# Patient Record
Sex: FEMALE | Race: WHITE | NOT HISPANIC OR LATINO | Employment: UNEMPLOYED | URBAN - NONMETROPOLITAN AREA
[De-identification: names, ages, dates, MRNs, and addresses within clinical notes are randomized per-mention and may not be internally consistent; named-entity substitution may affect disease eponyms.]

---
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
## 2021-04-14 ENCOUNTER — HOSPITAL ENCOUNTER (EMERGENCY)
Facility: HOSPITAL | Age: 21
Discharge: HOME | End: 2021-04-14
Attending: EMERGENCY MEDICINE | Admitting: EMERGENCY MEDICINE

## 2021-04-14 VITALS
DIASTOLIC BLOOD PRESSURE: 88 MMHG | HEART RATE: 91 BPM | TEMPERATURE: 97.7 F | SYSTOLIC BLOOD PRESSURE: 163 MMHG | RESPIRATION RATE: 20 BRPM | BODY MASS INDEX: 33.29 KG/M2 | OXYGEN SATURATION: 97 % | WEIGHT: 195 LBS | HEIGHT: 64 IN

## 2021-04-14 DIAGNOSIS — J03.90 TONSILLITIS: Primary | ICD-10-CM

## 2021-04-14 LAB
HETEROPH AB SER QL LA: NEGATIVE
S PYO AG THROAT QL: NEGATIVE

## 2021-04-14 PROCEDURE — 25010000002 DEXAMETHASONE SODIUM PHOSPHATE 10 MG/ML SOLUTION: Performed by: STUDENT IN AN ORGANIZED HEALTH CARE EDUCATION/TRAINING PROGRAM

## 2021-04-14 PROCEDURE — 86308 HETEROPHILE ANTIBODY SCREEN: CPT | Performed by: STUDENT IN AN ORGANIZED HEALTH CARE EDUCATION/TRAINING PROGRAM

## 2021-04-14 PROCEDURE — 99283 EMERGENCY DEPT VISIT LOW MDM: CPT

## 2021-04-14 PROCEDURE — 87081 CULTURE SCREEN ONLY: CPT | Performed by: EMERGENCY MEDICINE

## 2021-04-14 PROCEDURE — 87880 STREP A ASSAY W/OPTIC: CPT | Performed by: EMERGENCY MEDICINE

## 2021-04-14 PROCEDURE — 96372 THER/PROPH/DIAG INJ SC/IM: CPT

## 2021-04-14 RX ORDER — CLINDAMYCIN PHOSPHATE 150 MG/ML
600 INJECTION, SOLUTION INTRAVENOUS ONCE
Status: COMPLETED | OUTPATIENT
Start: 2021-04-14 | End: 2021-04-14

## 2021-04-14 RX ORDER — CLINDAMYCIN HYDROCHLORIDE 300 MG/1
300 CAPSULE ORAL 3 TIMES DAILY
Qty: 30 CAPSULE | Refills: 0 | Status: SHIPPED | OUTPATIENT
Start: 2021-04-14 | End: 2021-04-16

## 2021-04-14 RX ORDER — DEXAMETHASONE SODIUM PHOSPHATE 10 MG/ML
10 INJECTION, SOLUTION INTRAMUSCULAR; INTRAVENOUS ONCE
Status: COMPLETED | OUTPATIENT
Start: 2021-04-14 | End: 2021-04-14

## 2021-04-14 RX ADMIN — CLINDAMYCIN PHOSPHATE 600 MG: 150 INJECTION, SOLUTION INTRAVENOUS at 04:51

## 2021-04-14 RX ADMIN — DEXAMETHASONE SODIUM PHOSPHATE 10 MG: 10 INJECTION, SOLUTION INTRAMUSCULAR; INTRAVENOUS at 03:43

## 2021-04-14 NOTE — ED PROVIDER NOTES
Subjective   She presents with increasing tonsil symptoms over the past couple days.  Patient was seen at [facility] 3 days ago for sore throat and subjective fever.  Patient was swabbed for Covid, flu, and strep and tested negative.  They gave her an injection of penicillin and sent her home.  Patient states her tonsils have been swelling increasingly by the day and she has been coughing up white and yellow mucus.  She has not measured her temperature but she states she feels like she has been febrile during the course of this illness.          Review of Systems   Constitutional: Positive for fever. Negative for activity change and appetite change.   HENT: Positive for sore throat and trouble swallowing. Negative for congestion, ear pain and postnasal drip.    Eyes: Negative for pain and discharge.   Respiratory: Positive for cough. Negative for chest tightness and shortness of breath.    Cardiovascular: Negative for chest pain and palpitations.   Gastrointestinal: Positive for nausea. Negative for abdominal distention and abdominal pain.   Endocrine: Negative for cold intolerance and heat intolerance.   Genitourinary: Negative for difficulty urinating and dysuria.   Musculoskeletal: Negative for arthralgias and back pain.   Skin: Negative for color change and rash.   Allergic/Immunologic: Negative for environmental allergies and food allergies.   Neurological: Negative for dizziness and headaches.   Hematological: Negative for adenopathy. Does not bruise/bleed easily.   Psychiatric/Behavioral: Negative for agitation and confusion.       Past Medical History:   Diagnosis Date   • Allergic    • Anxiety    • Asthma    • Depression        No Known Allergies    Past Surgical History:   Procedure Laterality Date   • KNEE SURGERY Bilateral 2015    both knees        Family History   Problem Relation Age of Onset   • Heart disease Mother    • Diabetes Father    • Hypertension Father        Social History      Socioeconomic History   • Marital status: Single     Spouse name: Not on file   • Number of children: Not on file   • Years of education: Not on file   • Highest education level: Not on file   Tobacco Use   • Smoking status: Current Every Day Smoker     Packs/day: 0.25     Years: 2.00     Pack years: 0.50     Types: Cigarettes   • Smokeless tobacco: Never Used   Substance and Sexual Activity   • Alcohol use: No   • Drug use: No   • Sexual activity: Not Currently           Objective   Physical Exam  Constitutional:       Appearance: She is well-developed. She is ill-appearing.   HENT:      Head: Normocephalic and atraumatic.      Right Ear: Tympanic membrane normal.      Left Ear: Tympanic membrane normal.      Nose: No congestion.      Mouth/Throat:      Mouth: Mucous membranes are moist.      Pharynx: Uvula midline. No uvula swelling.      Tonsils: Tonsillar exudate present. 3+ on the right. 3+ on the left.   Eyes:      Pupils: Pupils are equal, round, and reactive to light.   Neck:      Thyroid: No thyromegaly.      Vascular: No JVD.      Trachea: No tracheal deviation.   Cardiovascular:      Rate and Rhythm: Normal rate and regular rhythm.      Pulses:           Radial pulses are 2+ on the right side and 2+ on the left side.        Dorsalis pedis pulses are 2+ on the right side and 2+ on the left side.      Heart sounds: Normal heart sounds, S1 normal and S2 normal.   Pulmonary:      Effort: Pulmonary effort is normal.      Breath sounds: Normal breath sounds.   Abdominal:      General: Bowel sounds are normal.   Musculoskeletal:         General: Normal range of motion.   Lymphadenopathy:      Cervical: Cervical adenopathy present.   Skin:     General: Skin is warm and dry.      Capillary Refill: Capillary refill takes 2 to 3 seconds.   Neurological:      Mental Status: She is alert and oriented to person, place, and time.      GCS: GCS eye subscore is 4. GCS verbal subscore is 5. GCS motor subscore is 6.    Psychiatric:         Speech: Speech normal.         Behavior: Behavior normal.         Thought Content: Thought content normal.         Procedures           ED Course      Lab Results (last 24 hours)     Procedure Component Value Units Date/Time    Mononucleosis Screen [[id number]]  (Normal) Collected: 04/14/21 0349    Specimen: Blood Updated: 04/14/21 0400     Monospot Negative    Rapid Strep A Screen - Swab, Throat [[id number]]  (Normal) Collected: 04/14/21 0310    Specimen: Swab from Throat Updated: 04/14/21 0341     Strep A Ag Negative    Beta Strep Culture, Throat - Swab, Throat [[id number]] Collected: 04/14/21 0310    Specimen: Swab from Throat Updated: 04/14/21 0341                                             MDM  Number of Diagnoses or Management Options  Tonsillitis: new and requires workup  Diagnosis management comments: Patient with significant bilateral enlarged tonsils worsening after a penicillin injection 3 days ago.  Negative for strep and mono today.  Clindamycin and Decadron given with follow-up to PCP and ENT.  Clinda prescription given.       Amount and/or Complexity of Data Reviewed  Clinical lab tests: reviewed and ordered    Risk of Complications, Morbidity, and/or Mortality  Presenting problems: minimal  Diagnostic procedures: minimal  Management options: minimal    Patient Progress  Patient progress: stable      Final diagnoses:   Tonsillitis       ED Disposition  ED Disposition     ED Disposition Condition Comment    Discharge Stable           Kirill Turk MD  [address]   [place] [id number] [phone]    Call in 1 day  for follow up    Ulises Smith MD  [address] [place] [address] DR HERBERT [address] [place] [id number] [phone]    Call in 1 day  If symptoms worsen         Medication List      New Prescriptions    clindamycin 300 MG capsule  Commonly known as: CLEOCIN  Take 1 capsule by mouth 3 (Three) Times a Day for 10 days.           Where to Get Your Medications      These  medications were sent to [facility], [facility] - [place], [place] - [address] - [phone]  - [phone] FX  [address], [facility] [address]    Phone: [phone]   · clindamycin 300 MG capsule       This document has been electronically signed by Belen Shaw MD on April 14, 2021 04:26 CDT    Belen Shaw MD PGY-2  Part of this note may be an electronic transcription/translation of spoken language to printed text using the Dragon Dictation System.        Belen Shaw MD  Resident  04/14/21 0426

## 2021-04-14 NOTE — DISCHARGE INSTRUCTIONS
Take antibiotics as prescribed.  Follow-up with your PCP in 1 day.  Call Dr. Smith's office, phone number on your discharge instructions, if you are not improving in 2 days.  Drink plenty of fluids.  Return to ED should you have worsening of symptoms.

## 2021-04-16 ENCOUNTER — OFFICE VISIT (OUTPATIENT)
Dept: FAMILY MEDICINE CLINIC | Facility: CLINIC | Age: 21
End: 2021-04-16

## 2021-04-16 VITALS — WEIGHT: 158 LBS | HEIGHT: 64 IN | BODY MASS INDEX: 26.98 KG/M2

## 2021-04-16 DIAGNOSIS — J02.9 SORE THROAT: Primary | ICD-10-CM

## 2021-04-16 LAB — BACTERIA SPEC AEROBE CULT: NORMAL

## 2021-04-16 PROCEDURE — 99442 PR PHYS/QHP TELEPHONE EVALUATION 11-20 MIN: CPT | Performed by: STUDENT IN AN ORGANIZED HEALTH CARE EDUCATION/TRAINING PROGRAM

## 2021-04-16 RX ORDER — AMOXICILLIN AND CLAVULANATE POTASSIUM 250; 125 MG/1; MG/1
1 TABLET, FILM COATED ORAL 3 TIMES DAILY
Qty: 21 TABLET | Refills: 0 | Status: SHIPPED | OUTPATIENT
Start: 2021-04-16 | End: 2021-04-23

## 2021-04-16 NOTE — PROGRESS NOTES
Family Medicine Residency  Velia Husain MD    Subjective:   You have chosen to receive care through a telephone visit. Do you consent to use a telephone visit for your medical care today? Yes    Breann Baltazar is a 21 y.o. female who presents for sore throat.     Sore throat  Patient states that since last Thursday she has had worsening throat pain with right ear pain, went to the ER in [facility] got a PCN shot and felt better for 3 days. Got worse and went to the [facility] ER on 4/14/2021, got antibiotics and got worse. Now she has worsening throat pain, was told she had tonsillitis, and left ear pain. Feels like she has fluid in her ears, runny nose, congestion, difficulty swallowing, but denies fever/chills. She would like some relief and will see Dr. Turk on Tuesday 4/20/2021.    The following portions of the patient's history were reviewed and updated as appropriate: allergies, current medications and problem list.    Past Medical Hx:  Past Medical History:   Diagnosis Date   • Allergic    • Anxiety    • Asthma    • Depression        Past Surgical Hx:  Past Surgical History:   Procedure Laterality Date   • KNEE SURGERY Bilateral 2015    both knees        Current Meds:    Current Outpatient Medications:   •  albuterol sulfate HFA (Ventolin HFA) 108 (90 Base) MCG/ACT inhaler, Inhale 2 puffs Every 4 (Four) Hours As Needed for Wheezing., Disp: 18 g, Rfl: 5  •  clindamycin (CLEOCIN) 300 MG capsule, Take 1 capsule by mouth 3 (Three) Times a Day for 10 days., Disp: 30 capsule, Rfl: 0  •  fluticasone (FLOVENT HFA) 110 MCG/ACT inhaler, Inhale 1 puff 2 (Two) Times a Day., Disp: 12 g, Rfl: 1    Allergies:  No Known Allergies    Family Hx:  Family History   Problem Relation Age of Onset   • Heart disease Mother    • Diabetes Father    • Hypertension Father         Social History:  Social History     Socioeconomic History   • Marital status: Single     Spouse name: Not on file   • Number of children: Not on file  "  • Years of education: Not on file   • Highest education level: Not on file   Tobacco Use   • Smoking status: Current Every Day Smoker     Packs/day: 0.25     Years: 2.00     Pack years: 0.50     Types: Cigarettes   • Smokeless tobacco: Never Used   Substance and Sexual Activity   • Alcohol use: No   • Drug use: No   • Sexual activity: Not Currently       Review of Systems  Review of Systems   Constitutional: Negative for chills and fever.   HENT: Positive for congestion, ear discharge ( Feels like water in the, draining to back of throat), ear pain (Left), rhinorrhea, sore throat and trouble swallowing. Negative for sinus pressure and sinus pain.    Eyes: Negative for photophobia and visual disturbance.   Respiratory: Positive for cough ( Mucus). Negative for shortness of breath.    Cardiovascular: Negative for chest pain and palpitations.   Gastrointestinal: Negative for abdominal pain and nausea.   Genitourinary: Negative for difficulty urinating and dyspareunia.   Musculoskeletal: Negative for arthralgias and back pain.   Neurological: Positive for headaches. Negative for light-headedness.   Psychiatric/Behavioral: Negative for dysphoric mood and sleep disturbance.   All other systems reviewed and are negative.      Objective:     Ht 162.6 cm (64\")   Wt 71.7 kg (158 lb)   BMI 27.12 kg/m²   Physical Exam  Constitutional:       Comments: Physical exam could not be performed due to this being a telephone/video visit encounter.  This telephone/video visit lasted 13 minutes.             Assessment/Plan:     Diagnoses and all orders for this visit:    1. Sore throat (Primary)  -     amoxicillin-clavulanate (AUGMENTIN) 250-125 MG per tablet; Take 1 tablet by mouth 3 (Three) Times a Day for 7 days.  Dispense: 21 tablet; Refill: 0      1. Augmentin 150-125mg 3 times daily for 7 days. Likely otitis media vs tonsillitis. Follow up with Dr. Turk at next visit. Also, had Zay Perez call in a Lidocaine viscous " solution for throat pain, 10 days worth.     2.  Patient understood and acknowledged education, counseling, medication/treatment benefits and side effects. Risks, complications, and expectations of outcomes were also addressed, discussed, and acknowledged. All questions were answered and addressed.     · Rx changes: As above  · Patient Education: As above  · Compliance at present is estimated to be fair.   · Efforts to improve compliance (if necessary) will be directed at Continued physician-patient relationship.    Depression screening: Recommend PHQ 9     Follow-up:     Return in about 4 days (around 4/20/2021) for Sore throat and ear pain.    FOLLOW UP: (Sore throat)    Preventative:  Health Maintenance   Topic Date Due   • ANNUAL PHYSICAL  Never done   • Pneumococcal Vaccine 0-64 (1 of 1 - PPSV23) 01/22/2007   • COVID-19 Vaccine (1) Never done   • CHLAMYDIA SCREENING  Never done   • INFLUENZA VACCINE  08/01/2021   • TDAP/TD VACCINES (2 - Td) 02/06/2023   • HEPATITIS C SCREENING  Completed   • MENINGOCOCCAL VACCINE  Completed   • HPV VACCINES  Completed     Female Preventative: Needs to be assessed by PCP  Recommended: none  Vaccine Counseling: N/A  Interest in Covid Vaccine: Not discussed, number to [facility] Covid Vaccine Administration provided to schedule. [phone] option 1.     Weight  -Class: Overweight: 25.0-29.9kg/m2   -Patient's Body mass index is 27.12 kg/m². BMI is above normal parameters. Recommendations include: referral to primary care.   eat more fruits and vegetables, decrease soda or juice intake, increase water intake, increase physical activity, reduce portion size, cut out extra servings, reduce fast food intake, plan meals, and have 3 meals a day    Alcohol use:  reports no history of alcohol use.  Nicotine status  reports that she has been smoking cigarettes. She has a 0.50 pack-year smoking history. She has never used smokeless tobacco.    Goals        Patient Stated    •  Less pain  (pt-stated)       Barriers: none        This was a telephone encounter that lasted 13 minutes.      RISK SCORE: 1      This document has been electronically signed by Velia Husain MD on April 16, 2021 11:47 CDT

## 2021-04-20 ENCOUNTER — OFFICE VISIT (OUTPATIENT)
Dept: FAMILY MEDICINE CLINIC | Facility: CLINIC | Age: 21
End: 2021-04-20

## 2021-04-20 VITALS
WEIGHT: 208 LBS | HEART RATE: 70 BPM | BODY MASS INDEX: 35.51 KG/M2 | DIASTOLIC BLOOD PRESSURE: 70 MMHG | SYSTOLIC BLOOD PRESSURE: 120 MMHG | OXYGEN SATURATION: 99 % | HEIGHT: 64 IN

## 2021-04-20 DIAGNOSIS — H65.91 MIDDLE EAR EFFUSION, RIGHT: Primary | ICD-10-CM

## 2021-04-20 DIAGNOSIS — J02.9 SORE THROAT: ICD-10-CM

## 2021-04-20 PROCEDURE — 99213 OFFICE O/P EST LOW 20 MIN: CPT | Performed by: STUDENT IN AN ORGANIZED HEALTH CARE EDUCATION/TRAINING PROGRAM

## 2021-04-20 RX ORDER — PREDNISONE 20 MG/1
20 TABLET ORAL 2 TIMES DAILY
Qty: 10 TABLET | Refills: 0 | Status: SHIPPED | OUTPATIENT
Start: 2021-04-20 | End: 2021-04-25

## 2021-04-20 NOTE — PROGRESS NOTES
Family Medicine Residency  Kirill Turk MD    Subjective:     Breann Baltazar is a 21 y.o. female who presents for follow up of sore throat. Has been doing well on Augmentin.  Says her sore throat resolved.  Complaining of right ear pain, fullness, decreased hearing that started on Sunday.  Patient was instructed to call ENT but had not done so.    The following portions of the patient's history were reviewed and updated as appropriate: allergies, current medications, past family history, past medical history, past social history, past surgical history and problem list.    Past Medical Hx:  Past Medical History:   Diagnosis Date   • Allergic    • Anxiety    • Asthma    • Depression        Past Surgical Hx:  Past Surgical History:   Procedure Laterality Date   • KNEE SURGERY Bilateral 2015    both knees        Current Meds:    Current Outpatient Medications:   •  albuterol sulfate HFA (Ventolin HFA) 108 (90 Base) MCG/ACT inhaler, Inhale 2 puffs Every 4 (Four) Hours As Needed for Wheezing., Disp: 18 g, Rfl: 5  •  amoxicillin-clavulanate (AUGMENTIN) 250-125 MG per tablet, Take 1 tablet by mouth 3 (Three) Times a Day for 7 days., Disp: 21 tablet, Rfl: 0  •  fluticasone (FLOVENT HFA) 110 MCG/ACT inhaler, Inhale 1 puff 2 (Two) Times a Day., Disp: 12 g, Rfl: 1  •  predniSONE (DELTASONE) 20 MG tablet, Take 1 tablet by mouth 2 (Two) Times a Day for 5 days., Disp: 10 tablet, Rfl: 0    Allergies:  No Known Allergies    Family Hx:  Family History   Problem Relation Age of Onset   • Heart disease Mother    • Diabetes Father    • Hypertension Father         Social History:  Social History     Socioeconomic History   • Marital status: Single     Spouse name: Not on file   • Number of children: Not on file   • Years of education: Not on file   • Highest education level: Not on file   Tobacco Use   • Smoking status: Current Every Day Smoker     Packs/day: 0.25     Years: 2.00     Pack years: 0.50     Types: Cigarettes   •  "Smokeless tobacco: Never Used   Substance and Sexual Activity   • Alcohol use: No   • Drug use: No   • Sexual activity: Not Currently       Review of Systems  Review of Systems   Constitutional: Negative for chills and fever.   HENT: Positive for ear pain. Negative for ear discharge, facial swelling and trouble swallowing.         Decreased right sided hearing   Eyes: Negative for photophobia and visual disturbance.   Respiratory: Negative for apnea, chest tightness and shortness of breath.    Cardiovascular: Negative for chest pain.   Gastrointestinal: Negative for abdominal distention, diarrhea, nausea and vomiting.   Genitourinary: Negative for pelvic pain.   Musculoskeletal: Negative for arthralgias and myalgias.   Neurological: Negative for seizures and speech difficulty.   Psychiatric/Behavioral: Negative for confusion and hallucinations.       Objective:     /70 (BP Location: Left arm, Patient Position: Sitting, Cuff Size: Adult)   Pulse 70   Ht 162.6 cm (64\")   Wt 94.3 kg (208 lb)   SpO2 99%   BMI 35.70 kg/m²   Physical Exam  Constitutional:       Appearance: She is well-developed.   HENT:      Head: Normocephalic and atraumatic.      Right Ear: Decreased hearing noted. A middle ear effusion is present. Tympanic membrane is bulging.      Left Ear: Tympanic membrane is bulging.      Nose: Nose normal.   Eyes:      Conjunctiva/sclera: Conjunctivae normal.      Pupils: Pupils are equal, round, and reactive to light.   Cardiovascular:      Rate and Rhythm: Normal rate and regular rhythm.      Pulses: Normal pulses.      Heart sounds: Normal heart sounds.   Pulmonary:      Effort: Pulmonary effort is normal. No respiratory distress.      Breath sounds: Normal breath sounds.   Abdominal:      General: Abdomen is flat. Bowel sounds are normal. There is no distension.      Palpations: Abdomen is soft.   Musculoskeletal:         General: Normal range of motion.      Cervical back: Normal range of motion " and neck supple.   Skin:     General: Skin is warm and dry.   Neurological:      General: No focal deficit present.      Mental Status: She is alert and oriented to person, place, and time. Mental status is at baseline.      Cranial Nerves: No cranial nerve deficit.   Psychiatric:         Mood and Affect: Mood normal.         Behavior: Behavior normal.          Assessment/Plan:     Diagnoses and all orders for this visit:    1. Middle ear effusion, right (Primary)  -     predniSONE (DELTASONE) 20 MG tablet; Take 1 tablet by mouth 2 (Two) Times a Day for 5 days.  Dispense: 10 tablet; Refill: 0  -     Ambulatory Referral to ENT (Otolaryngology)    -Patient has chronically had episodes of tonsillitis with sore throat.  Middle ear effusion likely secondary to extension of infection.  We will refer patient to ENT for further evaluation.  -Patient is happy to establish herself with myself as her PCP.     Follow-up:     Return in about 4 weeks (around 5/18/2021) for Recheck.    Preventative:  Health Maintenance   Topic Date Due   • ANNUAL PHYSICAL  Never done   • Pneumococcal Vaccine 0-64 (1 of 1 - PPSV23) 01/22/2007   • COVID-19 Vaccine (1) Never done   • CHLAMYDIA SCREENING  Never done   • INFLUENZA VACCINE  08/01/2021   • TDAP/TD VACCINES (2 - Td) 02/06/2023   • HEPATITIS C SCREENING  Completed   • MENINGOCOCCAL VACCINE  Completed   • HPV VACCINES  Completed       Weight  -Class: Obese Class II: 35-39.9kg/m2  -Patient's Body mass index is 35.7 kg/m². BMI is above normal parameters.   Alcohol use:  reports no history of alcohol use.  Nicotine status  reports that she has been smoking cigarettes. She has a 0.50 pack-year smoking history. She has never used smokeless tobacco.    Goals     •  Less pain (pt-stated)       Barriers: none            RISK SCORE: 3              This document has been electronically signed by Kirill Turk MD on April 20, 2021 15:44 CDT

## 2021-04-21 NOTE — PROGRESS NOTES
I have seen the patient.  I have reviewed the notes, assessments, and/or procedures performed by *Dr Husain** during office visit I concur with her/his documentation and assessment and plan for Breann Baltazar.              This document has been electronically signed by Erasmo Sellers MD on April 21, 2021 10:03 CDT

## 2021-04-22 NOTE — PROGRESS NOTES
I have seen the patient.  I have reviewed the notes, assessments, and/or procedures performed by Kirill Turk MD, I concur with her/his documentation and assessment and plan for Breann Brittney Giovanny.               This document has been electronically signed by Gray Urrutia MD on April 22, 2021 11:14 CDT

## 2021-08-11 DIAGNOSIS — J45.40 MODERATE PERSISTENT ASTHMA WITHOUT COMPLICATION: ICD-10-CM

## 2021-08-11 RX ORDER — ALBUTEROL SULFATE 90 UG/1
2 AEROSOL, METERED RESPIRATORY (INHALATION) EVERY 4 HOURS PRN
Qty: 18 G | Refills: 5 | Status: SHIPPED | OUTPATIENT
Start: 2021-08-11 | End: 2021-12-14 | Stop reason: SDUPTHER

## 2021-08-11 NOTE — TELEPHONE ENCOUNTER
TRIED TO CALL PATIENT TO MAKE OVERDUE F/U; FIRST ATTEMPT; UNABLE TO REACH X1.        THANK YOU,      SILVIO

## 2021-08-11 NOTE — TELEPHONE ENCOUNTER
Incoming Refill Request      Medication requested (name and dose): albuterol sulfate HFA (Ventolin HFA) 108 (90 Base) MCG/ACT inhaler    [facility] [address] [phone]  - [phone] FX  Additional details provided by patient: N/A    Best call back number: [phone]  Does the patient have less than a 3 day supply:  [x] Yes  [] No    Nolvia Carmichael  08/11/21, 10:36 CDT

## 2021-09-27 ENCOUNTER — INITIAL PRENATAL (OUTPATIENT)
Dept: OBSTETRICS AND GYNECOLOGY | Facility: CLINIC | Age: 21
End: 2021-09-27

## 2021-09-27 ENCOUNTER — LAB (OUTPATIENT)
Dept: LAB | Facility: HOSPITAL | Age: 21
End: 2021-09-27

## 2021-09-27 VITALS — SYSTOLIC BLOOD PRESSURE: 101 MMHG | BODY MASS INDEX: 35.09 KG/M2 | WEIGHT: 204.4 LBS | DIASTOLIC BLOOD PRESSURE: 60 MMHG

## 2021-09-27 DIAGNOSIS — Z34.00 SUPERVISION OF NORMAL FIRST PREGNANCY, ANTEPARTUM: ICD-10-CM

## 2021-09-27 DIAGNOSIS — Z32.00 PREGNANCY EXAMINATION OR TEST, PREGNANCY UNCONFIRMED: Primary | ICD-10-CM

## 2021-09-27 DIAGNOSIS — Z32.01 POSITIVE PREGNANCY TEST: ICD-10-CM

## 2021-09-27 DIAGNOSIS — Z34.00 SUPERVISION OF NORMAL FIRST PREGNANCY, ANTEPARTUM: Primary | ICD-10-CM

## 2021-09-27 DIAGNOSIS — Z78.9 DATE OF LAST MENSTRUAL PERIOD (LMP) UNKNOWN: ICD-10-CM

## 2021-09-27 DIAGNOSIS — O36.80X0 ENCOUNTER TO DETERMINE FETAL VIABILITY OF PREGNANCY, SINGLE OR UNSPECIFIED FETUS: ICD-10-CM

## 2021-09-27 DIAGNOSIS — E66.9 OBESITY (BMI 30-39.9): ICD-10-CM

## 2021-09-27 DIAGNOSIS — F17.210 CIGARETTE SMOKER: ICD-10-CM

## 2021-09-27 LAB
ABO GROUP BLD: NORMAL
AMPHET+METHAMPHET UR QL: NEGATIVE
AMPHETAMINES UR QL: NEGATIVE
B-HCG UR QL: POSITIVE
BARBITURATES UR QL SCN: NEGATIVE
BASOPHILS # BLD AUTO: 0.04 10*3/MM3 (ref 0–0.2)
BASOPHILS NFR BLD AUTO: 0.4 % (ref 0–1.5)
BENZODIAZ UR QL SCN: NEGATIVE
BILIRUB UR QL STRIP: NEGATIVE
BLD GP AB SCN SERPL QL: NEGATIVE
BUPRENORPHINE SERPL-MCNC: NEGATIVE NG/ML
CANNABINOIDS SERPL QL: POSITIVE
CLARITY UR: ABNORMAL
COCAINE UR QL: NEGATIVE
COLOR UR: YELLOW
DEPRECATED RDW RBC AUTO: 41.6 FL (ref 37–54)
EOSINOPHIL # BLD AUTO: 0.01 10*3/MM3 (ref 0–0.4)
EOSINOPHIL NFR BLD AUTO: 0.1 % (ref 0.3–6.2)
ERYTHROCYTE [DISTWIDTH] IN BLOOD BY AUTOMATED COUNT: 13.3 % (ref 12.3–15.4)
GLUCOSE UR STRIP-MCNC: NEGATIVE MG/DL
HBV SURFACE AG SERPL QL IA: NORMAL
HCG INTACT+B SERPL-ACNC: NORMAL MIU/ML
HCT VFR BLD AUTO: 38.6 % (ref 34–46.6)
HCV AB SER DONR QL: NORMAL
HGB BLD-MCNC: 13.1 G/DL (ref 12–15.9)
HGB UR QL STRIP.AUTO: ABNORMAL
HIV1+2 AB SER QL: NORMAL
HOLD SPECIMEN: NORMAL
IMM GRANULOCYTES # BLD AUTO: 0.04 10*3/MM3 (ref 0–0.05)
IMM GRANULOCYTES NFR BLD AUTO: 0.4 % (ref 0–0.5)
INTERNAL NEGATIVE CONTROL: NEGATIVE
INTERNAL POSITIVE CONTROL: POSITIVE
KETONES UR QL STRIP: NEGATIVE
LEUKOCYTE ESTERASE UR QL STRIP.AUTO: NEGATIVE
LYMPHOCYTES # BLD AUTO: 3.39 10*3/MM3 (ref 0.7–3.1)
LYMPHOCYTES NFR BLD AUTO: 33.6 % (ref 19.6–45.3)
Lab: ABNORMAL
Lab: NORMAL
MCH RBC QN AUTO: 29.3 PG (ref 26.6–33)
MCHC RBC AUTO-ENTMCNC: 33.9 G/DL (ref 31.5–35.7)
MCV RBC AUTO: 86.4 FL (ref 79–97)
METHADONE UR QL SCN: NEGATIVE
MONOCYTES # BLD AUTO: 0.44 10*3/MM3 (ref 0.1–0.9)
MONOCYTES NFR BLD AUTO: 4.4 % (ref 5–12)
NEUTROPHILS NFR BLD AUTO: 6.17 10*3/MM3 (ref 1.7–7)
NEUTROPHILS NFR BLD AUTO: 61.1 % (ref 42.7–76)
NITRITE UR QL STRIP: NEGATIVE
NRBC BLD AUTO-RTO: 0 /100 WBC (ref 0–0.2)
OPIATES UR QL: NEGATIVE
OXYCODONE UR QL SCN: NEGATIVE
PCP UR QL SCN: NEGATIVE
PH UR STRIP.AUTO: 6 [PH] (ref 5–8)
PLATELET # BLD AUTO: 299 10*3/MM3 (ref 140–450)
PMV BLD AUTO: 11.6 FL (ref 6–12)
PROPOXYPH UR QL: NEGATIVE
PROT UR QL STRIP: NEGATIVE
RBC # BLD AUTO: 4.47 10*6/MM3 (ref 3.77–5.28)
RH BLD: POSITIVE
SP GR UR STRIP: 1.01 (ref 1–1.03)
TRICYCLICS UR QL SCN: NEGATIVE
UROBILINOGEN UR QL STRIP: ABNORMAL
WBC # BLD AUTO: 10.09 10*3/MM3 (ref 3.4–10.8)

## 2021-09-27 PROCEDURE — 86901 BLOOD TYPING SEROLOGIC RH(D): CPT | Performed by: NURSE PRACTITIONER

## 2021-09-27 PROCEDURE — G0480 DRUG TEST DEF 1-7 CLASSES: HCPCS

## 2021-09-27 PROCEDURE — 86900 BLOOD TYPING SEROLOGIC ABO: CPT | Performed by: NURSE PRACTITIONER

## 2021-09-27 PROCEDURE — 80081 OBSTETRIC PANEL INC HIV TSTG: CPT | Performed by: NURSE PRACTITIONER

## 2021-09-27 PROCEDURE — 81025 URINE PREGNANCY TEST: CPT | Performed by: NURSE PRACTITIONER

## 2021-09-27 PROCEDURE — 36415 COLL VENOUS BLD VENIPUNCTURE: CPT

## 2021-09-27 PROCEDURE — 87661 TRICHOMONAS VAGINALIS AMPLIF: CPT | Performed by: NURSE PRACTITIONER

## 2021-09-27 PROCEDURE — 80306 DRUG TEST PRSMV INSTRMNT: CPT | Performed by: NURSE PRACTITIONER

## 2021-09-27 PROCEDURE — 86803 HEPATITIS C AB TEST: CPT | Performed by: NURSE PRACTITIONER

## 2021-09-27 PROCEDURE — 84702 CHORIONIC GONADOTROPIN TEST: CPT | Performed by: NURSE PRACTITIONER

## 2021-09-27 PROCEDURE — 86850 RBC ANTIBODY SCREEN: CPT | Performed by: NURSE PRACTITIONER

## 2021-09-27 PROCEDURE — 87086 URINE CULTURE/COLONY COUNT: CPT | Performed by: NURSE PRACTITIONER

## 2021-09-27 PROCEDURE — 81003 URINALYSIS AUTO W/O SCOPE: CPT | Performed by: NURSE PRACTITIONER

## 2021-09-27 PROCEDURE — 87491 CHLMYD TRACH DNA AMP PROBE: CPT | Performed by: NURSE PRACTITIONER

## 2021-09-27 PROCEDURE — 87591 N.GONORRHOEAE DNA AMP PROB: CPT | Performed by: NURSE PRACTITIONER

## 2021-09-27 RX ORDER — PRENATAL VIT NO.126/IRON/FOLIC 28MG-0.8MG
TABLET ORAL DAILY
COMMUNITY

## 2021-09-27 RX ORDER — ONDANSETRON 8 MG/1
8 TABLET, ORALLY DISINTEGRATING ORAL EVERY 8 HOURS PRN
Qty: 20 TABLET | Refills: 1 | Status: SHIPPED | OUTPATIENT
Start: 2021-09-27 | End: 2021-10-14

## 2021-09-27 NOTE — PROGRESS NOTES
I spent approximately 60 minutes with the patient acquiring the health and history intake and discussing topics related to healthy lifestyle. She is accompanied by her boyfriend.  Her LMP is unknown; she believes it was sometime in August. This is her 1st pregnancy.   The patient has Multiple Hereditary Exostoses. Her mother and sister also has this genetic bone disorder. The patient has an inhaler for asthma. She also has anxiety and depression. She is not on medicine. She says she is coping well right now. She filled out the depression screening questionnaire and scored 5. The patient says she now smokes 1/2 pack cigarettes daily. She denies any drug use. She drank alcohol prior to getting pregnant.    The patient's nephew has autism. The FOB's mother has a bleeding disorder.   A newob bag is given. The 1st trimester teaching was done with the patient. We discussed a healthy diet and exercise and what is recommended. She is taking a prenatal vitamin. We  also discussed Listeriosis and Toxoplasmosis and what fish to avoid due to high mercury levels. I informed patient not to be in hot tubs, saunas, or tanning beds. We discussed that spotting may occur after intercourse which is common, but if heavy bleeding like a period occurs to [facility] if clinic is closed.  I encouraged her to make an appointment with the dentist if she has not had a dental exam and cleaning in the last 6 months. She is considering breastfeeding.  I gave her pamphlet on breastfeeding classes and the breastfeeding mothers support group. These services are provided by Verna Craft, Lactation Consultant. She filled out the health department referral form. I encouraged the patient to get the TDAP vaccine in the 3rd trimester.  I discussed with the patient that a pediatrician needs to be chosen prior to delivery for the infant to have an appointment scheduled before leaving the hospital.  I discussed lab tests will be done  today. An early 1hr glucola will be done during the 1st trimester for BMI 35. All questions were answered at this time. She is scheduled for an ultrasound and to see Magaly MARTINEZ on 10/14/21.

## 2021-09-28 LAB
BACTERIA SPEC AEROBE CULT: NORMAL
C TRACH RRNA CVX QL NAA+PROBE: NEGATIVE
N GONORRHOEA RRNA SPEC QL NAA+PROBE: NEGATIVE
RPR SER QL: NORMAL
RUBV IGG SERPL IA-ACNC: 1.92 INDEX
TRICHOMONAS VAGINALIS PCR: NEGATIVE

## 2021-10-08 LAB — REF LAB TEST RESULTS: NORMAL

## 2021-10-14 ENCOUNTER — INITIAL PRENATAL (OUTPATIENT)
Dept: OBSTETRICS AND GYNECOLOGY | Facility: CLINIC | Age: 21
End: 2021-10-14

## 2021-10-14 VITALS — DIASTOLIC BLOOD PRESSURE: 60 MMHG | WEIGHT: 204 LBS | BODY MASS INDEX: 35.02 KG/M2 | SYSTOLIC BLOOD PRESSURE: 112 MMHG

## 2021-10-14 DIAGNOSIS — O99.341 DEPRESSION DURING PREGNANCY IN FIRST TRIMESTER: ICD-10-CM

## 2021-10-14 DIAGNOSIS — O09.91 HIGH-RISK PREGNANCY IN FIRST TRIMESTER: ICD-10-CM

## 2021-10-14 DIAGNOSIS — F41.9 ANXIETY DURING PREGNANCY IN FIRST TRIMESTER, ANTEPARTUM: Primary | ICD-10-CM

## 2021-10-14 DIAGNOSIS — F32.A DEPRESSION DURING PREGNANCY IN FIRST TRIMESTER: ICD-10-CM

## 2021-10-14 DIAGNOSIS — O99.211 OBESITY AFFECTING PREGNANCY IN FIRST TRIMESTER: ICD-10-CM

## 2021-10-14 DIAGNOSIS — O99.321 DRUG USE AFFECTING PREGNANCY IN FIRST TRIMESTER: ICD-10-CM

## 2021-10-14 DIAGNOSIS — F12.10 MARIJUANA ABUSE: ICD-10-CM

## 2021-10-14 DIAGNOSIS — Z34.01 PRIMIGRAVIDA IN FIRST TRIMESTER: ICD-10-CM

## 2021-10-14 DIAGNOSIS — O99.331 MATERNAL TOBACCO USE IN FIRST TRIMESTER: ICD-10-CM

## 2021-10-14 DIAGNOSIS — Z3A.08 8 WEEKS GESTATION OF PREGNANCY: ICD-10-CM

## 2021-10-14 DIAGNOSIS — O99.341 ANXIETY DURING PREGNANCY IN FIRST TRIMESTER, ANTEPARTUM: Primary | ICD-10-CM

## 2021-10-14 PROBLEM — J45.20 MILD INTERMITTENT ASTHMA: Status: ACTIVE | Noted: 2021-10-14

## 2021-10-14 PROBLEM — M89.9 DISORDER OF BONE: Status: ACTIVE | Noted: 2021-10-14

## 2021-10-14 PROCEDURE — 99203 OFFICE O/P NEW LOW 30 MIN: CPT | Performed by: NURSE PRACTITIONER

## 2021-10-14 RX ORDER — METOCLOPRAMIDE 10 MG/1
10 TABLET ORAL 4 TIMES DAILY
Qty: 120 TABLET | Refills: 3 | Status: SHIPPED | OUTPATIENT
Start: 2021-10-14

## 2021-10-14 NOTE — PROGRESS NOTES
[facility]  Obstetrics Visit    CHIEF COMPLAINT:  New prenatal visit    HISTORY OF PRESENT ILLNESS:  Breann Baltazar is a 21 y.o. y/o  at 8w4d by LMP (No LMP recorded (lmp unknown). Patient is pregnant.).  This was an unplanned pregnancy and the patient is supported by her boyfriend.  Reports  nausea with vomiting.  Reports breast tenderness.  She denies any vaginal bleeding.  She has started taking a prenatal vitamin.    REVIEW OF SYSTEMS  Review of Systems   Constitutional: Negative for activity change, appetite change, diaphoresis, fatigue, unexpected weight gain and unexpected weight loss.   Respiratory: Negative for chest tightness and shortness of breath.    Cardiovascular: Negative for chest pain and palpitations.   Gastrointestinal: Positive for nausea and vomiting. Negative for abdominal distention, abdominal pain, constipation and diarrhea.   Endocrine: Negative.    Genitourinary: Positive for frequency. Negative for amenorrhea, breast discharge, breast lump, breast pain, decreased libido, decreased urine volume, difficulty urinating, dyspareunia, dysuria, flank pain, hematuria, menstrual problem, pelvic pain, pelvic pressure, urgency, vaginal bleeding, vaginal discharge and vaginal pain.   Musculoskeletal: Negative for myalgias.   Skin: Negative for color change, dry skin and skin lesions.   Neurological: Negative for light-headedness and headache.   Psychiatric/Behavioral: Negative for agitation, dysphoric mood, sleep disturbance, depressed mood and stress. The patient is not nervous/anxious.         EDPS- 5       PRENATAL RISK FACTORS   Problems (from 22 to present)     Problem Noted Resolved    High-risk pregnancy in first trimester 10/14/2021 by Magaly Ferrer, APRN No    Maternal tobacco use in first trimester 10/14/2021 by Magaly Ferrer, APRN No    Overview Signed 10/14/2021 10:59 AM by Magaly Ferrer APRN     1/2 ppd down to 5 cig/day at time of NOB visit          Marijuana abuse 10/14/2021 by Magaly Ferrer APRN No    Drug use affecting pregnancy in first trimester 10/14/2021 by Magaly Ferrer APRN No    Primigravida in first trimester 10/14/2021 by Magaly Ferrer APRN No    Obesity affecting pregnancy in first trimester 10/14/2021 by Magaly Ferrer APRN No    Overview Signed 10/14/2021 10:59 AM by Magaly Ferrer APRN     Needs 1T glucola         Depression during pregnancy in first trimester 10/14/2021 by Magaly Ferrer APRN No    Overview Signed 10/14/2021 10:59 AM by Magaly Ferrer APRN     10/14: EDPS- 5         Anxiety during pregnancy in first trimester, antepartum 10/14/2021 by Magaly Ferrer APRN No    Overview Signed 10/14/2021 10:59 AM by Magaly Ferrer APRN     10/14: EDPS- 5               DATING CRITERIA:  LMP (unk) -- CARRILLO ?  1TUS (10/14/21 at 8w4d) -- CARRILLO 23    OBSTETRIC HISTORY:  OB History    Para Term  AB Living   1             SAB IAB Ectopic Molar Multiple Live Births                    # Outcome Date GA Lbr Fredis/2nd Weight Sex Delivery Anes PTL Lv   1 Current              GYN HISTORY:  Denies h/o sexually transmitted infections/pelvic inflammatory disease  Denies h/o abnormal pap smears  Last pap smear:   Last Completed Pap Smear     This patient has no relevant Health Maintenance data.        Denies h/o gynecologic surgeries, including biopsies of the cervix    PAST MEDICAL HISTORY:  Past Medical History:   Diagnosis Date   • Allergic    • Anxiety    • Asthma    • Depression    • Multiple hereditary exostoses    • Smoker      PAST SURGICAL HISTORY:  Past Surgical History:   Procedure Laterality Date   • FEMUR OSTEOCHONDROMA Right    • KNEE SURGERY Bilateral 2015    both knees    • WISDOM TOOTH EXTRACTION       FAMILY HISTORY:  Family History   Problem Relation Age of Onset   • COPD Mother    • Asthma Mother    • Diabetes Father    • Hypertension Father    • Cirrhosis Father    • Heart attack Paternal Grandfather      SOCIAL  HISTORY:  Social History     Socioeconomic History   • Marital status: Single   Tobacco Use   • Smoking status: Current Every Day Smoker     Packs/day: 0.50     Years: 2.00     Pack years: 1.00     Types: Cigarettes   • Smokeless tobacco: Never Used   Substance and Sexual Activity   • Alcohol use: Not Currently   • Drug use: No   • Sexual activity: Yes     Partners: Male     GENETIC SCREENING:  Age >34 yo as of CARRILLO: no  Thalassemia: no  NTD: no  CHD: no  Down Syndrome/MR/Fragile X/Autism: no  Ashkenazi Restorationist with Selvin-Sachs, [place], familial dysautonomia: no  Sickle cell disease or trait: no  Hemophilia: no  Muscular dystrophy: no  Cystic fibrosis: no  Ary's chorea: no  Birth defects: no  Genetic/chromosomal disorders: bone disorder    INFECTION HISTORY:  TB exposure: no  HSV: no  Illness since LMP: no  Prior GBS infected child: no  STIs: no    ALLERGIES:  No Known Allergies    MEDICATIONS:  Prior to Admission medications    Medication Sig Start Date End Date Taking? Authorizing Provider   albuterol sulfate HFA (Ventolin HFA) 108 (90 Base) MCG/ACT inhaler Inhale 2 puffs Every 4 (Four) Hours As Needed for Wheezing. 8/11/21   Gray Urrutia MD   metoclopramide (Reglan) 10 MG tablet Take 1 tablet by mouth 4 (Four) Times a Day. 10/14/21   Magaly Ferrer APRN   prenatal vitamin (prenatal, CLASSIC, vitamin) tablet Take  by mouth Daily.    Provider, MD Vu   ondansetron ODT (Zofran ODT) 8 MG disintegrating tablet Place 1 tablet on the tongue Every 8 (Eight) Hours As Needed for Nausea or Vomiting. 9/27/21 10/14/21  Regina Moreira APRN       PHYSICAL EXAM:   /60   Wt 92.5 kg (204 lb)   LMP  (LMP Unknown)   BMI 35.02 kg/m²   General: Alert, healthy, no distress, well nourished and well developed.  Neurologic: Alert, oriented to person, place, and time.  Gait normal.  Cranial nerves II-XII grossly intact.  HEENT: Normocephalic, atraumatic.  Extraocular muscles intact, pupils equal  and reactive x2.    Teeth: Normal hygiene.  Neck: Supple, no adenopathy, thyroid normal size, non-tender, without nodularity, trachea midline.  Breasts: No masses, skin dimpling, skin retraction, nipple discharge, or asymmetry bilaterally.  Lungs: Normal respiratory effort.  Clear to auscultation bilaterally.  No wheezes, rhonci, or rales.  Heart: Regular rate and rhythm.  No murmer, rub or gallop.  Abdomen: Soft, non-tender, non-distended,no masses, no hepatosplenomegaly, no hernia.  Skin: No rash, no lesions.  Extremities: No cyanosis, clubbing or edema.  PELVIC EXAM:  Deferred    IMPRESSION:  Breann Baltazar is a 21 y.o.  at 8w4d for a new prenatal visit.    PLAN:  1.  IUP at 8w4d  - Options counseling performed and patient desires continuation of pregnancy to term   - Prenatal labs ordered  - Genetic testing, including cystic fibrosis, was discussed and patient considering  - Continue prenatal vitamins  - Weight gain counseling performed.   - Pregravid BMI >30: Recommend 11-20 lb  - Return to clinic in 4 weeks for return prenatal visit  - Reviewed COVID-19 visitation policy  - Reviewed COVID-19 precautions     Diagnosis Plan   1. Anxiety during pregnancy in first trimester, antepartum  No meds at this time.    2. Depression during pregnancy in first trimester     3. Obesity affecting pregnancy in first trimester  Glucola next visit   4. Primigravida in first trimester     5. Drug use affecting pregnancy in first trimester     6. Marijuana abuse  Encouraged complete cessation at this time   7. Maternal tobacco use in first trimester  For THC and cigarettes   8. High-risk pregnancy in first trimester     9. 8 weeks gestation of pregnancy       Magaly Ferrer, JUAN  10/14/2021  11:01 CDT

## 2021-10-18 ENCOUNTER — TELEPHONE (OUTPATIENT)
Dept: OBSTETRICS AND GYNECOLOGY | Facility: CLINIC | Age: 21
End: 2021-10-18

## 2021-10-18 DIAGNOSIS — R30.0 DYSURIA: ICD-10-CM

## 2021-10-18 DIAGNOSIS — N89.8 VAGINAL DISCHARGE: Primary | ICD-10-CM

## 2021-10-18 RX ORDER — ONDANSETRON 8 MG/1
8 TABLET, ORALLY DISINTEGRATING ORAL EVERY 8 HOURS PRN
Qty: 30 TABLET | Refills: 2 | Status: SHIPPED | OUTPATIENT
Start: 2021-10-18 | End: 2021-12-14 | Stop reason: SDUPTHER

## 2021-10-18 NOTE — TELEPHONE ENCOUNTER
Patient called wanting labs done for possible yeast infection/kidney infection and also wanting zofran. [place] [facility].  Thanks

## 2021-10-19 ENCOUNTER — LAB (OUTPATIENT)
Dept: LAB | Facility: HOSPITAL | Age: 21
End: 2021-10-19

## 2021-10-19 DIAGNOSIS — N89.8 VAGINAL DISCHARGE: ICD-10-CM

## 2021-10-19 DIAGNOSIS — R30.0 DYSURIA: ICD-10-CM

## 2021-10-19 LAB
CANDIDA ALBICANS: NEGATIVE
GARDNERELLA VAGINALIS: POSITIVE
T VAGINALIS DNA VAG QL PROBE+SIG AMP: NEGATIVE

## 2021-10-19 PROCEDURE — 87660 TRICHOMONAS VAGIN DIR PROBE: CPT

## 2021-10-19 PROCEDURE — 87480 CANDIDA DNA DIR PROBE: CPT

## 2021-10-19 PROCEDURE — 81001 URINALYSIS AUTO W/SCOPE: CPT

## 2021-10-19 PROCEDURE — 87510 GARDNER VAG DNA DIR PROBE: CPT

## 2021-10-19 PROCEDURE — 87086 URINE CULTURE/COLONY COUNT: CPT

## 2021-10-20 LAB
BACTERIA SPEC AEROBE CULT: NORMAL
BACTERIA UR QL AUTO: NORMAL /HPF
BILIRUB UR QL STRIP: NEGATIVE
CLARITY UR: CLEAR
COLOR UR: ABNORMAL
GLUCOSE UR STRIP-MCNC: NEGATIVE MG/DL
HGB UR QL STRIP.AUTO: NEGATIVE
HYALINE CASTS UR QL AUTO: NORMAL /LPF
KETONES UR QL STRIP: ABNORMAL
LEUKOCYTE ESTERASE UR QL STRIP.AUTO: NEGATIVE
NITRITE UR QL STRIP: NEGATIVE
PH UR STRIP.AUTO: 6 [PH] (ref 5–8)
PROT UR QL STRIP: ABNORMAL
RBC # UR: NORMAL /HPF
REF LAB TEST METHOD: NORMAL
SP GR UR STRIP: 1.02 (ref 1–1.03)
SQUAMOUS #/AREA URNS HPF: NORMAL /HPF
UROBILINOGEN UR QL STRIP: ABNORMAL
WBC UR QL AUTO: NORMAL /HPF

## 2021-10-21 ENCOUNTER — TELEPHONE (OUTPATIENT)
Dept: OBSTETRICS AND GYNECOLOGY | Facility: CLINIC | Age: 21
End: 2021-10-21

## 2021-10-21 RX ORDER — METRONIDAZOLE 500 MG/1
500 TABLET ORAL 2 TIMES DAILY
Qty: 14 TABLET | Refills: 0 | Status: SHIPPED | OUTPATIENT
Start: 2021-10-21 | End: 2021-10-28

## 2021-10-21 NOTE — TELEPHONE ENCOUNTER
----- Message from JUAN Serrano sent at 10/20/2021  9:26 AM CDT -----  + BV if she's having spotting or cramping please send flagyl. Otherwise, encourage her to try to wait until closer to 14 weeks for treatment.

## 2021-11-11 ENCOUNTER — ROUTINE PRENATAL (OUTPATIENT)
Dept: OBSTETRICS AND GYNECOLOGY | Facility: CLINIC | Age: 21
End: 2021-11-11

## 2021-11-11 ENCOUNTER — LAB (OUTPATIENT)
Dept: LAB | Facility: HOSPITAL | Age: 21
End: 2021-11-11

## 2021-11-11 VITALS — WEIGHT: 202 LBS | SYSTOLIC BLOOD PRESSURE: 112 MMHG | DIASTOLIC BLOOD PRESSURE: 64 MMHG | BODY MASS INDEX: 34.67 KG/M2

## 2021-11-11 DIAGNOSIS — F32.A DEPRESSION DURING PREGNANCY IN FIRST TRIMESTER: ICD-10-CM

## 2021-11-11 DIAGNOSIS — O99.341 DEPRESSION DURING PREGNANCY IN FIRST TRIMESTER: ICD-10-CM

## 2021-11-11 DIAGNOSIS — Z3A.12 12 WEEKS GESTATION OF PREGNANCY: Primary | ICD-10-CM

## 2021-11-11 DIAGNOSIS — F12.10 MARIJUANA ABUSE: ICD-10-CM

## 2021-11-11 DIAGNOSIS — O99.331 MATERNAL TOBACCO USE IN FIRST TRIMESTER: ICD-10-CM

## 2021-11-11 DIAGNOSIS — O99.211 OBESITY AFFECTING PREGNANCY IN FIRST TRIMESTER: ICD-10-CM

## 2021-11-11 DIAGNOSIS — O99.341 ANXIETY DURING PREGNANCY IN FIRST TRIMESTER, ANTEPARTUM: ICD-10-CM

## 2021-11-11 DIAGNOSIS — O99.321 DRUG USE AFFECTING PREGNANCY IN FIRST TRIMESTER: ICD-10-CM

## 2021-11-11 DIAGNOSIS — O09.91 HIGH-RISK PREGNANCY IN FIRST TRIMESTER: ICD-10-CM

## 2021-11-11 DIAGNOSIS — Z34.00 SUPERVISION OF NORMAL FIRST PREGNANCY, ANTEPARTUM: ICD-10-CM

## 2021-11-11 DIAGNOSIS — E66.9 OBESITY (BMI 30-39.9): ICD-10-CM

## 2021-11-11 DIAGNOSIS — F41.9 ANXIETY DURING PREGNANCY IN FIRST TRIMESTER, ANTEPARTUM: ICD-10-CM

## 2021-11-11 LAB — GLUCOSE 1H P 100 G GLC PO SERPL-MCNC: 141 MG/DL (ref 65–139)

## 2021-11-11 PROCEDURE — 36415 COLL VENOUS BLD VENIPUNCTURE: CPT

## 2021-11-11 PROCEDURE — 99212 OFFICE O/P EST SF 10 MIN: CPT | Performed by: NURSE PRACTITIONER

## 2021-11-11 PROCEDURE — 82950 GLUCOSE TEST: CPT

## 2021-11-12 PROBLEM — Z34.01 PRIMIGRAVIDA IN FIRST TRIMESTER: Status: RESOLVED | Noted: 2021-10-14 | Resolved: 2021-11-12

## 2021-11-12 NOTE — PROGRESS NOTES
CC: Prenatal visit    Breann Baltazar is a 21 y.o.  at 12w4d.  Doing well.  No complaints.  Denies contractions, LOF, or VB.      /64   Wt 91.6 kg (202 lb)   LMP  (LMP Unknown)   BMI 34.67 kg/m²        Fetal Heart Rate: 150     Problems (from 21 to present)     Problem Noted Resolved    High-risk pregnancy in first trimester 10/14/2021 by Magaly Ferrer APRN No    Maternal tobacco use in first trimester 10/14/2021 by Magaly Ferrer APRN No    Overview Signed 10/14/2021 10:59 AM by Magaly Ferrer APRN     1/2 ppd down to 5 cig/day at time of NOB visit         Marijuana abuse 10/14/2021 by Magaly Ferrer APRN No    Drug use affecting pregnancy in first trimester 10/14/2021 by Magaly Ferrer APRN No    Obesity affecting pregnancy in first trimester 10/14/2021 by Magaly Ferrer APRN No    Overview Signed 10/14/2021 10:59 AM by Magaly Ferrer APRN     Needs 1T glucola         Depression during pregnancy in first trimester 10/14/2021 by Magaly Ferrer APRN No    Overview Signed 10/14/2021 10:59 AM by Magaly Ferrer APRN     10/14: EDPS- 5         Anxiety during pregnancy in first trimester, antepartum 10/14/2021 by Magaly Ferrer APRN No    Overview Signed 10/14/2021 10:59 AM by Magaly Ferrer APRN     10/14: EDPS- 5         Primigravida in first trimester 10/14/2021 by Magaly Ferrer APRN 2021 by Regina Moreira APRN          A/P: Breann Baltazar is a 21 y.o.  at 12w5d.  - RTC in 4 weeks     Diagnosis Plan   1. 12 weeks gestation of pregnancy     2. High-risk pregnancy in first trimester     3. Maternal tobacco use in first trimester     4. Marijuana abuse     5. Anxiety during pregnancy in first trimester, antepartum     6. Depression during pregnancy in first trimester     7. Obesity affecting pregnancy in first trimester     8. Drug use affecting pregnancy in first trimester         JUAN Dougherty  2021  12:22 CST

## 2021-11-15 DIAGNOSIS — R73.09 ABNORMAL GLUCOSE: Primary | ICD-10-CM

## 2021-12-10 ENCOUNTER — ROUTINE PRENATAL (OUTPATIENT)
Dept: OBSTETRICS AND GYNECOLOGY | Facility: CLINIC | Age: 21
End: 2021-12-10

## 2021-12-10 VITALS — WEIGHT: 198 LBS | BODY MASS INDEX: 33.99 KG/M2 | DIASTOLIC BLOOD PRESSURE: 60 MMHG | SYSTOLIC BLOOD PRESSURE: 114 MMHG

## 2021-12-10 DIAGNOSIS — O99.342 DEPRESSION DURING PREGNANCY IN SECOND TRIMESTER: ICD-10-CM

## 2021-12-10 DIAGNOSIS — Z36.3 ENCOUNTER FOR ROUTINE SCREENING FOR FETAL MALFORMATION USING ULTRASOUND: ICD-10-CM

## 2021-12-10 DIAGNOSIS — O99.212 OBESITY AFFECTING PREGNANCY IN SECOND TRIMESTER: ICD-10-CM

## 2021-12-10 DIAGNOSIS — F32.A DEPRESSION DURING PREGNANCY IN SECOND TRIMESTER: ICD-10-CM

## 2021-12-10 DIAGNOSIS — O99.342 ANXIETY DURING PREGNANCY IN SECOND TRIMESTER, ANTEPARTUM: ICD-10-CM

## 2021-12-10 DIAGNOSIS — Z3A.16 16 WEEKS GESTATION OF PREGNANCY: Primary | ICD-10-CM

## 2021-12-10 DIAGNOSIS — F12.10 MARIJUANA ABUSE: ICD-10-CM

## 2021-12-10 DIAGNOSIS — O99.332 MATERNAL TOBACCO USE IN SECOND TRIMESTER: ICD-10-CM

## 2021-12-10 DIAGNOSIS — F41.9 ANXIETY DURING PREGNANCY IN SECOND TRIMESTER, ANTEPARTUM: ICD-10-CM

## 2021-12-10 DIAGNOSIS — O99.322 DRUG USE AFFECTING PREGNANCY IN SECOND TRIMESTER: ICD-10-CM

## 2021-12-10 DIAGNOSIS — O09.92 HIGH-RISK PREGNANCY IN SECOND TRIMESTER: ICD-10-CM

## 2021-12-10 PROCEDURE — 99212 OFFICE O/P EST SF 10 MIN: CPT | Performed by: NURSE PRACTITIONER

## 2021-12-10 NOTE — PROGRESS NOTES
CC: Prenatal visit    Breann Baltazar is a 21 y.o.  at 16w5d.  Doing well.  No complaints.  Denies contractions, LOF, or VB.      /60   Wt 89.8 kg (198 lb)   LMP  (LMP Unknown)   BMI 33.99 kg/m²              Problems (from 21 to present)     Problem Noted Resolved    High-risk pregnancy in second trimester 10/14/2021 by Magaly Ferrer APRN No    Maternal tobacco use in second trimester 10/14/2021 by Magaly Ferrer APRN No    Overview Signed 10/14/2021 10:59 AM by Magaly Ferrer APRN     1/2 ppd down to 5 cig/day at time of NOB visit         Marijuana abuse 10/14/2021 by Magaly Ferrer APRN No    Drug use affecting pregnancy in second trimester 10/14/2021 by Magaly Ferrer APRN No    Obesity affecting pregnancy in second trimester 10/14/2021 by Magaly Ferrer APRN No    Overview Signed 10/14/2021 10:59 AM by Magaly Ferrer APRN     Needs 1T glucola         Depression during pregnancy in second trimester 10/14/2021 by Magaly Ferrer APRN No    Overview Signed 10/14/2021 10:59 AM by Magaly Ferrer APRN     10/14: EDPS- 5         Anxiety during pregnancy in second trimester, antepartum 10/14/2021 by Magaly Ferrer APRN No    Overview Signed 10/14/2021 10:59 AM by Magaly Ferrer APRN     10/14: EDPS- 5         Primigravida in first trimester 10/14/2021 by Magaly Ferrer APRN 2021 by Regina Moreira APRN          A/P: Breann Baltazar is a 21 y.o.  at 16w5d.  Labs- failed 1 hour ogtt, sent information to schedule fasting 3 hour ogtt within next week or so  - RTC in 4 weeks for ENRIQUE appt and fetal anatomy scan     Diagnosis Plan   1. 16 weeks gestation of pregnancy     2. High-risk pregnancy in second trimester     3. Maternal tobacco use in second trimester  Discussed risks of smoking in pregnancy.  Encouraged her and partner to make goal of complete smoking cessation    4. Marijuana abuse     5. Anxiety during pregnancy in second trimester, antepartum     6.  Depression during pregnancy in second trimester     7. Obesity affecting pregnancy in second trimester     8. Drug use affecting pregnancy in second trimester         JUAN Dougherty  12/10/2021  10:32 CST

## 2021-12-14 ENCOUNTER — TELEPHONE (OUTPATIENT)
Dept: OBSTETRICS AND GYNECOLOGY | Facility: CLINIC | Age: 21
End: 2021-12-14

## 2021-12-14 DIAGNOSIS — J45.40 MODERATE PERSISTENT ASTHMA WITHOUT COMPLICATION: ICD-10-CM

## 2021-12-14 RX ORDER — ONDANSETRON 8 MG/1
8 TABLET, ORALLY DISINTEGRATING ORAL EVERY 8 HOURS PRN
Qty: 30 TABLET | Refills: 2 | Status: SHIPPED | OUTPATIENT
Start: 2021-12-14 | End: 2022-04-12

## 2021-12-14 RX ORDER — ALBUTEROL SULFATE 90 UG/1
2 AEROSOL, METERED RESPIRATORY (INHALATION) EVERY 4 HOURS PRN
Qty: 18 G | Refills: 5 | Status: SHIPPED | OUTPATIENT
Start: 2021-12-14 | End: 2022-05-05

## 2021-12-14 NOTE — TELEPHONE ENCOUNTER
PATIENT CALLED AND LOST HER MEDICATION DURING THE TORNADO. SHE IS NEEDING HER ondansetron ODT (Zofran ODT) 8 MG disintegrating tablet, albuterol sulfate HFA (Ventolin HFA) 108 (90 Base) MCG/ACT inhaler. SHE IS NEEDING THIS SENT TO [facility]. HER NUMBER -[id number].          THANK YOU,      SILVIO

## 2022-01-06 ENCOUNTER — TELEPHONE (OUTPATIENT)
Dept: OBSTETRICS AND GYNECOLOGY | Facility: CLINIC | Age: 22
End: 2022-01-06

## 2022-01-06 NOTE — TELEPHONE ENCOUNTER
Scan 1/11 at 9:00 calling to get patients an appt in [place] or [place] following her ultrasound that week. There was no answer

## 2022-01-12 DIAGNOSIS — Z36.3 ENCOUNTER FOR ROUTINE SCREENING FOR FETAL MALFORMATION USING ULTRASOUND: ICD-10-CM

## 2022-01-19 ENCOUNTER — ROUTINE PRENATAL (OUTPATIENT)
Dept: OBSTETRICS AND GYNECOLOGY | Facility: CLINIC | Age: 22
End: 2022-01-19

## 2022-01-19 ENCOUNTER — LAB (OUTPATIENT)
Dept: LAB | Facility: HOSPITAL | Age: 22
End: 2022-01-19

## 2022-01-19 VITALS — SYSTOLIC BLOOD PRESSURE: 118 MMHG | WEIGHT: 199 LBS | BODY MASS INDEX: 34.16 KG/M2 | DIASTOLIC BLOOD PRESSURE: 62 MMHG

## 2022-01-19 DIAGNOSIS — O09.92 HIGH-RISK PREGNANCY IN SECOND TRIMESTER: ICD-10-CM

## 2022-01-19 DIAGNOSIS — O99.342 DEPRESSION DURING PREGNANCY IN SECOND TRIMESTER: ICD-10-CM

## 2022-01-19 DIAGNOSIS — R73.09 ABNORMAL GLUCOSE: ICD-10-CM

## 2022-01-19 DIAGNOSIS — O99.342 ANXIETY DURING PREGNANCY IN SECOND TRIMESTER, ANTEPARTUM: ICD-10-CM

## 2022-01-19 DIAGNOSIS — O99.332 MATERNAL TOBACCO USE IN SECOND TRIMESTER: ICD-10-CM

## 2022-01-19 DIAGNOSIS — F32.A DEPRESSION DURING PREGNANCY IN SECOND TRIMESTER: ICD-10-CM

## 2022-01-19 DIAGNOSIS — Z3A.22 22 WEEKS GESTATION OF PREGNANCY: Primary | ICD-10-CM

## 2022-01-19 DIAGNOSIS — F41.9 ANXIETY DURING PREGNANCY IN SECOND TRIMESTER, ANTEPARTUM: ICD-10-CM

## 2022-01-19 DIAGNOSIS — Z03.79 SUSPECTED FETAL CONDITION NOT FOUND: ICD-10-CM

## 2022-01-19 DIAGNOSIS — F12.10 MARIJUANA ABUSE: ICD-10-CM

## 2022-01-19 DIAGNOSIS — O99.322 DRUG USE AFFECTING PREGNANCY IN SECOND TRIMESTER: ICD-10-CM

## 2022-01-19 DIAGNOSIS — O99.212 OBESITY AFFECTING PREGNANCY IN SECOND TRIMESTER: ICD-10-CM

## 2022-01-19 LAB
GLUCOSE 1H P 100 G GLC PO SERPL-MCNC: 151 MG/DL (ref 74–180)
GLUCOSE 2H P 100 G GLC PO SERPL-MCNC: 90 MG/DL (ref 74–155)
GLUCOSE 3H P 100 G GLC PO SERPL-MCNC: 79 MG/DL (ref 74–140)
GLUCOSE P FAST SERPL-MCNC: 82 MG/DL (ref 74–106)

## 2022-01-19 PROCEDURE — 99213 OFFICE O/P EST LOW 20 MIN: CPT | Performed by: NURSE PRACTITIONER

## 2022-01-19 PROCEDURE — 82951 GLUCOSE TOLERANCE TEST (GTT): CPT

## 2022-01-19 PROCEDURE — 82952 GTT-ADDED SAMPLES: CPT

## 2022-01-19 PROCEDURE — 36415 COLL VENOUS BLD VENIPUNCTURE: CPT

## 2022-01-19 NOTE — PROGRESS NOTES
CC: Prenatal visit    Breann Baltazar is a 21 y.o.  at 22w3d.  Doing well.  Patient feels her blood sugar runs low at times.  Earlier this week she got out of bed and felt like she may pass out, her symptoms resolved when she laid back down.  Denies contractions, LOF, or VB.  Reports good FM.    /62   Wt 90.3 kg (199 lb)   LMP  (LMP Unknown)   BMI 34.16 kg/m²   Reviewed fetal anatomy scan with pt and s/o- sub optimal views remain      Fetal Heart Rate: 140     Problems (from 21 to present)     Problem Noted Resolved    High-risk pregnancy in second trimester 10/14/2021 by Magaly Ferrer APRN No    Maternal tobacco use in second trimester 10/14/2021 by Magaly Ferrer APRN No    Overview Signed 10/14/2021 10:59 AM by Magaly Ferrer APRN     1/2 ppd down to 5 cig/day at time of NOB visit         Marijuana abuse 10/14/2021 by Magaly Ferrer APRN No    Drug use affecting pregnancy in second trimester 10/14/2021 by Magaly Ferrer APRN No    Obesity affecting pregnancy in second trimester 10/14/2021 by Magaly Ferrer APRN No    Overview Signed 10/14/2021 10:59 AM by Magaly Ferrer APRN     Needs 1T glucola         Depression during pregnancy in second trimester 10/14/2021 by Magaly Ferrer APRN No    Overview Signed 10/14/2021 10:59 AM by Magaly Ferrer APRN     10/14: EDPS- 5         Anxiety during pregnancy in second trimester, antepartum 10/14/2021 by Magayl Ferrer APRN No    Overview Signed 10/14/2021 10:59 AM by Magaly Ferrer APRN     10/14: EDPS- 5         Primigravida in first trimester 10/14/2021 by Magaly Ferrer APRN 2021 by Regina Moreira APRN          A/P: Breann Baltazar is a 21 y.o.  at 22w3d.  3 hour glucose challenge today, awaiting results  Encouraged 6 small protein meals daily to keep blood sugar regulated, change positions slowly to avoid OH  - RTC in 3 weeks in [place] for f/u TAUS for sub optimal views f/b ENRIQUE ulloa       Diagnosis Plan   1. 22 weeks gestation of pregnancy  US Ob Follow Up Transabdominal Approach   2. High-risk pregnancy in second trimester  US Ob Follow Up Transabdominal Approach   3. Maternal tobacco use in second trimester  US Ob Follow Up Transabdominal Approach   4. Marijuana abuse  US Ob Follow Up Transabdominal Approach   5. Anxiety during pregnancy in second trimester, antepartum  US Ob Follow Up Transabdominal Approach   6. Depression during pregnancy in second trimester  US Ob Follow Up Transabdominal Approach   7. Obesity affecting pregnancy in second trimester  US Ob Follow Up Transabdominal Approach   8. Drug use affecting pregnancy in second trimester  US Ob Follow Up Transabdominal Approach   9. Suspected fetal condition not found  US Ob Follow Up Transabdominal Approach       Regina Moreira, JUAN  1/19/2022  11:11 CST

## 2022-01-27 ENCOUNTER — HOSPITAL ENCOUNTER (EMERGENCY)
Facility: HOSPITAL | Age: 22
Discharge: HOME | End: 2022-01-27
Attending: FAMILY MEDICINE | Admitting: FAMILY MEDICINE

## 2022-01-27 VITALS
HEART RATE: 99 BPM | DIASTOLIC BLOOD PRESSURE: 52 MMHG | SYSTOLIC BLOOD PRESSURE: 108 MMHG | RESPIRATION RATE: 18 BRPM | OXYGEN SATURATION: 98 % | TEMPERATURE: 97.4 F

## 2022-01-27 DIAGNOSIS — B34.9 VIRAL ILLNESS: Primary | ICD-10-CM

## 2022-01-27 DIAGNOSIS — O23.42 URINARY TRACT INFECTION IN MOTHER DURING SECOND TRIMESTER OF PREGNANCY: ICD-10-CM

## 2022-01-27 LAB
ALBUMIN SERPL-MCNC: 3.7 G/DL (ref 3.5–5.2)
ALBUMIN/GLOB SERPL: 1.1 G/DL
ALP SERPL-CCNC: 83 U/L (ref 39–117)
ALT SERPL W P-5'-P-CCNC: 9 U/L (ref 1–33)
AMPHET+METHAMPHET UR QL: NEGATIVE
AMPHETAMINES UR QL: NEGATIVE
ANION GAP SERPL CALCULATED.3IONS-SCNC: 12 MMOL/L (ref 5–15)
AST SERPL-CCNC: 15 U/L (ref 1–32)
BACTERIA UR QL AUTO: ABNORMAL /HPF
BARBITURATES UR QL SCN: NEGATIVE
BASOPHILS # BLD AUTO: 0.02 10*3/MM3 (ref 0–0.2)
BASOPHILS NFR BLD AUTO: 0.2 % (ref 0–1.5)
BENZODIAZ UR QL SCN: NEGATIVE
BILIRUB SERPL-MCNC: 0.3 MG/DL (ref 0–1.2)
BILIRUB UR QL STRIP: ABNORMAL
BUN SERPL-MCNC: 7 MG/DL (ref 6–20)
BUN/CREAT SERPL: 10 (ref 7–25)
BUPRENORPHINE SERPL-MCNC: NEGATIVE NG/ML
CALCIUM SPEC-SCNC: 8.9 MG/DL (ref 8.6–10.5)
CANNABINOIDS SERPL QL: POSITIVE
CHLORIDE SERPL-SCNC: 104 MMOL/L (ref 98–107)
CLARITY UR: ABNORMAL
CO2 SERPL-SCNC: 21 MMOL/L (ref 22–29)
COCAINE UR QL: NEGATIVE
COLOR UR: ABNORMAL
CREAT SERPL-MCNC: 0.7 MG/DL (ref 0.57–1)
DEPRECATED RDW RBC AUTO: 41.7 FL (ref 37–54)
EOSINOPHIL # BLD AUTO: 0 10*3/MM3 (ref 0–0.4)
EOSINOPHIL NFR BLD AUTO: 0 % (ref 0.3–6.2)
ERYTHROCYTE [DISTWIDTH] IN BLOOD BY AUTOMATED COUNT: 13.3 % (ref 12.3–15.4)
FLUAV RNA RESP QL NAA+PROBE: NOT DETECTED
FLUBV RNA RESP QL NAA+PROBE: NOT DETECTED
GFR SERPL CREATININE-BSD FRML MDRD: 106 ML/MIN/1.73
GLOBULIN UR ELPH-MCNC: 3.4 GM/DL
GLUCOSE SERPL-MCNC: 89 MG/DL (ref 65–99)
GLUCOSE UR STRIP-MCNC: NEGATIVE MG/DL
HCT VFR BLD AUTO: 34.5 % (ref 34–46.6)
HGB BLD-MCNC: 11.7 G/DL (ref 12–15.9)
HGB UR QL STRIP.AUTO: NEGATIVE
HOLD SPECIMEN: NORMAL
HYALINE CASTS UR QL AUTO: ABNORMAL /LPF
IMM GRANULOCYTES # BLD AUTO: 0.12 10*3/MM3 (ref 0–0.05)
IMM GRANULOCYTES NFR BLD AUTO: 1.2 % (ref 0–0.5)
KETONES UR QL STRIP: ABNORMAL
LEUKOCYTE ESTERASE UR QL STRIP.AUTO: ABNORMAL
LYMPHOCYTES # BLD AUTO: 1.55 10*3/MM3 (ref 0.7–3.1)
LYMPHOCYTES NFR BLD AUTO: 15.4 % (ref 19.6–45.3)
MCH RBC QN AUTO: 29.4 PG (ref 26.6–33)
MCHC RBC AUTO-ENTMCNC: 33.9 G/DL (ref 31.5–35.7)
MCV RBC AUTO: 86.7 FL (ref 79–97)
METHADONE UR QL SCN: NEGATIVE
MONOCYTES # BLD AUTO: 0.46 10*3/MM3 (ref 0.1–0.9)
MONOCYTES NFR BLD AUTO: 4.6 % (ref 5–12)
NEUTROPHILS NFR BLD AUTO: 7.89 10*3/MM3 (ref 1.7–7)
NEUTROPHILS NFR BLD AUTO: 78.6 % (ref 42.7–76)
NITRITE UR QL STRIP: NEGATIVE
NRBC BLD AUTO-RTO: 0 /100 WBC (ref 0–0.2)
OPIATES UR QL: NEGATIVE
OXYCODONE UR QL SCN: NEGATIVE
PCP UR QL SCN: NEGATIVE
PH UR STRIP.AUTO: 5.5 [PH] (ref 5–9)
PLATELET # BLD AUTO: 241 10*3/MM3 (ref 140–450)
PMV BLD AUTO: 11.2 FL (ref 6–12)
POTASSIUM SERPL-SCNC: 3.8 MMOL/L (ref 3.5–5.2)
PROPOXYPH UR QL: NEGATIVE
PROT SERPL-MCNC: 7.1 G/DL (ref 6–8.5)
PROT UR QL STRIP: ABNORMAL
RBC # BLD AUTO: 3.98 10*6/MM3 (ref 3.77–5.28)
RBC # UR STRIP: ABNORMAL /HPF
REF LAB TEST METHOD: ABNORMAL
SARS-COV-2 RNA RESP QL NAA+PROBE: NOT DETECTED
SODIUM SERPL-SCNC: 137 MMOL/L (ref 136–145)
SP GR UR STRIP: 1.03 (ref 1–1.03)
SQUAMOUS #/AREA URNS HPF: ABNORMAL /HPF
TRICYCLICS UR QL SCN: NEGATIVE
UROBILINOGEN UR QL STRIP: ABNORMAL
WBC # UR STRIP: ABNORMAL /HPF
WBC NRBC COR # BLD: 10.04 10*3/MM3 (ref 3.4–10.8)

## 2022-01-27 PROCEDURE — 80053 COMPREHEN METABOLIC PANEL: CPT | Performed by: NURSE PRACTITIONER

## 2022-01-27 PROCEDURE — 85025 COMPLETE CBC W/AUTO DIFF WBC: CPT | Performed by: NURSE PRACTITIONER

## 2022-01-27 PROCEDURE — 25010000002 ONDANSETRON PER 1 MG: Performed by: NURSE PRACTITIONER

## 2022-01-27 PROCEDURE — 87636 SARSCOV2 & INF A&B AMP PRB: CPT | Performed by: NURSE PRACTITIONER

## 2022-01-27 PROCEDURE — 96374 THER/PROPH/DIAG INJ IV PUSH: CPT

## 2022-01-27 PROCEDURE — 99284 EMERGENCY DEPT VISIT MOD MDM: CPT

## 2022-01-27 PROCEDURE — 81001 URINALYSIS AUTO W/SCOPE: CPT | Performed by: NURSE PRACTITIONER

## 2022-01-27 PROCEDURE — 80306 DRUG TEST PRSMV INSTRMNT: CPT | Performed by: NURSE PRACTITIONER

## 2022-01-27 RX ORDER — ONDANSETRON 2 MG/ML
4 INJECTION INTRAMUSCULAR; INTRAVENOUS ONCE
Status: COMPLETED | OUTPATIENT
Start: 2022-01-27 | End: 2022-01-27

## 2022-01-27 RX ORDER — SODIUM CHLORIDE 0.9 % (FLUSH) 0.9 %
10 SYRINGE (ML) INJECTION AS NEEDED
Status: DISCONTINUED | OUTPATIENT
Start: 2022-01-27 | End: 2022-01-27 | Stop reason: HOSPADM

## 2022-01-27 RX ORDER — CEPHALEXIN 500 MG/1
500 CAPSULE ORAL 2 TIMES DAILY
Qty: 10 CAPSULE | Refills: 0 | Status: SHIPPED | OUTPATIENT
Start: 2022-01-27 | End: 2022-02-01

## 2022-01-27 RX ORDER — CEPHALEXIN 500 MG/1
500 CAPSULE ORAL ONCE
Status: COMPLETED | OUTPATIENT
Start: 2022-01-27 | End: 2022-01-27

## 2022-01-27 RX ADMIN — ONDANSETRON 4 MG: 2 INJECTION INTRAMUSCULAR; INTRAVENOUS at 10:20

## 2022-01-27 RX ADMIN — SODIUM CHLORIDE 1000 ML: 9 INJECTION, SOLUTION INTRAVENOUS at 10:20

## 2022-01-27 RX ADMIN — SODIUM CHLORIDE 1000 ML: 9 INJECTION, SOLUTION INTRAVENOUS at 12:50

## 2022-01-27 RX ADMIN — CEPHALEXIN 500 MG: 500 CAPSULE ORAL at 14:01

## 2022-02-02 ENCOUNTER — ROUTINE PRENATAL (OUTPATIENT)
Dept: OBSTETRICS AND GYNECOLOGY | Facility: CLINIC | Age: 22
End: 2022-02-02

## 2022-02-02 VITALS — WEIGHT: 202 LBS | DIASTOLIC BLOOD PRESSURE: 62 MMHG | BODY MASS INDEX: 34.67 KG/M2 | SYSTOLIC BLOOD PRESSURE: 98 MMHG

## 2022-02-02 DIAGNOSIS — O99.342 ANXIETY DURING PREGNANCY IN SECOND TRIMESTER, ANTEPARTUM: ICD-10-CM

## 2022-02-02 DIAGNOSIS — F32.A DEPRESSION DURING PREGNANCY IN SECOND TRIMESTER: ICD-10-CM

## 2022-02-02 DIAGNOSIS — Z36.2 ENCOUNTER FOR OTHER ANTENATAL SCREENING FOLLOW-UP: ICD-10-CM

## 2022-02-02 DIAGNOSIS — O99.322 DRUG USE AFFECTING PREGNANCY IN SECOND TRIMESTER: ICD-10-CM

## 2022-02-02 DIAGNOSIS — O09.92 HIGH-RISK PREGNANCY IN SECOND TRIMESTER: ICD-10-CM

## 2022-02-02 DIAGNOSIS — O99.810 GLUCOSE INTOLERANCE OF PREGNANCY: ICD-10-CM

## 2022-02-02 DIAGNOSIS — O99.212 OBESITY AFFECTING PREGNANCY IN SECOND TRIMESTER: ICD-10-CM

## 2022-02-02 DIAGNOSIS — O99.332 MATERNAL TOBACCO USE IN SECOND TRIMESTER: Primary | ICD-10-CM

## 2022-02-02 DIAGNOSIS — F12.10 MARIJUANA ABUSE: ICD-10-CM

## 2022-02-02 DIAGNOSIS — Z3A.24 24 WEEKS GESTATION OF PREGNANCY: ICD-10-CM

## 2022-02-02 DIAGNOSIS — F41.9 ANXIETY DURING PREGNANCY IN SECOND TRIMESTER, ANTEPARTUM: ICD-10-CM

## 2022-02-02 DIAGNOSIS — O99.342 DEPRESSION DURING PREGNANCY IN SECOND TRIMESTER: ICD-10-CM

## 2022-02-02 PROCEDURE — 99214 OFFICE O/P EST MOD 30 MIN: CPT | Performed by: NURSE PRACTITIONER

## 2022-02-02 NOTE — PROGRESS NOTES
CC: Prenatal visit    Breann Baltazar is a 22 y.o.  at 24w3d.  Doing well.  Denies dysuria, abnormal vaginal d/c, headaches, heartburn, constipation, cramping, regular contractions, LOF, or VB.  Reports good FM.    BP 98/62   Wt 91.6 kg (202 lb)   LMP  (LMP Unknown)   BMI 34.67 kg/m²   SVE: NA    Anatomy u/s preliminary report reviewed with patient. Subopts noted but anatomy seen today was noted to appear normal at this time. Posterior placenta w/o previa, with normal insertion of a 3VC.      Fetal Heart Rate: 123     Problems (from 21 to present)     Problem Noted Resolved    High-risk pregnancy in second trimester 10/14/2021 by Magaly Ferrer APRN No    Maternal tobacco use in second trimester 10/14/2021 by Magaly Ferrer APRN No    Overview Signed 10/14/2021 10:59 AM by Magaly Ferrer APRN     1/2 ppd down to 5 cig/day at time of NOB visit         Marijuana abuse 10/14/2021 by Magaly Ferrer APRN No    Drug use affecting pregnancy in second trimester 10/14/2021 by Magaly Ferrer APRN No    Obesity affecting pregnancy in second trimester 10/14/2021 by Magaly Ferrer APRN No    Overview Signed 10/14/2021 10:59 AM by Magaly Ferrer APRN     Needs 1T glucola         Depression during pregnancy in second trimester 10/14/2021 by Magaly Ferrer APRN No    Overview Signed 10/14/2021 10:59 AM by Magaly Ferrer APRN     10/14: EDPS- 5         Anxiety during pregnancy in second trimester, antepartum 10/14/2021 by Magaly Ferrer APRN No    Overview Signed 10/14/2021 10:59 AM by Magaly Ferrer APRN     10/14: EDPS- 5         Primigravida in first trimester 10/14/2021 by Magaly Ferrer APRN 2021 by Regina Moreira APRN          A/P: Breann Baltazar is a 22 y.o.  at 24w3d.  - RTC in 4 weeks  - Reviewed COVID-19 visitation policy  - Reviewed COVID-19 precautions     Diagnosis Plan   1. Maternal tobacco use in second trimester  Encouraged cessation.   2. Marijuana abuse   "\" \"   3. Anxiety during pregnancy in second trimester, antepartum  Doing well at this time.   4. Depression during pregnancy in second trimester     5. Obesity affecting pregnancy in second trimester     6. Drug use affecting pregnancy in second trimester     7. High-risk pregnancy in second trimester     8. 24 weeks gestation of pregnancy     9. Glucose intolerance of pregnancy  Glucose Tolerance, 3 Hours   10. Encounter for other  screening follow-up  US ob follow up transabdominal approach     Magaly Ferrer, APRN  2022  14:22 CST    "

## 2022-02-07 DIAGNOSIS — O99.332 MATERNAL TOBACCO USE IN SECOND TRIMESTER: ICD-10-CM

## 2022-02-07 DIAGNOSIS — O09.92 HIGH-RISK PREGNANCY IN SECOND TRIMESTER: ICD-10-CM

## 2022-02-07 DIAGNOSIS — O99.322 DRUG USE AFFECTING PREGNANCY IN SECOND TRIMESTER: ICD-10-CM

## 2022-02-07 DIAGNOSIS — O99.342 DEPRESSION DURING PREGNANCY IN SECOND TRIMESTER: ICD-10-CM

## 2022-02-07 DIAGNOSIS — F41.9 ANXIETY DURING PREGNANCY IN SECOND TRIMESTER, ANTEPARTUM: ICD-10-CM

## 2022-02-07 DIAGNOSIS — O99.342 ANXIETY DURING PREGNANCY IN SECOND TRIMESTER, ANTEPARTUM: ICD-10-CM

## 2022-02-07 DIAGNOSIS — F32.A DEPRESSION DURING PREGNANCY IN SECOND TRIMESTER: ICD-10-CM

## 2022-02-07 DIAGNOSIS — Z3A.22 22 WEEKS GESTATION OF PREGNANCY: ICD-10-CM

## 2022-02-07 DIAGNOSIS — Z03.79 SUSPECTED FETAL CONDITION NOT FOUND: ICD-10-CM

## 2022-02-07 DIAGNOSIS — O99.212 OBESITY AFFECTING PREGNANCY IN SECOND TRIMESTER: ICD-10-CM

## 2022-02-07 DIAGNOSIS — F12.10 MARIJUANA ABUSE: ICD-10-CM

## 2022-02-21 ENCOUNTER — TELEPHONE (OUTPATIENT)
Dept: LABOR AND DELIVERY | Facility: HOSPITAL | Age: 22
End: 2022-02-21

## 2022-02-21 NOTE — TELEPHONE ENCOUNTER
Explained Motherhood Connection program and patient would like to participate.  Intake call scheduled for 3/1/22 at 10:30 am.

## 2022-02-24 ENCOUNTER — ROUTINE PRENATAL (OUTPATIENT)
Dept: OBSTETRICS AND GYNECOLOGY | Facility: CLINIC | Age: 22
End: 2022-02-24

## 2022-02-24 VITALS — BODY MASS INDEX: 34.5 KG/M2 | SYSTOLIC BLOOD PRESSURE: 120 MMHG | WEIGHT: 201 LBS | DIASTOLIC BLOOD PRESSURE: 70 MMHG

## 2022-02-24 DIAGNOSIS — O99.212 OBESITY AFFECTING PREGNANCY IN SECOND TRIMESTER: ICD-10-CM

## 2022-02-24 DIAGNOSIS — O09.92 HIGH-RISK PREGNANCY IN SECOND TRIMESTER: ICD-10-CM

## 2022-02-24 DIAGNOSIS — F32.A DEPRESSION DURING PREGNANCY IN SECOND TRIMESTER: ICD-10-CM

## 2022-02-24 DIAGNOSIS — O99.342 ANXIETY DURING PREGNANCY IN SECOND TRIMESTER, ANTEPARTUM: ICD-10-CM

## 2022-02-24 DIAGNOSIS — Z3A.27 27 WEEKS GESTATION OF PREGNANCY: ICD-10-CM

## 2022-02-24 DIAGNOSIS — O99.322 DRUG USE AFFECTING PREGNANCY IN SECOND TRIMESTER: ICD-10-CM

## 2022-02-24 DIAGNOSIS — F12.10 MARIJUANA ABUSE: ICD-10-CM

## 2022-02-24 DIAGNOSIS — N90.7 SEBACEOUS CYST OF LABIA: Primary | ICD-10-CM

## 2022-02-24 DIAGNOSIS — O99.332 MATERNAL TOBACCO USE IN SECOND TRIMESTER: ICD-10-CM

## 2022-02-24 DIAGNOSIS — F41.9 ANXIETY DURING PREGNANCY IN SECOND TRIMESTER, ANTEPARTUM: ICD-10-CM

## 2022-02-24 DIAGNOSIS — O99.342 DEPRESSION DURING PREGNANCY IN SECOND TRIMESTER: ICD-10-CM

## 2022-02-24 PROCEDURE — 99213 OFFICE O/P EST LOW 20 MIN: CPT | Performed by: NURSE PRACTITIONER

## 2022-02-24 RX ORDER — CLINDAMYCIN HYDROCHLORIDE 300 MG/1
CAPSULE ORAL
COMMUNITY
Start: 2022-02-21 | End: 2022-04-12

## 2022-02-24 NOTE — PROGRESS NOTES
"CC: has a \"knot\" on her labia and was seen in the ER for it Monday in [facility].    Breann Baltazar is a 22 y.o.  at 27w4d.  Doing well.  Denies dysuria, abnormal vaginal d/c, headaches, heartburn, constipation, cramping, regular contractions, LOF, or VB.  Reports good FM. Noticed swelling and tenderness in her labia on Monday and when she checked it there was a swollen \"knot\". She was seen in the ER at [facility]. And was given antibiotics but wanted to f/u here to be sure everything was ok.    /70   Wt 91.2 kg (201 lb)   LMP  (LMP Unknown)   BMI 34.50 kg/m²   SVE: NA  Vulvar exam revealed a sub-centimeter sebaceous cyst on the left labia minora. No drainage at this time.  Fundal Height (cm): 28 cm  Fetal Heart Rate: 152     Problems (from 21 to present)     Problem Noted Resolved    High-risk pregnancy in second trimester 10/14/2021 by Magaly Ferrer, JUAN No    Maternal tobacco use in second trimester 10/14/2021 by Magaly Ferrer APRN No    Overview Signed 10/14/2021 10:59 AM by Magaly Ferrer APRN     1/2 ppd down to 5 cig/day at time of NOB visit         Marijuana abuse 10/14/2021 by Magaly Ferrer, JUAN No    Drug use affecting pregnancy in second trimester 10/14/2021 by Magaly Ferrer APRN No    Obesity affecting pregnancy in second trimester 10/14/2021 by Magaly Ferrer APRN No    Overview Signed 10/14/2021 10:59 AM by Magaly Ferrer APRN     Needs 1T glucola         Depression during pregnancy in second trimester 10/14/2021 by Magaly Ferrer APRN No    Overview Signed 10/14/2021 10:59 AM by Magaly Ferrer APRN     10/14: EDPS- 5         Anxiety during pregnancy in second trimester, antepartum 10/14/2021 by Magaly Ferrer APRN No    Overview Signed 10/14/2021 10:59 AM by Magaly Ferrer APRN     10/14: EDPS- 5         Primigravida in first trimester 10/14/2021 by Magaly Ferrer, APRN 2021 by Regina Moreira, APRN          A/P: Breann Baltazar is a " 22 y.o.  at 27w4d.  - RTC in 1-2 weeks as previously scheduled  - Reviewed COVID-19 visitation policy  - Reviewed COVID-19 precautions     Diagnosis Plan   1. Sebaceous cyst of labia  Finish all oral antibiotics prescribed. Use warm, moist compresses 3-4 times daily until feeling better. RTC if worsening.   2. Maternal tobacco use in second trimester     3. Marijuana abuse     4. Anxiety during pregnancy in second trimester, antepartum     5. Depression during pregnancy in second trimester     6. Obesity affecting pregnancy in second trimester     7. Drug use affecting pregnancy in second trimester     8. High-risk pregnancy in second trimester     9. 27 weeks gestation of pregnancy       Magaly Ferrer, APRN  2022  14:22 CST

## 2022-03-01 ENCOUNTER — TELEPHONE (OUTPATIENT)
Dept: OBSTETRICS AND GYNECOLOGY | Facility: HOSPITAL | Age: 22
End: 2022-03-01

## 2022-03-01 NOTE — TELEPHONE ENCOUNTER
Attempted to contact patient to complete intake call for Motherhood Connection program.  Home and listed Mobile phone N/A with no voicemail option.  Called [phone] and left voicemail with return contact information.

## 2022-03-10 ENCOUNTER — REFERRAL TRIAGE (OUTPATIENT)
Dept: LABOR AND DELIVERY | Facility: HOSPITAL | Age: 22
End: 2022-03-10

## 2022-03-10 DIAGNOSIS — O99.212 OBESITY AFFECTING PREGNANCY IN SECOND TRIMESTER: ICD-10-CM

## 2022-03-10 DIAGNOSIS — O99.342 ANXIETY DURING PREGNANCY IN SECOND TRIMESTER, ANTEPARTUM: ICD-10-CM

## 2022-03-10 DIAGNOSIS — O99.322 DRUG USE AFFECTING PREGNANCY IN SECOND TRIMESTER: ICD-10-CM

## 2022-03-10 DIAGNOSIS — F12.10 MARIJUANA ABUSE: ICD-10-CM

## 2022-03-10 DIAGNOSIS — O09.92 HIGH-RISK PREGNANCY IN SECOND TRIMESTER: ICD-10-CM

## 2022-03-10 DIAGNOSIS — O99.342 DEPRESSION DURING PREGNANCY IN SECOND TRIMESTER: ICD-10-CM

## 2022-03-10 DIAGNOSIS — O99.332 MATERNAL TOBACCO USE IN SECOND TRIMESTER: Primary | ICD-10-CM

## 2022-03-10 DIAGNOSIS — F32.A DEPRESSION DURING PREGNANCY IN SECOND TRIMESTER: ICD-10-CM

## 2022-03-10 DIAGNOSIS — F41.9 ANXIETY DURING PREGNANCY IN SECOND TRIMESTER, ANTEPARTUM: ICD-10-CM

## 2022-03-14 ENCOUNTER — PATIENT OUTREACH (OUTPATIENT)
Dept: LABOR AND DELIVERY | Facility: HOSPITAL | Age: 22
End: 2022-03-14

## 2022-03-14 NOTE — OUTREACH NOTE
Motherhood Connection  Check-In        Counseled patient strongly on importance of keeping prenatal appointments.  Gave PACS phone number for transportation assistance.    Verna Williamson RN  Maternity Nurse Navigator    3/14/2022, 14:30 CDT      Intake    Questions/Answers    Flowsheet Row Responses   Best Method for Contacting Cell   Do you have a dentist? No   Other Specialty Providers: Planning check up after baby is born   Resources Presently Utilizing: WIC (Women, Infant, Children)

## 2022-03-16 ENCOUNTER — LAB (OUTPATIENT)
Dept: LAB | Facility: HOSPITAL | Age: 22
End: 2022-03-16

## 2022-03-16 DIAGNOSIS — O99.810 GLUCOSE INTOLERANCE OF PREGNANCY: ICD-10-CM

## 2022-03-16 LAB
GLUCOSE 1H P 100 G GLC PO SERPL-MCNC: 135 MG/DL (ref 74–180)
GLUCOSE 2H P 100 G GLC PO SERPL-MCNC: 80 MG/DL (ref 74–155)
GLUCOSE 3H P 100 G GLC PO SERPL-MCNC: 94 MG/DL (ref 74–140)
GLUCOSE P FAST SERPL-MCNC: 81 MG/DL (ref 74–106)

## 2022-03-16 PROCEDURE — 82951 GLUCOSE TOLERANCE TEST (GTT): CPT

## 2022-03-16 PROCEDURE — 36415 COLL VENOUS BLD VENIPUNCTURE: CPT

## 2022-03-16 PROCEDURE — 82952 GTT-ADDED SAMPLES: CPT

## 2022-03-17 ENCOUNTER — ROUTINE PRENATAL (OUTPATIENT)
Dept: OBSTETRICS AND GYNECOLOGY | Facility: CLINIC | Age: 22
End: 2022-03-17

## 2022-03-17 VITALS — DIASTOLIC BLOOD PRESSURE: 64 MMHG | BODY MASS INDEX: 34.16 KG/M2 | WEIGHT: 199 LBS | SYSTOLIC BLOOD PRESSURE: 102 MMHG

## 2022-03-17 DIAGNOSIS — O09.93 HIGH-RISK PREGNANCY IN THIRD TRIMESTER: ICD-10-CM

## 2022-03-17 DIAGNOSIS — Z3A.30 30 WEEKS GESTATION OF PREGNANCY: Primary | ICD-10-CM

## 2022-03-17 DIAGNOSIS — O99.332 MATERNAL TOBACCO USE IN SECOND TRIMESTER: ICD-10-CM

## 2022-03-17 DIAGNOSIS — F12.10 MARIJUANA ABUSE: ICD-10-CM

## 2022-03-17 PROCEDURE — 99214 OFFICE O/P EST MOD 30 MIN: CPT | Performed by: NURSE PRACTITIONER

## 2022-03-17 NOTE — PROGRESS NOTES
"CC: Prenatal visit    Breann Baltazar is a 22 y.o.  at 30w4d.  Doing well.  Denies contractions, LOF, or VB.  Reports good FM. Having some back pain and round ligament pain.     /64   Wt 90.3 kg (199 lb)   LMP  (LMP Unknown)   BMI 34.16 kg/m²   SVE: Deferred     Fetal Heart Rate: 141 us     Preliminary US for subopts: cephalic, YULI 18.22cm BPP 8/8. EFW 1479 gm 3lb 4oz 23%ile. Remaining views of the spine and abdomen are normal in appearance.      Problems (from 21 to present)     Problem Noted Resolved    High-risk pregnancy in third trimester 10/14/2021 by Magayl Ferrer APRN No    Overview Signed 3/17/2022 12:25 PM by Lacey Santizo APRN     Baby girl \"Gerda Santiago\"  BC?  Breast/bottle?           Maternal tobacco use in second trimester 10/14/2021 by Magaly Ferrer APRN No    Overview Signed 10/14/2021 10:59 AM by Magaly Ferrer APRN     1/2 ppd down to 5 cig/day at time of NOB visit           Marijuana abuse 10/14/2021 by Magaly Ferrer APRN No    Drug use affecting pregnancy in second trimester 10/14/2021 by Magaly Ferrer APRN No    Obesity affecting pregnancy in second trimester 10/14/2021 by Magaly Ferrer APRN No    Overview Addendum 3/17/2022 12:00 PM by Lacey Santizo APRN     Passed 3 hr glucola, no GDM.            Previous Version    Depression during pregnancy in second trimester 10/14/2021 by Magaly Ferrer APRN No    Overview Signed 10/14/2021 10:59 AM by Magaly Ferrer APRN     10/14: EDPS- 5           Anxiety during pregnancy in second trimester, antepartum 10/14/2021 by Magaly Ferrer APRN No    Overview Signed 10/14/2021 10:59 AM by Magaly Ferrer APRN     10/14: EDPS- 5           Primigravida in first trimester 10/14/2021 by Magaly Ferrer APRN 2021 by Regina Moreira APRN          A/P: Breann Lugo [place] is a 22 y.o.  at 30w4d.  - Anatomy complete  - Discussed normal pregnancy discomforts, fetal kick count, and return " precautions.  - [place] flyer given  - Has not met a delivering provider, will schedule in [place] in 2 weeks.  - Reviewed COVID-19 visitation policy  - Reviewed COVID-19 precautions     Diagnosis Plan   1. 30 weeks gestation of pregnancy     2. Maternal tobacco use in second trimester     3. Marijuana abuse     4. High-risk pregnancy in third trimester       Lacey Huerta, APRN  3/17/2022  12:25 CDT

## 2022-03-18 DIAGNOSIS — Z36.2 ENCOUNTER FOR OTHER ANTENATAL SCREENING FOLLOW-UP: ICD-10-CM

## 2022-03-30 ENCOUNTER — PATIENT OUTREACH (OUTPATIENT)
Dept: OBSTETRICS AND GYNECOLOGY | Facility: HOSPITAL | Age: 22
End: 2022-03-30

## 2022-03-30 NOTE — OUTREACH NOTE
Attempted outreach for 36 week check in.  Patient N/A, left voicemail with return contact information.

## 2022-04-12 ENCOUNTER — ROUTINE PRENATAL (OUTPATIENT)
Dept: OBSTETRICS AND GYNECOLOGY | Facility: CLINIC | Age: 22
End: 2022-04-12

## 2022-04-12 VITALS — DIASTOLIC BLOOD PRESSURE: 78 MMHG | SYSTOLIC BLOOD PRESSURE: 112 MMHG | WEIGHT: 200 LBS | BODY MASS INDEX: 34.33 KG/M2

## 2022-04-12 DIAGNOSIS — F41.9 ANXIETY DURING PREGNANCY IN SECOND TRIMESTER, ANTEPARTUM: ICD-10-CM

## 2022-04-12 DIAGNOSIS — O09.93 HIGH-RISK PREGNANCY IN THIRD TRIMESTER: ICD-10-CM

## 2022-04-12 DIAGNOSIS — F32.A DEPRESSION DURING PREGNANCY IN SECOND TRIMESTER: ICD-10-CM

## 2022-04-12 DIAGNOSIS — O99.212 OBESITY AFFECTING PREGNANCY IN SECOND TRIMESTER: ICD-10-CM

## 2022-04-12 DIAGNOSIS — Z3A.34 34 WEEKS GESTATION OF PREGNANCY: ICD-10-CM

## 2022-04-12 DIAGNOSIS — O99.332 MATERNAL TOBACCO USE IN SECOND TRIMESTER: Primary | ICD-10-CM

## 2022-04-12 DIAGNOSIS — F12.10 MARIJUANA ABUSE: ICD-10-CM

## 2022-04-12 DIAGNOSIS — O99.322 DRUG USE AFFECTING PREGNANCY IN SECOND TRIMESTER: ICD-10-CM

## 2022-04-12 DIAGNOSIS — O99.342 DEPRESSION DURING PREGNANCY IN SECOND TRIMESTER: ICD-10-CM

## 2022-04-12 DIAGNOSIS — O99.342 ANXIETY DURING PREGNANCY IN SECOND TRIMESTER, ANTEPARTUM: ICD-10-CM

## 2022-04-12 PROCEDURE — 99213 OFFICE O/P EST LOW 20 MIN: CPT | Performed by: OBSTETRICS & GYNECOLOGY

## 2022-04-12 NOTE — PROGRESS NOTES
"CC: Prenatal visit    Breann Baltazar is a 22 y.o.  at 34w2d.  Doing well.  Denies contractions, LOF, or VB.  Reports good FM.    /78   Wt 90.7 kg (200 lb)   LMP  (LMP Unknown)   BMI 34.33 kg/m²   Fundal Height (cm): 34 cm  Fetal Heart Rate: 130     Problems (from 21 to present)     Problem Noted Resolved    High-risk pregnancy in third trimester 10/14/2021 by Magaly Ferrer APRN No    Overview Signed 3/17/2022 12:25 PM by Lacey Santizo APRN     Baby girl \"Gerda Santiago\"  BC?  Breast/bottle?           Maternal tobacco use in second trimester 10/14/2021 by Magaly Ferrer APRN No    Overview Signed 10/14/2021 10:59 AM by Magaly Ferrer APRN     1/2 ppd down to 5 cig/day at time of NOB visit           Marijuana abuse 10/14/2021 by Magaly Ferrer APRN No    Drug use affecting pregnancy in second trimester 10/14/2021 by Magaly Ferrer APRN No    Obesity affecting pregnancy in second trimester 10/14/2021 by Magaly Ferrer APRN No    Overview Addendum 3/17/2022 12:00 PM by Lacey Santizo APRN     Passed 3 hr glucola, no GDM.            Previous Version    Depression during pregnancy in second trimester 10/14/2021 by Magaly Ferrer APRN No    Overview Signed 10/14/2021 10:59 AM by Magaly Ferrer APRN     10/14: EDPS- 5           Anxiety during pregnancy in second trimester, antepartum 10/14/2021 by Magaly Ferrer APRN No    Overview Signed 10/14/2021 10:59 AM by Magaly Ferrer APRN     10/14: EDPS- 5           Primigravida in first trimester 10/14/2021 by Magaly Ferrer APRN 2021 by Regina Moreira APRN          A/P: Breann Baltazar is a 22 y.o.  at 34w2d.  Overall doing well with appropriately progressing pregnancy at this time.  I reviewed the new OB labs.  Also reviewed 28-week labs to include Glucola and 3-hour glucose testing.  3-hour glucose testing was normal.  Patient overall doing well.  I have also reviewed anatomy ultrasounds which " were reassuring.  Patient to return in 2 weeks, sooner as needed.  Fetal kick count and  labor precautions given.  - RTC in 2 weeks  - Reviewed COVID-19 visitation policy  - Reviewed COVID-19 precautions     Diagnosis Plan   1. Maternal tobacco use in second trimester     2. Marijuana abuse     3. Anxiety during pregnancy in second trimester, antepartum     4. Depression during pregnancy in second trimester     5. Obesity affecting pregnancy in second trimester     6. Drug use affecting pregnancy in second trimester     7. High-risk pregnancy in third trimester     8. 34 weeks gestation of pregnancy       Mason Espinoza DO  2022  13:40 CDT

## 2022-04-13 ENCOUNTER — PATIENT OUTREACH (OUTPATIENT)
Dept: LABOR AND DELIVERY | Facility: HOSPITAL | Age: 22
End: 2022-04-13

## 2022-04-13 DIAGNOSIS — F12.10 MARIJUANA ABUSE: ICD-10-CM

## 2022-04-13 DIAGNOSIS — O99.212 OBESITY AFFECTING PREGNANCY IN SECOND TRIMESTER: ICD-10-CM

## 2022-04-13 DIAGNOSIS — F41.9 ANXIETY DURING PREGNANCY IN SECOND TRIMESTER, ANTEPARTUM: ICD-10-CM

## 2022-04-13 DIAGNOSIS — O99.332 MATERNAL TOBACCO USE IN SECOND TRIMESTER: Primary | ICD-10-CM

## 2022-04-13 DIAGNOSIS — O99.342 ANXIETY DURING PREGNANCY IN SECOND TRIMESTER, ANTEPARTUM: ICD-10-CM

## 2022-04-13 DIAGNOSIS — O99.322 DRUG USE AFFECTING PREGNANCY IN SECOND TRIMESTER: ICD-10-CM

## 2022-04-13 DIAGNOSIS — O99.342 DEPRESSION DURING PREGNANCY IN SECOND TRIMESTER: ICD-10-CM

## 2022-04-13 DIAGNOSIS — O09.93 HIGH-RISK PREGNANCY IN THIRD TRIMESTER: ICD-10-CM

## 2022-04-13 DIAGNOSIS — F32.A DEPRESSION DURING PREGNANCY IN SECOND TRIMESTER: ICD-10-CM

## 2022-04-13 NOTE — OUTREACH NOTE
"Motherhood Connection  Check-In    Questions/Answers    Flowsheet Row Responses   Best Method for Contacting Cell   Demographics Reviewed Yes   Currently Employed No   Able to keep appointments as scheduled Yes   Gender(s) and Name(s) Girl \"Jessica\"   Baby Active/Feeling Fetal Movemen Yes   How are you presently feeling? Good, just dealing with stress of moving and all. I'm excited about this new bigger house.   Questions regarding prenatal visits or tests to be ordered? No   Education related to new diagnoses/home equipment No   May I ask you questions about your substance use? Yes   Special Considerations Birthing Ball   Supplies ready for baby Breast Pump, Car Seat, Clothing, Crib, Diapers, Feeding Supplies  [Has donated breast pump, calling insurance to get new pump]   Resource/Environmental Concerns None   Do you have any questions related to your care experience, your pregnancy, plans for delivery, any concerns, etc? Yes   Question Visitor policy, [place] contractions, Wic, Hands discussed at length.          Encouraged patient to reach out to WIC, [facility], and [facility] for [facility]. Also encouraged patient to reach out to her dentist for a visit.  Discussed current visitor policies in Labor and Delivery.  Mother had questions regarding positive THC screens in early pregnancy, discussed policy for UDS and Meconium screens on baby after birth and case management consults.  Questions and concerns addressed.    Verna Williamson RN  Maternity Nurse Navigator    4/13/2022, 10:34 CDT        "

## 2022-04-27 ENCOUNTER — PATIENT OUTREACH (OUTPATIENT)
Dept: LABOR AND DELIVERY | Facility: HOSPITAL | Age: 22
End: 2022-04-27

## 2022-04-27 ENCOUNTER — APPOINTMENT (OUTPATIENT)
Dept: ULTRASOUND IMAGING | Facility: HOSPITAL | Age: 22
End: 2022-04-27

## 2022-04-27 ENCOUNTER — HOSPITAL ENCOUNTER (OUTPATIENT)
Facility: HOSPITAL | Age: 22
Discharge: HOME | End: 2022-04-27
Attending: OBSTETRICS & GYNECOLOGY | Admitting: OBSTETRICS & GYNECOLOGY

## 2022-04-27 ENCOUNTER — APPOINTMENT (OUTPATIENT)
Dept: CT IMAGING | Facility: HOSPITAL | Age: 22
End: 2022-04-27

## 2022-04-27 VITALS
TEMPERATURE: 98.2 F | SYSTOLIC BLOOD PRESSURE: 109 MMHG | RESPIRATION RATE: 20 BRPM | DIASTOLIC BLOOD PRESSURE: 52 MMHG | OXYGEN SATURATION: 95 % | HEART RATE: 82 BPM

## 2022-04-27 DIAGNOSIS — O99.342 ANXIETY DURING PREGNANCY IN SECOND TRIMESTER, ANTEPARTUM: ICD-10-CM

## 2022-04-27 DIAGNOSIS — O99.332 MATERNAL TOBACCO USE IN SECOND TRIMESTER: Primary | ICD-10-CM

## 2022-04-27 DIAGNOSIS — O99.212 OBESITY AFFECTING PREGNANCY IN SECOND TRIMESTER: ICD-10-CM

## 2022-04-27 DIAGNOSIS — F32.A DEPRESSION DURING PREGNANCY IN SECOND TRIMESTER: ICD-10-CM

## 2022-04-27 DIAGNOSIS — O99.342 DEPRESSION DURING PREGNANCY IN SECOND TRIMESTER: ICD-10-CM

## 2022-04-27 DIAGNOSIS — F12.10 MARIJUANA ABUSE: ICD-10-CM

## 2022-04-27 DIAGNOSIS — F41.9 ANXIETY DURING PREGNANCY IN SECOND TRIMESTER, ANTEPARTUM: ICD-10-CM

## 2022-04-27 DIAGNOSIS — O99.322 DRUG USE AFFECTING PREGNANCY IN SECOND TRIMESTER: ICD-10-CM

## 2022-04-27 DIAGNOSIS — O09.93 HIGH-RISK PREGNANCY IN THIRD TRIMESTER: ICD-10-CM

## 2022-04-27 LAB
ALBUMIN SERPL-MCNC: 3.6 G/DL (ref 3.5–5.2)
ALBUMIN/GLOB SERPL: 1.2 G/DL
ALP SERPL-CCNC: 141 U/L (ref 39–117)
ALT SERPL W P-5'-P-CCNC: 8 U/L (ref 1–33)
ANION GAP SERPL CALCULATED.3IONS-SCNC: 12 MMOL/L (ref 5–15)
AST SERPL-CCNC: 12 U/L (ref 1–32)
BACTERIA UR QL AUTO: ABNORMAL /HPF
BASOPHILS # BLD AUTO: 0.04 10*3/MM3 (ref 0–0.2)
BASOPHILS NFR BLD AUTO: 0.4 % (ref 0–1.5)
BILIRUB SERPL-MCNC: 0.2 MG/DL (ref 0–1.2)
BILIRUB UR QL STRIP: NEGATIVE
BUN SERPL-MCNC: 4 MG/DL (ref 6–20)
BUN/CREAT SERPL: 6.2 (ref 7–25)
CALCIUM SPEC-SCNC: 8.8 MG/DL (ref 8.6–10.5)
CHLORIDE SERPL-SCNC: 106 MMOL/L (ref 98–107)
CLARITY UR: ABNORMAL
CO2 SERPL-SCNC: 18 MMOL/L (ref 22–29)
COD CRY URNS QL: ABNORMAL /HPF
COLOR UR: ABNORMAL
CREAT SERPL-MCNC: 0.65 MG/DL (ref 0.57–1)
DEPRECATED RDW RBC AUTO: 40.7 FL (ref 37–54)
EGFRCR SERPLBLD CKD-EPI 2021: 128.6 ML/MIN/1.73
EOSINOPHIL # BLD AUTO: 0.77 10*3/MM3 (ref 0–0.4)
EOSINOPHIL NFR BLD AUTO: 7.9 % (ref 0.3–6.2)
ERYTHROCYTE [DISTWIDTH] IN BLOOD BY AUTOMATED COUNT: 13.5 % (ref 12.3–15.4)
GLOBULIN UR ELPH-MCNC: 3.1 GM/DL
GLUCOSE SERPL-MCNC: 83 MG/DL (ref 65–99)
GLUCOSE UR STRIP-MCNC: NEGATIVE MG/DL
HCT VFR BLD AUTO: 33.1 % (ref 34–46.6)
HGB BLD-MCNC: 11.8 G/DL (ref 12–15.9)
HGB UR QL STRIP.AUTO: NEGATIVE
HYALINE CASTS UR QL AUTO: ABNORMAL /LPF
HYPOCHROMIA BLD QL: NORMAL
IMM GRANULOCYTES # BLD AUTO: 0.09 10*3/MM3 (ref 0–0.05)
IMM GRANULOCYTES NFR BLD AUTO: 0.9 % (ref 0–0.5)
KETONES UR QL STRIP: ABNORMAL
LEUKOCYTE ESTERASE UR QL STRIP.AUTO: ABNORMAL
LYMPHOCYTES # BLD AUTO: 2.43 10*3/MM3 (ref 0.7–3.1)
LYMPHOCYTES NFR BLD AUTO: 24.8 % (ref 19.6–45.3)
MCH RBC QN AUTO: 29.6 PG (ref 26.6–33)
MCHC RBC AUTO-ENTMCNC: 35.6 G/DL (ref 31.5–35.7)
MCV RBC AUTO: 83.2 FL (ref 79–97)
MONOCYTES # BLD AUTO: 0.64 10*3/MM3 (ref 0.1–0.9)
MONOCYTES NFR BLD AUTO: 6.5 % (ref 5–12)
MUCOUS THREADS URNS QL MICRO: ABNORMAL /HPF
NEUTROPHILS NFR BLD AUTO: 5.82 10*3/MM3 (ref 1.7–7)
NEUTROPHILS NFR BLD AUTO: 59.5 % (ref 42.7–76)
NITRITE UR QL STRIP: NEGATIVE
NRBC BLD AUTO-RTO: 0 /100 WBC (ref 0–0.2)
PH UR STRIP.AUTO: 6 [PH] (ref 5–9)
PLAT MORPH BLD: NORMAL
PLATELET # BLD AUTO: 317 10*3/MM3 (ref 140–450)
PMV BLD AUTO: 10.9 FL (ref 6–12)
POTASSIUM SERPL-SCNC: 3.5 MMOL/L (ref 3.5–5.2)
PROT SERPL-MCNC: 6.7 G/DL (ref 6–8.5)
PROT UR QL STRIP: ABNORMAL
RBC # BLD AUTO: 3.98 10*6/MM3 (ref 3.77–5.28)
RBC # UR STRIP: ABNORMAL /HPF
REF LAB TEST METHOD: ABNORMAL
SODIUM SERPL-SCNC: 136 MMOL/L (ref 136–145)
SP GR UR STRIP: 1.03 (ref 1–1.03)
SQUAMOUS #/AREA URNS HPF: ABNORMAL /HPF
UROBILINOGEN UR QL STRIP: ABNORMAL
WBC # UR STRIP: ABNORMAL /HPF
WBC MORPH BLD: NORMAL
WBC NRBC COR # BLD: 9.79 10*3/MM3 (ref 3.4–10.8)
WHOLE BLOOD HOLD SPECIMEN: NORMAL

## 2022-04-27 PROCEDURE — 84156 ASSAY OF PROTEIN URINE: CPT | Performed by: OBSTETRICS & GYNECOLOGY

## 2022-04-27 PROCEDURE — 59025 FETAL NON-STRESS TEST: CPT

## 2022-04-27 PROCEDURE — 36415 COLL VENOUS BLD VENIPUNCTURE: CPT | Performed by: OBSTETRICS & GYNECOLOGY

## 2022-04-27 PROCEDURE — 81001 URINALYSIS AUTO W/SCOPE: CPT | Performed by: OBSTETRICS & GYNECOLOGY

## 2022-04-27 PROCEDURE — 71260 CT THORAX DX C+: CPT

## 2022-04-27 PROCEDURE — 82570 ASSAY OF URINE CREATININE: CPT | Performed by: OBSTETRICS & GYNECOLOGY

## 2022-04-27 PROCEDURE — 85025 COMPLETE CBC W/AUTO DIFF WBC: CPT | Performed by: OBSTETRICS & GYNECOLOGY

## 2022-04-27 PROCEDURE — 76819 FETAL BIOPHYS PROFIL W/O NST: CPT

## 2022-04-27 PROCEDURE — 85007 BL SMEAR W/DIFF WBC COUNT: CPT | Performed by: OBSTETRICS & GYNECOLOGY

## 2022-04-27 PROCEDURE — 80053 COMPREHEN METABOLIC PANEL: CPT | Performed by: OBSTETRICS & GYNECOLOGY

## 2022-04-27 PROCEDURE — 0 IOPAMIDOL PER 1 ML: Performed by: OBSTETRICS & GYNECOLOGY

## 2022-04-27 PROCEDURE — G0463 HOSPITAL OUTPT CLINIC VISIT: HCPCS

## 2022-04-27 RX ORDER — ACETAMINOPHEN 500 MG
1000 TABLET ORAL ONCE
Status: COMPLETED | OUTPATIENT
Start: 2022-04-27 | End: 2022-04-27

## 2022-04-27 RX ORDER — ACETAMINOPHEN 500 MG
TABLET ORAL
Status: COMPLETED
Start: 2022-04-27 | End: 2022-04-27

## 2022-04-27 RX ADMIN — Medication 1000 MG: at 15:48

## 2022-04-27 RX ADMIN — ACETAMINOPHEN 1000 MG: 500 TABLET, FILM COATED ORAL at 15:48

## 2022-04-27 RX ADMIN — IOPAMIDOL 63 ML: 755 INJECTION, SOLUTION INTRAVENOUS at 16:54

## 2022-04-27 NOTE — OUTREACH NOTE
"Motherhood Connection  Check-In    Questions/Answers    Flowsheet Row Responses   Best Method for Contacting Cell   Demographics Reviewed Yes   Currently Employed No   Able to keep appointments as scheduled Yes   Gender(s) and Name(s) Girl \"Jessica\"   Baby Active/Feeling Fetal Movemen Yes   How are you presently feeling? She's very active, feeling well, just ready for her to come.   Questions regarding prenatal visits or tests to be ordered? Yes   Questions Asked if DTAP vaccine was needed- encouraged her to ask for it next OB visit.   Education related to new diagnoses/home equipment No   May I ask you questions about your substance use? Yes   Other Comment denies change in use from previous visit   Special Considerations Birthing Ball   Supplies ready for baby Breast Pump, Car Seat, Clothing, Diapers, Crib, Feeding Supplies   Resource/Environmental Concerns None   Do you have any questions related to your care experience, your pregnancy, plans for delivery, any concerns, etc? No          Gave telephone number for [facility] for patient to contact regarding the healthy baby program.    Verna Williamson RN  Maternity Nurse Navigator    4/27/2022, 12:11 CDT        "

## 2022-04-28 LAB
CREAT UR-MCNC: 288.2 MG/DL
PROT ?TM UR-MCNC: 22.4 MG/DL
PROT/CREAT UR: 77.7 MG/G CREA (ref 0–200)

## 2022-04-29 ENCOUNTER — ROUTINE PRENATAL (OUTPATIENT)
Dept: OBSTETRICS AND GYNECOLOGY | Facility: CLINIC | Age: 22
End: 2022-04-29

## 2022-04-29 VITALS — DIASTOLIC BLOOD PRESSURE: 64 MMHG | SYSTOLIC BLOOD PRESSURE: 120 MMHG | WEIGHT: 204 LBS | BODY MASS INDEX: 35.02 KG/M2

## 2022-04-29 DIAGNOSIS — F41.9 ANXIETY DURING PREGNANCY IN SECOND TRIMESTER, ANTEPARTUM: ICD-10-CM

## 2022-04-29 DIAGNOSIS — O99.342 DEPRESSION DURING PREGNANCY IN SECOND TRIMESTER: ICD-10-CM

## 2022-04-29 DIAGNOSIS — Z36.85 ANTENATAL SCREENING FOR STREPTOCOCCUS B: ICD-10-CM

## 2022-04-29 DIAGNOSIS — O09.93 HIGH-RISK PREGNANCY IN THIRD TRIMESTER: Primary | ICD-10-CM

## 2022-04-29 DIAGNOSIS — O99.212 OBESITY AFFECTING PREGNANCY IN SECOND TRIMESTER: ICD-10-CM

## 2022-04-29 DIAGNOSIS — O99.322 DRUG USE AFFECTING PREGNANCY IN SECOND TRIMESTER: ICD-10-CM

## 2022-04-29 DIAGNOSIS — O99.332 MATERNAL TOBACCO USE IN SECOND TRIMESTER: ICD-10-CM

## 2022-04-29 DIAGNOSIS — O99.342 ANXIETY DURING PREGNANCY IN SECOND TRIMESTER, ANTEPARTUM: ICD-10-CM

## 2022-04-29 DIAGNOSIS — F32.A DEPRESSION DURING PREGNANCY IN SECOND TRIMESTER: ICD-10-CM

## 2022-04-29 DIAGNOSIS — F12.10 MARIJUANA ABUSE: ICD-10-CM

## 2022-04-29 PROCEDURE — 99214 OFFICE O/P EST MOD 30 MIN: CPT | Performed by: NURSE PRACTITIONER

## 2022-04-29 PROCEDURE — 87653 STREP B DNA AMP PROBE: CPT | Performed by: NURSE PRACTITIONER

## 2022-04-29 PROCEDURE — 90471 IMMUNIZATION ADMIN: CPT | Performed by: NURSE PRACTITIONER

## 2022-04-29 PROCEDURE — 90715 TDAP VACCINE 7 YRS/> IM: CPT | Performed by: NURSE PRACTITIONER

## 2022-04-29 RX ORDER — ONDANSETRON 8 MG/1
TABLET, ORALLY DISINTEGRATING ORAL
COMMUNITY
Start: 2022-04-12 | End: 2022-06-21

## 2022-04-29 NOTE — PROGRESS NOTES
"CC: Prenatal visit    Breann Baltazar is a 22 y.o.  at 36w5d. Pt hs multiple concerns, primarily is concern if she is developing Pre-Eclampsia. Pt was seen int he ER on  with complaints of SOB. Had one elevated B/P of 141/73. Pt reports that yesterday she had similar symptoms of what caused her to go the ER; pt states her face felt \"droopy\", was drooling, felt she couldn't move, was lightheaded and felt a short out breath again. Thought it was her blood sugar so she ate a candy and felt better, but symptoms occurred a few more times yesterday. Pt today c/o of migraine at the back of her head and a throbbing sensation in her back. Felt lightheaded and had some blurry vision this morning but this was before she ate anything. Took 2 extra strength tylenol this morning. Now, rates migraine is about 5/10 pain, not the worse she has felt. Declines additional medication for this migraine today. Blood pressure is 120/64.  Pt voices that she is worried that she will develop Pre-E because her Vanessa had it and had a stillborn. Pt states she lives 2 hours away from here and it is difficult to come in to be evaluated every time she does not feel well. She also has a 3 year old step-child she cares for. Voices that she may call the ambulance to take her to [place] she feels she needs to be seen.  Denies contractions, LOF, or VB.  Reports good FM.    /64   Wt 92.5 kg (204 lb)   LMP  (LMP Unknown)   BMI 35.02 kg/m²   SVE: Deferred  Fundal Height (cm): 36 cm  Fetal Heart Rate: 130     Problems (from 21 to present)     Problem Noted Resolved    High-risk pregnancy in third trimester 10/14/2021 by Magaly Ferrer, APRN No    Overview Signed 3/17/2022 12:25 PM by Lacey Santizo APRN     Baby girl \"Gerda Santiago\"  BC?  Breast/bottle?           Maternal tobacco use in second trimester 10/14/2021 by Magaly Ferrer, APRN No    Overview Signed 10/14/2021 10:59 AM by Magaly Ferrer, APRN      ppd down " to 5 cig/day at time of NOB visit           Marijuana abuse 10/14/2021 by Magaly Ferrer, JUAN No    Drug use affecting pregnancy in second trimester 10/14/2021 by Magaly Ferrer, JUAN No    Obesity affecting pregnancy in second trimester 10/14/2021 by Magaly Ferrer APRN No    Overview Addendum 3/17/2022 12:00 PM by Lacey Santizo APRN     Passed 3 hr glucola, no GDM.            Previous Version    Depression during pregnancy in second trimester 10/14/2021 by Magaly Ferrer APRN No    Overview Signed 10/14/2021 10:59 AM by Magaly Ferrer APRN     10/14: EDPS- 5           Anxiety during pregnancy in second trimester, antepartum 10/14/2021 by Magaly Ferrer APRN No    Overview Signed 10/14/2021 10:59 AM by Magaly Ferrer APRN     10/14: EDPS- 5           Primigravida in first trimester 10/14/2021 by Magaly Ferrer APRN 2021 by Regina Moreira APRN          A/P: Breann Baltazar is a 22 y.o.  at 36w5d.  - Provided reassurance; discussed  Pre-E labwork a few days ago was normal. Defer additional labs today as blood pressure is WNL. Not in acute distress. Declined additional medication for migraine today. Reviewed Pre-E, PTL, and fetal kick count precautions. Pt appeared to more reassured after discussing at length about Pre-E. Discussed purchasing a blood pressure machine cuff if patient desires to monitor B/Ps at home, but reiterated B/Ps overall have been normal this pregnancy. Encouraged to increase water intake, eat protein over candy if she feels her blood sugar is low.   - TDAP given today; pt requested as she stepped on a nail the other day.   - Declined elective IOL at 39 weeks  - GBS collected  - RTC in 1 weeks  - Reviewed COVID-19 visitation policy  - Reviewed COVID-19 precautions     Diagnosis Plan   1. High-risk pregnancy in third trimester     2. Maternal tobacco use in second trimester     3. Marijuana abuse     4. Anxiety during pregnancy in second trimester,  antepartum     5. Depression during pregnancy in second trimester     6. Obesity affecting pregnancy in second trimester     7. Drug use affecting pregnancy in second trimester     8.  screening for streptococcus B  Group B Strep (Molecular) - Swab, Vaginal/Rectum     Lacey Huerta, APRN  2022  12:36 CDT

## 2022-04-30 LAB — GROUP B STREP, DNA: POSITIVE

## 2022-05-04 DIAGNOSIS — J45.40 MODERATE PERSISTENT ASTHMA WITHOUT COMPLICATION: ICD-10-CM

## 2022-05-05 ENCOUNTER — TELEPHONE (OUTPATIENT)
Dept: OBSTETRICS AND GYNECOLOGY | Facility: CLINIC | Age: 22
End: 2022-05-05

## 2022-05-05 ENCOUNTER — HOSPITAL ENCOUNTER (OUTPATIENT)
Facility: HOSPITAL | Age: 22
Discharge: HOME | End: 2022-05-05
Attending: OBSTETRICS & GYNECOLOGY | Admitting: OBSTETRICS & GYNECOLOGY

## 2022-05-05 ENCOUNTER — PATIENT OUTREACH (OUTPATIENT)
Dept: LABOR AND DELIVERY | Facility: HOSPITAL | Age: 22
End: 2022-05-05

## 2022-05-05 VITALS
DIASTOLIC BLOOD PRESSURE: 57 MMHG | OXYGEN SATURATION: 98 % | TEMPERATURE: 98.5 F | RESPIRATION RATE: 18 BRPM | HEART RATE: 87 BPM | SYSTOLIC BLOOD PRESSURE: 125 MMHG

## 2022-05-05 LAB
A1 MICROGLOB PLACENTAL VAG QL: NEGATIVE
BACTERIA UR QL AUTO: ABNORMAL /HPF
BILIRUB UR QL STRIP: NEGATIVE
CLARITY UR: CLEAR
COLOR UR: YELLOW
GLUCOSE UR STRIP-MCNC: NEGATIVE MG/DL
HGB UR QL STRIP.AUTO: NEGATIVE
HYALINE CASTS UR QL AUTO: ABNORMAL /LPF
KETONES UR QL STRIP: NEGATIVE
LEUKOCYTE ESTERASE UR QL STRIP.AUTO: ABNORMAL
NITRITE UR QL STRIP: NEGATIVE
PH UR STRIP.AUTO: 7 [PH] (ref 5–9)
PROT UR QL STRIP: NEGATIVE
RBC # UR STRIP: ABNORMAL /HPF
REF LAB TEST METHOD: ABNORMAL
SP GR UR STRIP: 1.02 (ref 1–1.03)
SQUAMOUS #/AREA URNS HPF: ABNORMAL /HPF
UROBILINOGEN UR QL STRIP: ABNORMAL
WBC # UR STRIP: ABNORMAL /HPF

## 2022-05-05 PROCEDURE — 59025 FETAL NON-STRESS TEST: CPT

## 2022-05-05 PROCEDURE — 81001 URINALYSIS AUTO W/SCOPE: CPT | Performed by: OBSTETRICS & GYNECOLOGY

## 2022-05-05 PROCEDURE — 59025 FETAL NON-STRESS TEST: CPT | Performed by: OBSTETRICS & GYNECOLOGY

## 2022-05-05 PROCEDURE — 84112 EVAL AMNIOTIC FLUID PROTEIN: CPT | Performed by: OBSTETRICS & GYNECOLOGY

## 2022-05-05 PROCEDURE — G0463 HOSPITAL OUTPT CLINIC VISIT: HCPCS

## 2022-05-05 NOTE — TELEPHONE ENCOUNTER
Patient called the office and states she ate a honey bun this morning and it made her very sick and has been in pain ever since.  She denies and vaginal bleeding or decreased fetal movement but states she has been leaking fluid.  I encouraged patient to keep up coming appointment in the office but she states that she has no form of transportation.  Patient says that when her baby's father returns home she plans to call ambulance to be transported to labor and delivery.

## 2022-05-05 NOTE — OUTREACH NOTE
[facility]  Check-In    Questions/Answers    Flowsheet Row Responses   Best Method for Contacting Home   Demographics Reviewed Yes   Currently Employed No   Able to keep appointments as scheduled No  [car recently broke down]   Questions regarding prenatal visits or tests to be ordered? Yes          Please see details of conversation below.    Verna Williamson, RN  Maternity Nurse Navigator    5/5/2022, 14:42 CDT                Patient reached out for support stating she had eaten a honey bun early this morning and felt very sick and nauseated ever since.  She also relates increased vaginal discharge and is not sure if this is amniotic fluid.  She relates backache and stomach cramps as well.  I let her know the only reliable way to be sure if she was leaking fluid was to be examined in person.  She verbalized understanding. She's got an appointment with an NP this afternoon at the [facility] [place]. She states that her car is not reliable and does not have transportation right now, she's planning to call an ambulance to be transported to labor and delivery to be evaluated.  She has also spoken to staff at the [facility].  I explained the PACS system and availability to her for future needs for medical appointment transportation.

## 2022-05-06 NOTE — PLAN OF CARE
Problem: Adult Inpatient Plan of Care  Goal: Plan of Care Review  Outcome: Met  Flowsheets (Taken 5/5/2022 2041)  Progress: improving  Plan of Care Reviewed With: patient  Outcome Evaluation: VSS, amniosure results negative, cervical exam was 0-1 dilation, no contractions noted, NST reactive, discharged home  Goal: Patient-Specific Goal (Individualized)  Outcome: Met  Goal: Absence of Hospital-Acquired Illness or Injury  Outcome: Met  Goal: Optimal Comfort and Wellbeing  Outcome: Met  Goal: Readiness for Transition of Care  Outcome: Met  Intervention: Mutually Develop Transition Plan  Recent Flowsheet Documentation  Taken 5/5/2022 2029 by Jessica Payne, RN  Equipment Needed After Discharge: none  Equipment Currently Used at Home: none  Anticipated Changes Related to Illness: none  Transportation Anticipated: family or friend will provide  Transportation Concerns: none  Concerns to be Addressed: no discharge needs identified  Readmission Within the Last 30 Days: no previous admission in last 30 days  Patient/Family Anticipated Services at Transition: none  Patient/Family Anticipates Transition to: home   Goal Outcome Evaluation:  Plan of Care Reviewed With: patient        Progress: improving  Outcome Evaluation: VSS, amniosure results negative, cervical exam was 0-1 dilation, no contractions noted, NST reactive, discharged home

## 2022-05-06 NOTE — DISCHARGE INSTR - ACTIVITY
Return to hospital for...  Uterine contractions that are 2-3 minutes apart and do not go away with change of activity.  Vaginal bleeding.  Leaking of fluid or your water breaks.  Decreased fetal movement.    Take all medications as prescribed and keep your follow up appointment with your provider.

## 2022-05-06 NOTE — NON STRESS TEST
Breann Baltazar, a  at 37w4d with an CARRILLO of 2022, by Ultrasound, was seen at [facility] for a nonstress test.    Chief Complaint   Patient presents with   • Contractions     States she feels cramps       Patient Active Problem List   Diagnosis   • Chronic midline low back pain   • Sore throat   • Disorder of bone   • Mild intermittent asthma   • High-risk pregnancy in third trimester   • Maternal tobacco use in second trimester   • Marijuana abuse   • Drug use affecting pregnancy in second trimester   • Obesity affecting pregnancy in second trimester   • Depression during pregnancy in second trimester   • Anxiety during pregnancy in second trimester, antepartum       Start Time:   Stop Time:     Interpretation A  Nonstress Test Interpretation A: Reactive (22 : Jessica Payne, RN)  Comments A: reviewed with WILLOW Boone RN (22 : Jessica Payne, RN)

## 2022-05-09 ENCOUNTER — TELEPHONE (OUTPATIENT)
Dept: OBSTETRICS AND GYNECOLOGY | Facility: CLINIC | Age: 22
End: 2022-05-09

## 2022-05-09 NOTE — TELEPHONE ENCOUNTER
PATIENT CALLED AND STATED THAT ONE OF THE APRN'S TOLD HER SHE NEEDED TO BE DELIVERED THIS WEEK AND WANTED TO KNOW IF SHE COULD GET AN APPOINTMENT TO BE DELIVERED. I SPOKE WITH DR. SEGURA AND HE WANTS TO SEE HER BEFORE HE MAKES HER AN APPOINTMENT. I LET THE PATIENT KNOW AND SHE SAID SHE WILL CALL BACK. SHE HAS TO CALL HER SISTER TO SEE IF SHE CAN GET A RIDE.

## 2022-05-10 ENCOUNTER — TELEPHONE (OUTPATIENT)
Dept: OBSTETRICS AND GYNECOLOGY | Facility: CLINIC | Age: 22
End: 2022-05-10

## 2022-05-10 NOTE — TELEPHONE ENCOUNTER
Ob patient called and said talked with someone yesterday and they told her to be seen today to get set up for induction,ask who she sees or talked to ,she got rude and said she didn't know,we had her records.I offered her to see Dr Stephen which was the last dr she seen for tomorrow,becasue he wasn't in today/neither was Alexis for the rest of the week,she said no that I was being rude and she did not want to talk to me anymore and I told her that I was just trying to help her and she said no,that she was coming in today at 1pm and seeing a doctor to be induced..So I called the nurse she had talked to yesterday  Shilpa haile..Talked to Magalie and she said it doesn't work that way and tell patient that they would call her back later today..Note from yesturday /SHILPA telephone call is in chart..So told patient [facility] nurse would call her back..

## 2022-05-15 ENCOUNTER — HOSPITAL ENCOUNTER (OUTPATIENT)
Facility: HOSPITAL | Age: 22
Discharge: HOME | End: 2022-05-15
Attending: OBSTETRICS & GYNECOLOGY | Admitting: OBSTETRICS & GYNECOLOGY

## 2022-05-15 PROCEDURE — 59025 FETAL NON-STRESS TEST: CPT

## 2022-05-15 PROCEDURE — G0463 HOSPITAL OUTPT CLINIC VISIT: HCPCS

## 2022-05-15 NOTE — NON STRESS TEST
"  Breann Baltazar, a  at 39w0d with an CARRILLO of 2022, by Ultrasound, was seen at [facility] for a nonstress test.    Chief Complaint   Patient presents with   • Rupture of Membranes     Approximately 11am       Patient Active Problem List   Diagnosis   • Chronic midline low back pain   • Sore throat   • Disorder of bone   • Mild intermittent asthma   • High-risk pregnancy in third trimester   • Maternal tobacco use in second trimester   • Marijuana abuse   • Drug use affecting pregnancy in second trimester   • Obesity affecting pregnancy in second trimester   • Depression during pregnancy in second trimester   • Anxiety during pregnancy in second trimester, antepartum       Pt presents via EMS with c/o ROM at home around 11am, states she did not have personal transportation here.  States +FM, denies any consistent contractions, notes only occasional \"natalie grajeda\". No distress noted.   Assessment notes negative nitrazine swab.  No pooling or fluid noted with exam.    Start Time: 1240  Stop Time: 1300    Interpretation A  Nonstress Test Interpretation A: Reactive (05/15/22 1300 : Gloria Bean, RN)  Comments A: Reactive NST, reviewed with CHARAN France RN (05/15/22 1300 : Gloria Bean, RN)        "

## 2022-05-15 NOTE — DISCHARGE INSTR - ACTIVITY
Activity as tolerated.  Call Dr Espinoza office on Monday morning for follow up on plan of care.   Return for contractions that are consistent every 3-4 minutes, if you think your water broke, decreased fetal movement, temperature greater than 100.4, or any other problems.   Make sure to drink plenty of water to stay hydrated.

## 2022-05-23 ENCOUNTER — ROUTINE PRENATAL (OUTPATIENT)
Dept: OBSTETRICS AND GYNECOLOGY | Facility: CLINIC | Age: 22
End: 2022-05-23

## 2022-05-23 VITALS — BODY MASS INDEX: 35.7 KG/M2 | WEIGHT: 208 LBS | DIASTOLIC BLOOD PRESSURE: 70 MMHG | SYSTOLIC BLOOD PRESSURE: 110 MMHG

## 2022-05-23 DIAGNOSIS — O99.332 MATERNAL TOBACCO USE IN SECOND TRIMESTER: ICD-10-CM

## 2022-05-23 DIAGNOSIS — F32.A DEPRESSION DURING PREGNANCY IN SECOND TRIMESTER: ICD-10-CM

## 2022-05-23 DIAGNOSIS — O99.322 DRUG USE AFFECTING PREGNANCY IN SECOND TRIMESTER: ICD-10-CM

## 2022-05-23 DIAGNOSIS — F41.9 ANXIETY DURING PREGNANCY IN SECOND TRIMESTER, ANTEPARTUM: ICD-10-CM

## 2022-05-23 DIAGNOSIS — Z34.90 ENCOUNTER FOR ELECTIVE INDUCTION OF LABOR: ICD-10-CM

## 2022-05-23 DIAGNOSIS — O99.342 ANXIETY DURING PREGNANCY IN SECOND TRIMESTER, ANTEPARTUM: ICD-10-CM

## 2022-05-23 DIAGNOSIS — O09.30 HISTORY OF INADEQUATE PRENATAL CARE: ICD-10-CM

## 2022-05-23 DIAGNOSIS — O99.342 DEPRESSION DURING PREGNANCY IN SECOND TRIMESTER: ICD-10-CM

## 2022-05-23 DIAGNOSIS — F12.10 MARIJUANA ABUSE: ICD-10-CM

## 2022-05-23 DIAGNOSIS — O09.93 HIGH-RISK PREGNANCY IN THIRD TRIMESTER: Primary | ICD-10-CM

## 2022-05-23 DIAGNOSIS — Q78.6 MULTIPLE HEREDITARY EXOSTOSES: ICD-10-CM

## 2022-05-23 DIAGNOSIS — O99.212 OBESITY AFFECTING PREGNANCY IN SECOND TRIMESTER: ICD-10-CM

## 2022-05-23 PROCEDURE — 99214 OFFICE O/P EST MOD 30 MIN: CPT | Performed by: STUDENT IN AN ORGANIZED HEALTH CARE EDUCATION/TRAINING PROGRAM

## 2022-05-23 NOTE — PROGRESS NOTES
"CC: Prenatal visit    Breann Baltazar is a 22 y.o.  at 40w1d.  Doing well.  Denies contractions, LOF, or VB.  Reports good FM. Inadequate PNC. Has [facility] 3 appts with last clinic visit 22 at 36+5. States problems was ride availability. Desires IOL but not until Wed or Thurs as wants to wait a couple of more days for spontaneous labor.    /70   Wt 94.3 kg (208 lb)   LMP  (LMP Unknown)   BMI 35.70 kg/m²   SVE: deferred        NST Review  130's, +accels, no decels, mod variability, no ctx on toco  Did require repositioning for accelerations.  Modified BPP with >2x2 cm pocket of fluid, DVP 6.63 cm    A/P: Breann Baltazar is a 22 y.o.  at 40w1d.  - IOL   - GBS +   - Reviewed COVID-19 visitation policy  - Reviewed COVID-19 precautions    Problem List Items Addressed This Visit        Gravid and     High-risk pregnancy in third trimester - Primary    Overview     Baby girl \"Gerda Santiago\"  BC?  Breast/bottle?           Drug use affecting pregnancy in second trimester    Obesity affecting pregnancy in second trimester    Overview     Passed 3 hr glucola, no GDM.            Depression during pregnancy in second trimester    Overview     10/14: EDPS- 5           Anxiety during pregnancy in second trimester, antepartum    Overview     10/14: EDPS- 5              Mental Health    Marijuana abuse       Tobacco    Maternal tobacco use in second trimester    Overview     1/2 ppd down to 5 cig/day at time of NOB visit             Other Visit Diagnoses     Encounter for elective induction of labor        History of inadequate prenatal care        Multiple hereditary exostoses                 Diagnosis Plan   1. High-risk pregnancy in third trimester     2. Maternal tobacco use in second trimester     3. Marijuana abuse     4. Anxiety during pregnancy in second trimester, antepartum     5. Depression during pregnancy in second trimester     6. Obesity affecting pregnancy in " second trimester     7. Drug use affecting pregnancy in second trimester     8. Encounter for elective induction of labor     9. History of inadequate prenatal care     10. Multiple hereditary exostoses       Becky Lew DO  5/24/2022  00:24 CDT

## 2022-05-24 RX ORDER — BUTORPHANOL TARTRATE 1 MG/ML
2 INJECTION, SOLUTION INTRAMUSCULAR; INTRAVENOUS
Status: CANCELLED | OUTPATIENT
Start: 2022-05-23

## 2022-05-24 RX ORDER — LIDOCAINE HYDROCHLORIDE 10 MG/ML
5 INJECTION, SOLUTION EPIDURAL; INFILTRATION; INTRACAUDAL; PERINEURAL AS NEEDED
Status: CANCELLED | OUTPATIENT
Start: 2022-05-23

## 2022-05-24 RX ORDER — OXYTOCIN 10 [USP'U]/ML
650 INJECTION, SOLUTION INTRAMUSCULAR; INTRAVENOUS ONCE
Status: CANCELLED | OUTPATIENT
Start: 2022-05-24

## 2022-05-24 RX ORDER — ACETAMINOPHEN 325 MG/1
1000 TABLET ORAL EVERY 6 HOURS
Status: CANCELLED | OUTPATIENT
Start: 2022-05-24

## 2022-05-24 RX ORDER — OXYTOCIN 10 [USP'U]/ML
85 INJECTION, SOLUTION INTRAMUSCULAR; INTRAVENOUS ONCE
Status: CANCELLED | OUTPATIENT
Start: 2022-05-24

## 2022-05-24 RX ORDER — MISOPROSTOL 100 UG/1
800 TABLET ORAL AS NEEDED
Status: CANCELLED | OUTPATIENT
Start: 2022-05-23

## 2022-05-24 RX ORDER — CARBOPROST TROMETHAMINE 250 UG/ML
250 INJECTION, SOLUTION INTRAMUSCULAR AS NEEDED
Status: CANCELLED | OUTPATIENT
Start: 2022-05-23

## 2022-05-24 RX ORDER — SODIUM CHLORIDE 0.9 % (FLUSH) 0.9 %
3 SYRINGE (ML) INJECTION EVERY 12 HOURS SCHEDULED
Status: CANCELLED | OUTPATIENT
Start: 2022-05-24

## 2022-05-24 RX ORDER — ONDANSETRON 2 MG/ML
4 INJECTION INTRAMUSCULAR; INTRAVENOUS EVERY 6 HOURS PRN
Status: CANCELLED | OUTPATIENT
Start: 2022-05-23

## 2022-05-24 RX ORDER — PROMETHAZINE HYDROCHLORIDE 25 MG/1
25 TABLET ORAL EVERY 6 HOURS PRN
Status: CANCELLED | OUTPATIENT
Start: 2022-05-23

## 2022-05-24 RX ORDER — OXYTOCIN 10 [USP'U]/ML
2-20 INJECTION, SOLUTION INTRAMUSCULAR; INTRAVENOUS
Status: CANCELLED | OUTPATIENT
Start: 2022-05-24

## 2022-05-24 RX ORDER — BUTORPHANOL TARTRATE 1 MG/ML
1 INJECTION, SOLUTION INTRAMUSCULAR; INTRAVENOUS
Status: CANCELLED | OUTPATIENT
Start: 2022-05-23

## 2022-05-24 RX ORDER — SODIUM CHLORIDE, SODIUM LACTATE, POTASSIUM CHLORIDE, CALCIUM CHLORIDE 600; 310; 30; 20 MG/100ML; MG/100ML; MG/100ML; MG/100ML
125 INJECTION, SOLUTION INTRAVENOUS CONTINUOUS
Status: CANCELLED | OUTPATIENT
Start: 2022-05-24

## 2022-05-24 RX ORDER — PROMETHAZINE HYDROCHLORIDE 25 MG/1
12.5 SUPPOSITORY RECTAL EVERY 6 HOURS PRN
Status: CANCELLED | OUTPATIENT
Start: 2022-05-23

## 2022-05-24 RX ORDER — METHYLERGONOVINE MALEATE 0.2 MG/ML
200 INJECTION INTRAVENOUS ONCE AS NEEDED
Status: CANCELLED | OUTPATIENT
Start: 2022-05-23

## 2022-05-24 RX ORDER — SODIUM CHLORIDE 0.9 % (FLUSH) 0.9 %
3-10 SYRINGE (ML) INJECTION AS NEEDED
Status: CANCELLED | OUTPATIENT
Start: 2022-05-23

## 2022-05-24 RX ORDER — ONDANSETRON 4 MG/1
4 TABLET, FILM COATED ORAL EVERY 6 HOURS PRN
Status: CANCELLED | OUTPATIENT
Start: 2022-05-23

## 2022-05-24 RX ORDER — IBUPROFEN 200 MG
800 TABLET ORAL EVERY 8 HOURS
Status: CANCELLED | OUTPATIENT
Start: 2022-05-24

## 2022-05-24 NOTE — H&P
HISTORY & PHYSICAL - Obstetrics  [facility]    Name: Breann Baltazar  MRN: [id number]  Location: Room/bed info not found  Date: 2022  CSN: [id number]      CHIEF COMPLAINT:  Elective induction of labor, approaching post dates    HISTORY OF PRESENT ILLNESS  Breann Baltazar is a 22 y.o.  at 40w2d gestational age by 8 week 1st TRI US suggesting Estimated Date of Delivery: 22.  The patient presents to clinic for routine PNC, desires to be scheduled for IOL.  Denies LOF.  Denies vaginal bleeding.  Denies contractions.  Feeling good FM.    Inadequate PNC. Has [facility] 3 appts with last clinic visit 22 at 36+5. States problems was ride availability.    Patient denies any chest pain, palpitations, headaches, lightheadedness, shortness of breath, cough, nausea, vomiting, diarrhea, constipation, fever, or chills.    ROS  Review of Systems   Constitutional: Negative.    HENT: Negative.    Respiratory: Negative.    Cardiovascular: Negative.    Gastrointestinal: Negative.    Genitourinary: Negative.    Musculoskeletal: Negative.    Skin: Negative.    Psychiatric/Behavioral: Negative.        PRENATAL LAB RESULTS  Prenatal labs reviewed  External Prenatal Results     Pregnancy Outside Results - Transcribed From Office Records - See Scanned Records For Details     Test Value Date Time    ABO  O  21 1102    Rh  Positive  21 1102    Antibody Screen  Negative  21 1102    Varicella IgG       Rubella  1.92 index 21 1102    Hgb  11.8 g/dL 22 1453       11.7 g/dL 22 1021       13.1 g/dL 21 1102    Hct  33.1 % 22 1453       34.5 % 22 1021       38.6 % 21 1102    Glucose Fasting GTT       Glucose Tolerance Test 1 hour       Glucose Tolerance Test 3 hour  94 mg/dL 22 1229       79 mg/dL 22 1140    Gonorrhea (discrete)  Negative  21 1102    Chlamydia (discrete)  Negative  21 1102    RPR  Non-Reactive  21 1102     "VDRL       Syphilis Antibody       HBsAg  Non-Reactive  09/27/21 1102    Herpes Simplex Virus PCR       Herpes Simplex VIrus Culture       HIV  Non-Reactive  09/27/21 1102    Hep C RNA Quant PCR       Hep C Antibody  Non-Reactive  09/27/21 1102    AFP       Group B Strep  Positive  04/29/22 1128    GBS Susceptibility to Clindamycin       GBS Susceptibility to Erythromycin       Fetal Fibronectin       Genetic Testing, Maternal Blood             Drug Screening     Test Value Date Time    Urine Drug Screen       Amphetamine Screen  Negative  01/27/22 1130       Negative  09/27/21 1102    Barbiturate Screen  Negative  01/27/22 1130       Negative  09/27/21 1102    Benzodiazepine Screen  Negative  01/27/22 1130       Negative  09/27/21 1102    Methadone Screen  Negative  01/27/22 1130       Negative  09/27/21 1102    Phencyclidine Screen  Negative  01/27/22 1130       Negative  09/27/21 1102    Opiates Screen  Negative  01/27/22 1130       Negative  09/27/21 1102    THC Screen  Positive  01/27/22 1130       Positive  09/27/21 1102    Cocaine Screen       Propoxyphene Screen  Negative  01/27/22 1130       Negative  09/27/21 1102    Buprenorphine Screen  Negative  01/27/22 1130       Negative  09/27/21 1102    Methamphetamine Screen       Oxycodone Screen  Negative  01/27/22 1130       Negative  09/27/21 1102    Tricyclic Antidepressants Screen  Negative  01/27/22 1130       Negative  09/27/21 1102          Legend    ^: Historical                        PRENATAL RISK FACTORS  9/21 Problems (from 09/27/21 to present)     Problem Noted Resolved    High-risk pregnancy in third trimester 10/14/2021 by Magaly Ferrer APRN No    Overview Signed 3/17/2022 12:25 PM by Lacey Santizo APRN     Baby girl \"Gerda Santiago\"  BC?  Breast/bottle?           Maternal tobacco use in second trimester 10/14/2021 by Magaly Ferrer APRN No    Overview Signed 10/14/2021 10:59 AM by Magaly Ferrer APRN     1/2 ppd down to 5 cig/day at " time of NOB visit           Marijuana abuse 10/14/2021 by Magaly Ferrer, JUAN No    Drug use affecting pregnancy in second trimester 10/14/2021 by Magaly Ferrer APRN No    Obesity affecting pregnancy in second trimester 10/14/2021 by Magaly Ferrer APRN No    Overview Addendum 3/17/2022 12:00 PM by Lacey Santizo APRN     Passed 3 hr glucola, no GDM.            Previous Version    Depression during pregnancy in second trimester 10/14/2021 by Magaly Ferrer APRN No    Overview Signed 10/14/2021 10:59 AM by Magaly Ferrer APRN     10/14: EDPS- 5           Anxiety during pregnancy in second trimester, antepartum 10/14/2021 by Magaly Ferrer APRN No    Overview Signed 10/14/2021 10:59 AM by Magaly Ferrer APRN     10/14: EDPS- 5           Primigravida in first trimester 10/14/2021 by Magaly Ferrer APRN 2021 by Regina Moreira APRN          DATING SUMMARY  LMP (unk) -- CARRILLO ?  1TUS (10/14/21 at 8w4d) -- CARRILLO 22    OB HISTORY  OB History    Para Term  AB Living   1             SAB IAB Ectopic Molar Multiple Live Births                    # Outcome Date GA Lbr Fredis/2nd Weight Sex Delivery Anes PTL Lv   1 Current              GYN HISTORY  Denies h/o sexually transmitted infections/pelvic inflammatory disease  Denies h/o abnormal pap smears, last pap was needs PP pap  Denies h/o gynecologic surgeries, including biopsies of the cervix    PAST MEDICAL HISTORY  Past Medical History:   Diagnosis Date   • Allergic    • Anxiety    • Asthma    • Depression    • Multiple hereditary exostoses    • Smoker      PAST SURGICAL HISTORY  Past Surgical History:   Procedure Laterality Date   • FEMUR OSTEOCHONDROMA Right    • KNEE SURGERY Bilateral     both knees    • WISDOM TOOTH EXTRACTION       FAMILY HISTORY  Family History   Problem Relation Age of Onset   • COPD Mother    • Asthma Mother    • Diabetes Father    • Hypertension Father    • Cirrhosis Father    • Heart attack Paternal  Grandfather      SOCIAL HISTORY  Social History     Socioeconomic History   • Marital status: Single   Tobacco Use   • Smoking status: Current Every Day Smoker     Packs/day: 0.50     Years: 2.00     Pack years: 1.00     Types: Cigarettes   • Smokeless tobacco: Never Used   Vaping Use   • Vaping Use: Never used   Substance and Sexual Activity   • Alcohol use: Not Currently   • Drug use: No   • Sexual activity: Yes     Partners: Male     Comment: Pt states she is 5 months pregnant     ALLERGIES  No Known Allergies  HOME MEDICATIONS  Prior to Admission medications    Medication Sig Start Date End Date Taking? Authorizing Provider   metoclopramide (Reglan) 10 MG tablet Take 1 tablet by mouth 4 (Four) Times a Day. 10/14/21  Yes Magaly Ferrer APRN   ondansetron ODT (ZOFRAN-ODT) 8 MG disintegrating tablet PLACE 1 TABLET ON THE TONGUE EVERY 8 (EIGHT) HOURS AS NEEDED FOR NAUSEA OR VOMITING. 22  Yes Provider, MD Vu   prenatal vitamin (prenatal, CLASSIC, vitamin) tablet Take  by mouth Daily.   Yes Provider, MD Vu   ProAir  (90 Base) MCG/ACT inhaler INHALE 2 PUFFS EERY 4 HOURS AS NEEDED FOR WHEEZING 22  Yes Magaly Ferrer APRN     PHYSICAL EXAM  /70   Wt 94.3 kg (208 lb)   LMP  (LMP Unknown)   BMI 35.70 kg/m²   General: No acute distress. Well developed, well nourished. Pleasant.  Heart: Regular rate and rhythm  Lungs: Clear to auscultation bilaterally.   Abdomen: Soft, nontender to palpation, enlarged by gravid uterus.    NST Review  130's, +accels, no decels, mod variability, no ctx on toco  Did require repositioning for accelerations.  Modified BPP with >2x2 cm pocket of fluid, DVP 6.63 cm    SVE: defer to admission    IMPRESSION  Breann Baltazar is a 22 y.o.  at 40w1d admitted with plan for elective IOL for approaching post dates, desires to wait until Wednesday.    PLAN  1.  IUP at 40w1d with plan for admission at 40+3 for IOL for approaching postdates  - Strict labor  precautions until admission  - Admit: Labor and Delivery  - Attending: Dr. Lew  - Condition: Stable  - Vitals: per protocol  - Activity: ad leona  - Nursing: Continuous electronic fetal monitoring, as per protocol  - Diet: Clears  - IV fluids:  mL/hr  - Meds: prn tylenol, stadol, zofran  - Allergies: nkda  - Labs: CBC, T&S, UDS  - GBS: positive.  Antibiotics:  PCN  - Breann Baltazar and I have discussed pain goals for this hospitalization after reviewing her current clinical condition, medical history and prior pain experiences.  The goal is to keep her pain level managemable.  Will discuss on admission whether patient does desire an epidural  - Anticipate , will check cervix on admission for planned IOL    2.  Hereditary Multiple Exostoses  - Patient reports lesion in back, causes back pain  - Would recommend anesthesia evaluation on admission to discuss epidural and whether will be affected; XR spine 3/14/19 with no visible exostosis in chart  - AD inheritance, plan to alert peds        This document has been electronically signed by Becky Lew DO on May 24, 2022 00:02 CDT

## 2022-05-25 ENCOUNTER — HOSPITAL ENCOUNTER (OUTPATIENT)
Dept: LABOR AND DELIVERY | Facility: HOSPITAL | Age: 22
Discharge: HOME | End: 2022-05-25

## 2022-05-25 ENCOUNTER — ANESTHESIA EVENT (OUTPATIENT)
Dept: LABOR AND DELIVERY | Facility: HOSPITAL | Age: 22
End: 2022-05-25

## 2022-05-25 ENCOUNTER — ANESTHESIA (OUTPATIENT)
Dept: LABOR AND DELIVERY | Facility: HOSPITAL | Age: 22
End: 2022-05-25

## 2022-05-25 ENCOUNTER — HOSPITAL ENCOUNTER (INPATIENT)
Facility: HOSPITAL | Age: 22
LOS: 2 days | Discharge: HOME | End: 2022-05-27
Attending: STUDENT IN AN ORGANIZED HEALTH CARE EDUCATION/TRAINING PROGRAM | Admitting: STUDENT IN AN ORGANIZED HEALTH CARE EDUCATION/TRAINING PROGRAM

## 2022-05-25 ENCOUNTER — PATIENT OUTREACH (OUTPATIENT)
Dept: LABOR AND DELIVERY | Facility: HOSPITAL | Age: 22
End: 2022-05-25

## 2022-05-25 DIAGNOSIS — O09.93 HIGH-RISK PREGNANCY IN THIRD TRIMESTER: ICD-10-CM

## 2022-05-25 DIAGNOSIS — Z34.90 ENCOUNTER FOR ELECTIVE INDUCTION OF LABOR: ICD-10-CM

## 2022-05-25 PROBLEM — Z91.199 NONCOMPLIANCE: Status: ACTIVE | Noted: 2022-05-25

## 2022-05-25 PROBLEM — O09.30 HISTORY OF INADEQUATE PRENATAL CARE: Status: ACTIVE | Noted: 2022-05-25

## 2022-05-25 PROBLEM — Z3A.40 40 WEEKS GESTATION OF PREGNANCY: Status: ACTIVE | Noted: 2022-05-25

## 2022-05-25 PROBLEM — O99.323 DRUG USE AFFECTING PREGNANCY IN THIRD TRIMESTER: Status: ACTIVE | Noted: 2022-05-25

## 2022-05-25 PROBLEM — O99.210 OBESITY IN PREGNANCY, ANTEPARTUM: Status: ACTIVE | Noted: 2022-05-25

## 2022-05-25 LAB
ABO GROUP BLD: NORMAL
AMPHET+METHAMPHET UR QL: NEGATIVE
AMPHETAMINES UR QL: NEGATIVE
BARBITURATES UR QL SCN: NEGATIVE
BENZODIAZ UR QL SCN: NEGATIVE
BLD GP AB SCN SERPL QL: NEGATIVE
BUPRENORPHINE SERPL-MCNC: NEGATIVE NG/ML
CANNABINOIDS SERPL QL: NEGATIVE
COCAINE UR QL: NEGATIVE
DEPRECATED RDW RBC AUTO: 40.7 FL (ref 37–54)
ERYTHROCYTE [DISTWIDTH] IN BLOOD BY AUTOMATED COUNT: 13.7 % (ref 12.3–15.4)
FLUAV RNA RESP QL NAA+PROBE: NOT DETECTED
FLUBV RNA RESP QL NAA+PROBE: NOT DETECTED
HCT VFR BLD AUTO: 33.9 % (ref 34–46.6)
HGB BLD-MCNC: 12.1 G/DL (ref 12–15.9)
HOLD SPECIMEN: NORMAL
Lab: NORMAL
MCH RBC QN AUTO: 29.5 PG (ref 26.6–33)
MCHC RBC AUTO-ENTMCNC: 35.7 G/DL (ref 31.5–35.7)
MCV RBC AUTO: 82.7 FL (ref 79–97)
METHADONE UR QL SCN: NEGATIVE
OPIATES UR QL: NEGATIVE
OXYCODONE UR QL SCN: NEGATIVE
PCP UR QL SCN: NEGATIVE
PLATELET # BLD AUTO: 312 10*3/MM3 (ref 140–450)
PMV BLD AUTO: 11.2 FL (ref 6–12)
PROPOXYPH UR QL: NEGATIVE
RBC # BLD AUTO: 4.1 10*6/MM3 (ref 3.77–5.28)
RH BLD: POSITIVE
SARS-COV-2 RNA RESP QL NAA+PROBE: NOT DETECTED
T&S EXPIRATION DATE: NORMAL
TRICYCLICS UR QL SCN: NEGATIVE
WBC NRBC COR # BLD: 12.71 10*3/MM3 (ref 3.4–10.8)

## 2022-05-25 PROCEDURE — 86901 BLOOD TYPING SEROLOGIC RH(D): CPT | Performed by: STUDENT IN AN ORGANIZED HEALTH CARE EDUCATION/TRAINING PROGRAM

## 2022-05-25 PROCEDURE — 51703 INSERT BLADDER CATH COMPLEX: CPT

## 2022-05-25 PROCEDURE — 86850 RBC ANTIBODY SCREEN: CPT | Performed by: STUDENT IN AN ORGANIZED HEALTH CARE EDUCATION/TRAINING PROGRAM

## 2022-05-25 PROCEDURE — 3E033VJ INTRODUCTION OF OTHER HORMONE INTO PERIPHERAL VEIN, PERCUTANEOUS APPROACH: ICD-10-PCS | Performed by: OBSTETRICS & GYNECOLOGY

## 2022-05-25 PROCEDURE — 86900 BLOOD TYPING SEROLOGIC ABO: CPT | Performed by: STUDENT IN AN ORGANIZED HEALTH CARE EDUCATION/TRAINING PROGRAM

## 2022-05-25 PROCEDURE — 63710000001 PROMETHAZINE PER 25 MG: Performed by: STUDENT IN AN ORGANIZED HEALTH CARE EDUCATION/TRAINING PROGRAM

## 2022-05-25 PROCEDURE — 25010000002 BUTORPHANOL PER 1 MG: Performed by: STUDENT IN AN ORGANIZED HEALTH CARE EDUCATION/TRAINING PROGRAM

## 2022-05-25 PROCEDURE — C1755 CATHETER, INTRASPINAL: HCPCS | Performed by: NURSE ANESTHETIST, CERTIFIED REGISTERED

## 2022-05-25 PROCEDURE — 0 LIDOCAINE 1 % SOLUTION: Performed by: NURSE ANESTHETIST, CERTIFIED REGISTERED

## 2022-05-25 PROCEDURE — 80306 DRUG TEST PRSMV INSTRMNT: CPT | Performed by: STUDENT IN AN ORGANIZED HEALTH CARE EDUCATION/TRAINING PROGRAM

## 2022-05-25 PROCEDURE — 0UQMXZZ REPAIR VULVA, EXTERNAL APPROACH: ICD-10-PCS | Performed by: OBSTETRICS & GYNECOLOGY

## 2022-05-25 PROCEDURE — C1755 CATHETER, INTRASPINAL: HCPCS

## 2022-05-25 PROCEDURE — 0 PENICILLIN G POTASSIUM PER 600000 UNITS: Performed by: STUDENT IN AN ORGANIZED HEALTH CARE EDUCATION/TRAINING PROGRAM

## 2022-05-25 PROCEDURE — 94799 UNLISTED PULMONARY SVC/PX: CPT

## 2022-05-25 PROCEDURE — 10907ZC DRAINAGE OF AMNIOTIC FLUID, THERAPEUTIC FROM PRODUCTS OF CONCEPTION, VIA NATURAL OR ARTIFICIAL OPENING: ICD-10-PCS | Performed by: OBSTETRICS & GYNECOLOGY

## 2022-05-25 PROCEDURE — 3E0DXGC INTRODUCTION OF OTHER THERAPEUTIC SUBSTANCE INTO MOUTH AND PHARYNX, EXTERNAL APPROACH: ICD-10-PCS | Performed by: OBSTETRICS & GYNECOLOGY

## 2022-05-25 PROCEDURE — 87636 SARSCOV2 & INF A&B AMP PRB: CPT | Performed by: STUDENT IN AN ORGANIZED HEALTH CARE EDUCATION/TRAINING PROGRAM

## 2022-05-25 PROCEDURE — 59410 OBSTETRICAL CARE: CPT | Performed by: OBSTETRICS & GYNECOLOGY

## 2022-05-25 PROCEDURE — 85027 COMPLETE CBC AUTOMATED: CPT | Performed by: STUDENT IN AN ORGANIZED HEALTH CARE EDUCATION/TRAINING PROGRAM

## 2022-05-25 PROCEDURE — 25010000002 FENTANYL CITRATE (PF) 100 MCG/2ML SOLUTION: Performed by: NURSE ANESTHETIST, CERTIFIED REGISTERED

## 2022-05-25 RX ORDER — PROMETHAZINE HYDROCHLORIDE 12.5 MG/1
12.5 SUPPOSITORY RECTAL EVERY 6 HOURS PRN
Status: DISCONTINUED | OUTPATIENT
Start: 2022-05-25 | End: 2022-05-26 | Stop reason: HOSPADM

## 2022-05-25 RX ORDER — SODIUM CHLORIDE, SODIUM LACTATE, POTASSIUM CHLORIDE, CALCIUM CHLORIDE 600; 310; 30; 20 MG/100ML; MG/100ML; MG/100ML; MG/100ML
INJECTION, SOLUTION INTRAVENOUS
Status: DISPENSED
Start: 2022-05-25 | End: 2022-05-25

## 2022-05-25 RX ORDER — ONDANSETRON 2 MG/ML
4 INJECTION INTRAMUSCULAR; INTRAVENOUS EVERY 6 HOURS PRN
Status: DISCONTINUED | OUTPATIENT
Start: 2022-05-25 | End: 2022-05-26 | Stop reason: HOSPADM

## 2022-05-25 RX ORDER — OXYTOCIN/0.9 % SODIUM CHLORIDE 30/500 ML
2-20 PLASTIC BAG, INJECTION (ML) INTRAVENOUS
Status: DISCONTINUED | OUTPATIENT
Start: 2022-05-25 | End: 2022-05-26 | Stop reason: HOSPADM

## 2022-05-25 RX ORDER — METHYLERGONOVINE MALEATE 0.2 MG/ML
200 INJECTION INTRAVENOUS ONCE AS NEEDED
Status: DISCONTINUED | OUTPATIENT
Start: 2022-05-25 | End: 2022-05-26 | Stop reason: HOSPADM

## 2022-05-25 RX ORDER — LIDOCAINE HYDROCHLORIDE 10 MG/ML
5 INJECTION, SOLUTION EPIDURAL; INFILTRATION; INTRACAUDAL; PERINEURAL AS NEEDED
Status: DISCONTINUED | OUTPATIENT
Start: 2022-05-25 | End: 2022-05-26 | Stop reason: HOSPADM

## 2022-05-25 RX ORDER — OXYTOCIN/0.9 % SODIUM CHLORIDE 30/500 ML
650 PLASTIC BAG, INJECTION (ML) INTRAVENOUS ONCE
Status: DISCONTINUED | OUTPATIENT
Start: 2022-05-25 | End: 2022-05-26 | Stop reason: HOSPADM

## 2022-05-25 RX ORDER — ONDANSETRON 4 MG/1
4 TABLET, FILM COATED ORAL EVERY 6 HOURS PRN
Status: DISCONTINUED | OUTPATIENT
Start: 2022-05-25 | End: 2022-05-26 | Stop reason: HOSPADM

## 2022-05-25 RX ORDER — CARBOPROST TROMETHAMINE 250 UG/ML
250 INJECTION, SOLUTION INTRAMUSCULAR AS NEEDED
Status: DISCONTINUED | OUTPATIENT
Start: 2022-05-25 | End: 2022-05-26 | Stop reason: HOSPADM

## 2022-05-25 RX ORDER — SODIUM CHLORIDE 0.9 % (FLUSH) 0.9 %
3 SYRINGE (ML) INJECTION EVERY 12 HOURS SCHEDULED
Status: DISCONTINUED | OUTPATIENT
Start: 2022-05-25 | End: 2022-05-26 | Stop reason: HOSPADM

## 2022-05-25 RX ORDER — NICOTINE 21 MG/24HR
1 PATCH, TRANSDERMAL 24 HOURS TRANSDERMAL
Status: DISCONTINUED | OUTPATIENT
Start: 2022-05-25 | End: 2022-05-27 | Stop reason: HOSPADM

## 2022-05-25 RX ORDER — SODIUM CHLORIDE, SODIUM LACTATE, POTASSIUM CHLORIDE, CALCIUM CHLORIDE 600; 310; 30; 20 MG/100ML; MG/100ML; MG/100ML; MG/100ML
125 INJECTION, SOLUTION INTRAVENOUS CONTINUOUS
Status: DISCONTINUED | OUTPATIENT
Start: 2022-05-25 | End: 2022-05-27 | Stop reason: HOSPADM

## 2022-05-25 RX ORDER — LIDOCAINE HYDROCHLORIDE 10 MG/ML
INJECTION, SOLUTION INFILTRATION; PERINEURAL AS NEEDED
Status: DISCONTINUED | OUTPATIENT
Start: 2022-05-25 | End: 2022-05-25 | Stop reason: SURG

## 2022-05-25 RX ORDER — MISOPROSTOL 200 UG/1
800 TABLET ORAL AS NEEDED
Status: DISCONTINUED | OUTPATIENT
Start: 2022-05-25 | End: 2022-05-26 | Stop reason: HOSPADM

## 2022-05-25 RX ORDER — PROMETHAZINE HYDROCHLORIDE 25 MG/1
25 TABLET ORAL EVERY 6 HOURS PRN
Status: DISCONTINUED | OUTPATIENT
Start: 2022-05-25 | End: 2022-05-26 | Stop reason: HOSPADM

## 2022-05-25 RX ORDER — SODIUM CHLORIDE 0.9 % (FLUSH) 0.9 %
3-10 SYRINGE (ML) INJECTION AS NEEDED
Status: DISCONTINUED | OUTPATIENT
Start: 2022-05-25 | End: 2022-05-26 | Stop reason: HOSPADM

## 2022-05-25 RX ORDER — FENTANYL CITRATE 50 UG/ML
INJECTION, SOLUTION INTRAMUSCULAR; INTRAVENOUS AS NEEDED
Status: DISCONTINUED | OUTPATIENT
Start: 2022-05-25 | End: 2022-05-25 | Stop reason: SURG

## 2022-05-25 RX ORDER — IBUPROFEN 800 MG/1
800 TABLET ORAL EVERY 8 HOURS
Status: DISCONTINUED | OUTPATIENT
Start: 2022-05-25 | End: 2022-05-26 | Stop reason: HOSPADM

## 2022-05-25 RX ORDER — ACETAMINOPHEN 500 MG
1000 TABLET ORAL EVERY 6 HOURS
Status: DISCONTINUED | OUTPATIENT
Start: 2022-05-25 | End: 2022-05-26 | Stop reason: HOSPADM

## 2022-05-25 RX ORDER — BUTORPHANOL TARTRATE 1 MG/ML
1 INJECTION, SOLUTION INTRAMUSCULAR; INTRAVENOUS
Status: DISCONTINUED | OUTPATIENT
Start: 2022-05-25 | End: 2022-05-26 | Stop reason: HOSPADM

## 2022-05-25 RX ORDER — BUPIVACAINE HYDROCHLORIDE 2.5 MG/ML
INJECTION, SOLUTION EPIDURAL; INFILTRATION; INTRACAUDAL AS NEEDED
Status: DISCONTINUED | OUTPATIENT
Start: 2022-05-25 | End: 2022-05-25 | Stop reason: SURG

## 2022-05-25 RX ORDER — OXYTOCIN/0.9 % SODIUM CHLORIDE 30/500 ML
85 PLASTIC BAG, INJECTION (ML) INTRAVENOUS ONCE
Status: DISCONTINUED | OUTPATIENT
Start: 2022-05-25 | End: 2022-05-26 | Stop reason: HOSPADM

## 2022-05-25 RX ORDER — LIDOCAINE HYDROCHLORIDE AND EPINEPHRINE 15; 5 MG/ML; UG/ML
INJECTION, SOLUTION EPIDURAL AS NEEDED
Status: DISCONTINUED | OUTPATIENT
Start: 2022-05-25 | End: 2022-05-25 | Stop reason: SURG

## 2022-05-25 RX ADMIN — ACETAMINOPHEN 1000 MG: 500 TABLET, FILM COATED ORAL at 22:14

## 2022-05-25 RX ADMIN — PROMETHAZINE HYDROCHLORIDE 25 MG: 25 TABLET ORAL at 10:33

## 2022-05-25 RX ADMIN — BUPIVACAINE HYDROCHLORIDE 8 ML: 2.5 INJECTION, SOLUTION EPIDURAL; INFILTRATION; INTRACAUDAL; PERINEURAL at 13:10

## 2022-05-25 RX ADMIN — SODIUM CHLORIDE 3 MILLION UNITS: 9 INJECTION, SOLUTION INTRAVENOUS at 11:55

## 2022-05-25 RX ADMIN — FENTANYL CITRATE 100 MCG: 50 INJECTION, SOLUTION INTRAMUSCULAR; INTRAVENOUS at 16:37

## 2022-05-25 RX ADMIN — BUPIVACAINE HYDROCHLORIDE 6 ML: 2.5 INJECTION, SOLUTION EPIDURAL; INFILTRATION; INTRACAUDAL; PERINEURAL at 16:37

## 2022-05-25 RX ADMIN — NICOTINE 1 PATCH: 14 PATCH, EXTENDED RELEASE TRANSDERMAL at 08:27

## 2022-05-25 RX ADMIN — IBUPROFEN 800 MG: 800 TABLET, FILM COATED ORAL at 23:26

## 2022-05-25 RX ADMIN — SODIUM CHLORIDE, POTASSIUM CHLORIDE, SODIUM LACTATE AND CALCIUM CHLORIDE 125 ML/HR: 600; 310; 30; 20 INJECTION, SOLUTION INTRAVENOUS at 11:56

## 2022-05-25 RX ADMIN — BUTORPHANOL TARTRATE 2 MG: 2 INJECTION, SOLUTION INTRAMUSCULAR; INTRAVENOUS at 10:34

## 2022-05-25 RX ADMIN — SODIUM CHLORIDE, POTASSIUM CHLORIDE, SODIUM LACTATE AND CALCIUM CHLORIDE 125 ML/HR: 600; 310; 30; 20 INJECTION, SOLUTION INTRAVENOUS at 18:15

## 2022-05-25 RX ADMIN — SODIUM CHLORIDE 3 MILLION UNITS: 9 INJECTION, SOLUTION INTRAVENOUS at 15:21

## 2022-05-25 RX ADMIN — SODIUM CHLORIDE 3 MILLION UNITS: 9 INJECTION, SOLUTION INTRAVENOUS at 19:14

## 2022-05-25 RX ADMIN — LIDOCAINE HYDROCHLORIDE AND EPINEPHRINE 3 ML: 15; 5 INJECTION, SOLUTION EPIDURAL at 13:03

## 2022-05-25 RX ADMIN — LIDOCAINE HYDROCHLORIDE 5 ML: 10 INJECTION, SOLUTION INFILTRATION; PERINEURAL at 12:59

## 2022-05-25 RX ADMIN — OXYTOCIN-SODIUM CHLORIDE 0.9% IV SOLN 30 UNIT/500ML 2 MILLI-UNITS/MIN: 30-0.9/5 SOLUTION at 07:31

## 2022-05-25 RX ADMIN — SODIUM CHLORIDE 5 MILLION UNITS: 9 INJECTION, SOLUTION INTRAVENOUS at 07:36

## 2022-05-25 NOTE — ANESTHESIA PREPROCEDURE EVALUATION
Anesthesia Evaluation     Patient summary reviewed and Nursing notes reviewed   NPO Solid Status: > 8 hours  NPO Liquid Status: > 2 hours           Airway   Mallampati: II  TM distance: >3 FB  Neck ROM: full  No difficulty expected  Dental - normal exam     Pulmonary - normal exam   (+) a smoker Current Abstained day of surgery, asthma,  Cardiovascular - negative cardio ROS and normal exam        Neuro/Psych  (+) psychiatric history Depression and Anxiety,    GI/Hepatic/Renal/Endo    (+) obesity,       Musculoskeletal     (+) back pain, chronic pain, neck pain,       ROS comment: Disorder of bone  Abdominal   (+) obese,    Substance History   (+) drug use (hx Marijuana abuse)     OB/GYN    (+) Pregnant,         Other                        Anesthesia Plan    ASA 2     epidural       Anesthetic plan, all risks, benefits, and alternatives have been provided, discussed and informed consent has been obtained with: patient.        CODE STATUS:    Code Status (Patient has no pulse and is not breathing): CPR (Attempt to Resuscitate)  Medical Interventions (Patient has pulse or is breathing): Full

## 2022-05-25 NOTE — OUTREACH NOTE
[facility]  IP Antepartum    Current Estimated Gestational Age: 40w3d    Questions/Answers    Flowsheet Row Responses   Best Method for Contacting Cell   Support Person Present Yes   Community Resources/Benefits currently in use: Bonus Benefits, WIC   Understanding of diagnosis/reason for admission: Patient understands, no questions or concerns at this time   Follow-up with patient in: 06/02/22  [Postpartum home call]          Spoke to Breann.  She just recently got her epidural and is feeling much better, induction is progressing well and she is excited.  She says her care has been excellent.  Denied any additional needs, questions, or concerns at this time.  Will follow up at home with her by phone next week.    Verna Williamson, RN  Maternity Nurse Navigator    5/25/2022, 14:42 CDT

## 2022-05-25 NOTE — ANESTHESIA PROCEDURE NOTES
Labor Epidural      Patient reassessed immediately prior to procedure    Patient location during procedure: OB  Start Time: 5/25/2022 12:59 PM  Stop Time: 5/25/2022 1:03 PM  Indication:at surgeon's request  Performed By  CRNA/CINDY: Iron Singleton CRNA  Preanesthetic Checklist  Completed: patient identified, IV checked, site marked, risks and benefits discussed, surgical consent, monitors and equipment checked, pre-op evaluation and timeout performed  Prep:  Pt Position:sitting  Sterile Tech:cap, gloves, mask and sterile barrier  Prep:povidone-iodine 7.5% surgical scrub  Monitoring:blood pressure monitoring, continuous pulse oximetry and EKG  Epidural Block Procedure:  Approach:midline  Guidance:landmark technique  Location:L3-L4  Needle Type:Tuohy  Needle Gauge:17 G  Loss of Resistance Medium: saline  Loss of Resistance: 7cm  Cath Depth at skin:12 cm  Paresthesia: none  Aspiration:negative  Test Dose:negative  Number of Attempts: 1  Post Assessment:  Dressing:occlusive dressing applied and secured with tape  Pt Tolerance:patient tolerated the procedure well with no apparent complications  Complications:no

## 2022-05-26 LAB
BASOPHILS # BLD AUTO: 0.04 10*3/MM3 (ref 0–0.2)
BASOPHILS NFR BLD AUTO: 0.3 % (ref 0–1.5)
DEPRECATED RDW RBC AUTO: 42.1 FL (ref 37–54)
EOSINOPHIL # BLD AUTO: 0 10*3/MM3 (ref 0–0.4)
EOSINOPHIL NFR BLD AUTO: 0 % (ref 0.3–6.2)
ERYTHROCYTE [DISTWIDTH] IN BLOOD BY AUTOMATED COUNT: 14 % (ref 12.3–15.4)
HCT VFR BLD AUTO: 28.3 % (ref 34–46.6)
HGB BLD-MCNC: 9.9 G/DL (ref 12–15.9)
IMM GRANULOCYTES # BLD AUTO: 0.12 10*3/MM3 (ref 0–0.05)
IMM GRANULOCYTES NFR BLD AUTO: 0.8 % (ref 0–0.5)
LYMPHOCYTES # BLD AUTO: 2.83 10*3/MM3 (ref 0.7–3.1)
LYMPHOCYTES NFR BLD AUTO: 18.2 % (ref 19.6–45.3)
MCH RBC QN AUTO: 29 PG (ref 26.6–33)
MCHC RBC AUTO-ENTMCNC: 35 G/DL (ref 31.5–35.7)
MCV RBC AUTO: 83 FL (ref 79–97)
MONOCYTES # BLD AUTO: 1 10*3/MM3 (ref 0.1–0.9)
MONOCYTES NFR BLD AUTO: 6.4 % (ref 5–12)
NEUTROPHILS NFR BLD AUTO: 11.59 10*3/MM3 (ref 1.7–7)
NEUTROPHILS NFR BLD AUTO: 74.3 % (ref 42.7–76)
NRBC BLD AUTO-RTO: 0 /100 WBC (ref 0–0.2)
PLATELET # BLD AUTO: 265 10*3/MM3 (ref 140–450)
PMV BLD AUTO: 11.4 FL (ref 6–12)
RBC # BLD AUTO: 3.41 10*6/MM3 (ref 3.77–5.28)
WBC NRBC COR # BLD: 15.58 10*3/MM3 (ref 3.4–10.8)

## 2022-05-26 PROCEDURE — 0503F POSTPARTUM CARE VISIT: CPT | Performed by: OBSTETRICS & GYNECOLOGY

## 2022-05-26 PROCEDURE — 85025 COMPLETE CBC W/AUTO DIFF WBC: CPT | Performed by: OBSTETRICS & GYNECOLOGY

## 2022-05-26 RX ORDER — OXYTOCIN/0.9 % SODIUM CHLORIDE 30/500 ML
650 PLASTIC BAG, INJECTION (ML) INTRAVENOUS ONCE
Status: DISCONTINUED | OUTPATIENT
Start: 2022-05-26 | End: 2022-05-27 | Stop reason: HOSPADM

## 2022-05-26 RX ORDER — POLYETHYLENE GLYCOL 3350 17 G/17G
17 POWDER, FOR SOLUTION ORAL DAILY
Status: DISCONTINUED | OUTPATIENT
Start: 2022-05-26 | End: 2022-05-27 | Stop reason: HOSPADM

## 2022-05-26 RX ORDER — HYDROCODONE BITARTRATE AND ACETAMINOPHEN 7.5; 325 MG/1; MG/1
1 TABLET ORAL EVERY 4 HOURS PRN
Status: DISCONTINUED | OUTPATIENT
Start: 2022-05-26 | End: 2022-05-27 | Stop reason: HOSPADM

## 2022-05-26 RX ORDER — CARBOPROST TROMETHAMINE 250 UG/ML
250 INJECTION, SOLUTION INTRAMUSCULAR ONCE
Status: DISCONTINUED | OUTPATIENT
Start: 2022-05-26 | End: 2022-05-27 | Stop reason: HOSPADM

## 2022-05-26 RX ORDER — IBUPROFEN 800 MG/1
800 TABLET ORAL EVERY 8 HOURS PRN
Status: DISCONTINUED | OUTPATIENT
Start: 2022-05-26 | End: 2022-05-27 | Stop reason: HOSPADM

## 2022-05-26 RX ORDER — DOCUSATE SODIUM 100 MG/1
100 CAPSULE, LIQUID FILLED ORAL 2 TIMES DAILY
Status: DISCONTINUED | OUTPATIENT
Start: 2022-05-26 | End: 2022-05-27 | Stop reason: HOSPADM

## 2022-05-26 RX ORDER — DIPHENHYDRAMINE HCL 25 MG
25 CAPSULE ORAL NIGHTLY PRN
Status: DISCONTINUED | OUTPATIENT
Start: 2022-05-26 | End: 2022-05-27 | Stop reason: HOSPADM

## 2022-05-26 RX ORDER — PRENATAL VIT/IRON FUM/FOLIC AC 27MG-0.8MG
1 TABLET ORAL DAILY
Status: DISCONTINUED | OUTPATIENT
Start: 2022-05-26 | End: 2022-05-27 | Stop reason: HOSPADM

## 2022-05-26 RX ORDER — SODIUM CHLORIDE 0.9 % (FLUSH) 0.9 %
1-10 SYRINGE (ML) INJECTION AS NEEDED
Status: DISCONTINUED | OUTPATIENT
Start: 2022-05-26 | End: 2022-05-27 | Stop reason: HOSPADM

## 2022-05-26 RX ORDER — OXYTOCIN/0.9 % SODIUM CHLORIDE 30/500 ML
85 PLASTIC BAG, INJECTION (ML) INTRAVENOUS ONCE
Status: DISCONTINUED | OUTPATIENT
Start: 2022-05-26 | End: 2022-05-27 | Stop reason: HOSPADM

## 2022-05-26 RX ORDER — MISOPROSTOL 200 UG/1
600 TABLET ORAL ONCE
Status: DISCONTINUED | OUTPATIENT
Start: 2022-05-26 | End: 2022-05-27 | Stop reason: HOSPADM

## 2022-05-26 RX ORDER — BISACODYL 10 MG
10 SUPPOSITORY, RECTAL RECTAL DAILY PRN
Status: DISCONTINUED | OUTPATIENT
Start: 2022-05-26 | End: 2022-05-27 | Stop reason: HOSPADM

## 2022-05-26 RX ORDER — HYDROCORTISONE 25 MG/G
1 CREAM TOPICAL AS NEEDED
Status: DISCONTINUED | OUTPATIENT
Start: 2022-05-26 | End: 2022-05-27 | Stop reason: HOSPADM

## 2022-05-26 RX ADMIN — DOCUSATE SODIUM 100 MG: 100 CAPSULE, LIQUID FILLED ORAL at 21:18

## 2022-05-26 RX ADMIN — Medication: at 04:37

## 2022-05-26 RX ADMIN — HYDROCODONE BITARTRATE AND ACETAMINOPHEN 1 TABLET: 7.5; 325 TABLET ORAL at 19:09

## 2022-05-26 RX ADMIN — IBUPROFEN 800 MG: 800 TABLET, FILM COATED ORAL at 16:19

## 2022-05-26 RX ADMIN — Medication 1 TABLET: at 09:13

## 2022-05-26 RX ADMIN — POLYETHYLENE GLYCOL 3350 17 G: 17 POWDER, FOR SOLUTION ORAL at 16:16

## 2022-05-26 RX ADMIN — NICOTINE 1 PATCH: 14 PATCH, EXTENDED RELEASE TRANSDERMAL at 09:15

## 2022-05-26 RX ADMIN — DOCUSATE SODIUM 100 MG: 100 CAPSULE, LIQUID FILLED ORAL at 09:14

## 2022-05-26 NOTE — ANESTHESIA POSTPROCEDURE EVALUATION
Patient: Breann Baltazar    Procedure Summary     Date: 05/25/22 Room / Location:     Anesthesia Start: 1252 Anesthesia Stop: 2146    Procedure: LABOR ANALGESIA Diagnosis:     Scheduled Providers:  Provider: Iron Singleton CRNA    Anesthesia Type: epidural ASA Status: 2          Anesthesia Type: epidural    Vitals  Vitals Value Taken Time   /64 05/25/22 2201   Temp 98.4 °F (36.9 °C) 05/25/22 2102   Pulse 86 05/25/22 2201   Resp 18 05/25/22 1946   SpO2 99 % 05/25/22 1934   Vitals shown include unvalidated device data.        Post Anesthesia Care and Evaluation    Patient location during evaluation: PACU  Patient participation: complete - patient participated  Level of consciousness: awake and awake and alert  Pain score: 0  Pain management: satisfactory to patient  Airway patency: patent  Anesthetic complications: No anesthetic complications  PONV Status: none  Cardiovascular status: acceptable and stable  Respiratory status: acceptable, room air and spontaneous ventilation  Hydration status: acceptable  Post Neuraxial Block status: Motor and sensory function returned to baseline and No signs or symptoms of PDPH

## 2022-05-27 VITALS
SYSTOLIC BLOOD PRESSURE: 98 MMHG | RESPIRATION RATE: 18 BRPM | DIASTOLIC BLOOD PRESSURE: 53 MMHG | OXYGEN SATURATION: 96 % | HEART RATE: 76 BPM | WEIGHT: 208 LBS | BODY MASS INDEX: 35.7 KG/M2 | TEMPERATURE: 98.3 F

## 2022-05-27 RX ORDER — IBUPROFEN 800 MG/1
800 TABLET ORAL EVERY 8 HOURS PRN
Qty: 90 TABLET | Refills: 2 | Status: SHIPPED | OUTPATIENT
Start: 2022-05-27

## 2022-05-27 RX ORDER — NICOTINE 21 MG/24HR
1 PATCH, TRANSDERMAL 24 HOURS TRANSDERMAL
Qty: 7 EACH | Refills: 3 | Status: SHIPPED | OUTPATIENT
Start: 2022-05-27 | End: 2022-06-21

## 2022-05-27 RX ADMIN — DOCUSATE SODIUM 100 MG: 100 CAPSULE, LIQUID FILLED ORAL at 08:56

## 2022-05-27 RX ADMIN — Medication 1 TABLET: at 08:56

## 2022-05-27 RX ADMIN — POLYETHYLENE GLYCOL 3350 17 G: 17 POWDER, FOR SOLUTION ORAL at 08:56

## 2022-06-02 ENCOUNTER — PATIENT OUTREACH (OUTPATIENT)
Dept: LABOR AND DELIVERY | Facility: HOSPITAL | Age: 22
End: 2022-06-02

## 2022-06-02 NOTE — OUTREACH NOTE
[facility]  Unable to Reach       Questions/Answers    Flowsheet Row Responses   Pending Outreach Postpartum Check-in   Call Attempt First   Outcome No answer/busy   Unable to reach comments: will attempt additional phone number            Verna Williamson RN  Maternity Nurse Navigator    6/2/2022, 10:04 CDT

## 2022-06-02 NOTE — OUTREACH NOTE
[facility]  Unable to Reach       Questions/Answers    Flowsheet Row Responses   Pending Outreach Postpartum Check-in   Call Attempt Second   Outcome Left message   Next Call Attempt Date 06/08/22   Unable to reach comments: Will await patient return call.              Verna Williamson, RN  Maternity Nurse Navigator    6/2/2022, 10:05 CDT

## 2022-06-08 ENCOUNTER — PATIENT OUTREACH (OUTPATIENT)
Dept: LABOR AND DELIVERY | Facility: HOSPITAL | Age: 22
End: 2022-06-08

## 2022-06-08 DIAGNOSIS — O09.93 HIGH-RISK PREGNANCY IN THIRD TRIMESTER: ICD-10-CM

## 2022-06-08 DIAGNOSIS — Z91.199 NONCOMPLIANCE: Primary | ICD-10-CM

## 2022-06-09 DIAGNOSIS — O09.93 HIGH-RISK PREGNANCY IN THIRD TRIMESTER: ICD-10-CM

## 2022-06-09 DIAGNOSIS — Z91.199 NONCOMPLIANCE: ICD-10-CM

## 2022-06-21 ENCOUNTER — POSTPARTUM VISIT (OUTPATIENT)
Dept: OBSTETRICS AND GYNECOLOGY | Facility: CLINIC | Age: 22
End: 2022-06-21

## 2022-06-21 VITALS
SYSTOLIC BLOOD PRESSURE: 114 MMHG | WEIGHT: 202.4 LBS | DIASTOLIC BLOOD PRESSURE: 66 MMHG | BODY MASS INDEX: 34.56 KG/M2 | HEIGHT: 64 IN

## 2022-06-21 PROCEDURE — 0503F POSTPARTUM CARE VISIT: CPT | Performed by: STUDENT IN AN ORGANIZED HEALTH CARE EDUCATION/TRAINING PROGRAM

## 2022-06-21 RX ORDER — FERROUS SULFATE 325(65) MG
325 TABLET ORAL
Qty: 30 TABLET | Refills: 1 | Status: SHIPPED | OUTPATIENT
Start: 2022-06-21

## 2022-12-01 RX ORDER — ALBUTEROL SULFATE 90 UG/1
2 AEROSOL, METERED RESPIRATORY (INHALATION) EVERY 4 HOURS PRN
Qty: 18 G | Refills: 3 | Status: SHIPPED | OUTPATIENT
Start: 2022-12-01

## 2023-06-08 ENCOUNTER — TRANSCRIBE ORDERS (OUTPATIENT)
Dept: ADMINISTRATIVE | Facility: HOSPITAL | Age: 23
End: 2023-06-08
Payer: COMMERCIAL

## 2023-06-08 DIAGNOSIS — Q78.6 EXOSTOSIS, HEREDITARY MULTIPLE: Primary | ICD-10-CM

## 2023-12-27 ENCOUNTER — INITIAL PRENATAL (OUTPATIENT)
Dept: OBSTETRICS AND GYNECOLOGY | Facility: CLINIC | Age: 23
End: 2023-12-27
Payer: COMMERCIAL

## 2023-12-27 VITALS — BODY MASS INDEX: 36.73 KG/M2 | WEIGHT: 214 LBS | SYSTOLIC BLOOD PRESSURE: 110 MMHG | DIASTOLIC BLOOD PRESSURE: 72 MMHG

## 2023-12-27 DIAGNOSIS — Z36.0 ENCOUNTER FOR ANTENATAL SCREENING FOR CHROMOSOMAL ANOMALIES: ICD-10-CM

## 2023-12-27 DIAGNOSIS — Z3A.14 14 WEEKS GESTATION OF PREGNANCY: ICD-10-CM

## 2023-12-27 DIAGNOSIS — Z34.82 MULTIGRAVIDA IN SECOND TRIMESTER: ICD-10-CM

## 2023-12-27 DIAGNOSIS — Z36.3 SCREENING, ANTENATAL, FOR MALFORMATION BY ULTRASOUND: ICD-10-CM

## 2023-12-27 DIAGNOSIS — O36.80X0 ENCOUNTER TO DETERMINE FETAL VIABILITY OF PREGNANCY, SINGLE OR UNSPECIFIED FETUS: Primary | ICD-10-CM

## 2023-12-27 DIAGNOSIS — O21.9 NAUSEA AND VOMITING IN PREGNANCY: ICD-10-CM

## 2023-12-27 DIAGNOSIS — Z13.71 TESTING OF FEMALE FOR GENETIC DISEASE CARRIER STATUS: ICD-10-CM

## 2023-12-27 DIAGNOSIS — Z71.85 IMMUNIZATION COUNSELING: ICD-10-CM

## 2023-12-27 PROBLEM — O99.342 ANXIETY DURING PREGNANCY IN SECOND TRIMESTER, ANTEPARTUM: Status: RESOLVED | Noted: 2021-10-14 | Resolved: 2023-12-27

## 2023-12-27 PROBLEM — F32.A DEPRESSION DURING PREGNANCY IN SECOND TRIMESTER: Status: RESOLVED | Noted: 2021-10-14 | Resolved: 2023-12-27

## 2023-12-27 PROBLEM — O09.93 HIGH-RISK PREGNANCY IN THIRD TRIMESTER: Status: RESOLVED | Noted: 2021-10-14 | Resolved: 2023-12-27

## 2023-12-27 PROBLEM — F41.9 ANXIETY DURING PREGNANCY IN SECOND TRIMESTER, ANTEPARTUM: Status: RESOLVED | Noted: 2021-10-14 | Resolved: 2023-12-27

## 2023-12-27 PROBLEM — Z34.90 ENCOUNTER FOR ELECTIVE INDUCTION OF LABOR: Status: RESOLVED | Noted: 2022-05-25 | Resolved: 2023-12-27

## 2023-12-27 PROBLEM — O99.323 DRUG USE AFFECTING PREGNANCY IN THIRD TRIMESTER: Status: RESOLVED | Noted: 2022-05-25 | Resolved: 2023-12-27

## 2023-12-27 PROBLEM — O99.322 DRUG USE AFFECTING PREGNANCY IN SECOND TRIMESTER: Status: RESOLVED | Noted: 2021-10-14 | Resolved: 2023-12-27

## 2023-12-27 PROBLEM — O99.342 DEPRESSION DURING PREGNANCY IN SECOND TRIMESTER: Status: RESOLVED | Noted: 2021-10-14 | Resolved: 2023-12-27

## 2023-12-27 PROBLEM — J02.9 SORE THROAT: Status: RESOLVED | Noted: 2021-04-16 | Resolved: 2023-12-27

## 2023-12-27 PROBLEM — O09.30 HISTORY OF INADEQUATE PRENATAL CARE: Status: RESOLVED | Noted: 2022-05-25 | Resolved: 2023-12-27

## 2023-12-27 PROBLEM — Z3A.40 40 WEEKS GESTATION OF PREGNANCY: Status: RESOLVED | Noted: 2022-05-25 | Resolved: 2023-12-27

## 2023-12-27 PROBLEM — O99.212 OBESITY AFFECTING PREGNANCY IN SECOND TRIMESTER: Status: RESOLVED | Noted: 2021-10-14 | Resolved: 2023-12-27

## 2023-12-27 PROBLEM — Z34.90 PREGNANT: Status: ACTIVE | Noted: 2023-12-27

## 2023-12-27 PROCEDURE — 99214 OFFICE O/P EST MOD 30 MIN: CPT | Performed by: ADVANCED PRACTICE MIDWIFE

## 2023-12-27 RX ORDER — ONDANSETRON 4 MG/1
TABLET, FILM COATED ORAL
COMMUNITY
Start: 2023-09-18 | End: 2023-12-29

## 2023-12-27 RX ORDER — ONDANSETRON 4 MG/1
TABLET, FILM COATED ORAL
Qty: 12 TABLET | Refills: 1 | Status: CANCELLED | OUTPATIENT
Start: 2023-12-27

## 2023-12-27 NOTE — PROGRESS NOTES
23-year old patient arrived to initiate prenatal care.     HPI: 23-year old . Patient's last menstrual period was 2023 (approximate). This was not a planned pregnancy. Pre-pregnancy weight of 214 pounds.    Previous prenatal history significant for: TSVD x 1; right vulvar laceration with repair  History significant for: osteochondromas, exostoses (manu in spine), asthma, anxiety, depression, tobacco use, back pain, obesity, THC use    The following portion of the patient's history were reviewed and updated as needed: allergies, current medications, past family history, past medical history, social history, surgical history, and problem list.    ROS: All systems reviewed and are negative with exception of the following: amenorrhea, nausea, vomiting, fatigue      US ordered today, reviewed and shows IUP of 14w3d gestation and Estimated Date of Delivery: 24  Last Pap Smear: None on record. Deferred to upcoming prenatal visit with patient request.     Exam:  Wt: 214 lb for TWG of 0 kg (0 lb), B/P 110/72, FHTs 159   General Appearance:  healthy-appearing . Obese.  HEENT:  NCAT, EOMI, neck supple, no thyroidmegaly.  HR str and reg. No murmur. Lungs clear. Resp even and unlabored.  Abd: Soft, nontender. No CVA tenderness. Uterus is consistent with EGA.  Ext: NT, nontender. No cyanosis or edema.    Diagnoses and all orders for this visit:    1. Encounter to determine fetal viability of pregnancy, single or unspecified fetus (Primary)  Ultrasound today and reviewed: By fetal biometry, estimated gestational age of 14 weeks, 3 days.     2. 14 weeks gestation of pregnancy  -     ToxASSURE Select 13 (MW) - Urine, Clean Catch  -     ABO / Rh  -     CBC & Differential  -     Antibody Screen  -     Hepatitis B Surface Antigen  -     RPR, Rfx Qn RPR / Confirm TP  -     Rubella Antibody, IgG  -     Urine Culture - , Urine, Clean Catch  -     HIV-1 / O / 2 Ag / Antibody  -     Varicella Zoster Antibody, IgG  -      Chlamydia trachomatis, Neisseria gonorrhoeae, Trichomonas vaginalis, PCR - Urine, Urine, Random Void  -     HCV Antibody Rfx To Qnt PCR  -     Hemoglobin A1c  -     Comprehensive Metabolic Panel  -     Ambulatory Referral to [facility]/Perinatology  -     ondansetron (Zofran) 4 MG tablet; Take 1 tablet by mouth Every 8 (Eight) Hours As Needed for Nausea or Vomiting.  Dispense: 30 tablet; Refill: 1    3. Multigravida in second trimester  Reviewed information in new OB packet, including OTC medications for use during pregnancy, second timester of pregnancy and discomforts, regular OB routine, ffDNA/chromosomal risk and maternal carrier screening testing.  Advised to maintain regular activity and/or exercise. Discussed bleeding and pelvic pain warnings and other signs to report. Discussed and ordered initial prenatal labs today. Second Trimester of Pregnancy video and Round Ligament Pain education included in AVS.    4. Nausea and vomiting in pregnancy  Discussed nausea relief measures both nonpharmacologic (small frequent meals or snacks, avoiding triggers, aromatherapy, ginger, hard and/or sour candies or Preggie Pops, ginger, accu-pressure wrist bands, citrus/citric acid) and pharmacologic (both OTC and Rx). Encouraged adequate hydration and intake of calories. Patient to notify provider if symptoms persist or worsen Morning Sickness and Doxylamine; Vitamin B6 Delayed- and Extended-Release Tablets education included in the AVS. Prescription for Zofran discussed and to be sent.  -     ondansetron (Zofran) 4 MG tablet; Take 1 tablet by mouth Every 8 (Eight) Hours As Needed for Nausea or Vomiting.  Dispense: 30 tablet; Refill: 1    5. Screening, , for malformation by ultrasound  Discussed anatomy scan to be completed through perinatology at approximately 20-weeks gestation. Anatomy scan completed through collaboration with their practice to assess for possible anomalies or markers for aneuploidy.   -      Ambulatory Referral to [facility]/Perinatology    6. Encounter for  screening for chromosomal anomalies  Discussed fetal chromosomal risk screening and reviewed pamphlet from the new OB folder.  She desires screening at this time.  -     [facility]er Prenatal Test: Chromosomes 13, 18, 21, X & Y: Triploidy 22Q.11.2 Deletion - Blood,    7. Testing of female for genetic disease carrier status  Discussed maternal carrier screening and reviewed pamphlet from the initial OB folder.  She desires screening.   -     [facility] Horizon 14 (Pan-Ethnic Standard) - Blood,    8. Immunization counseling  Discussed influenza vaccine recommendations during pregnancy. Patient declines to receive the influenza immunization today in the clinic. Encouraged to be screened and tested if she would experience flu-like illness symptoms. Influenza (Flu) Vaccine (Inactivated or Recombinant): What You Need to Know (VIS) education included in the AVS.        Return to the office in 4 weeks for a routine prenatal visit with a sneak peek ultrasound and as needed with concerns.        This note has been signed electronically.     Ingris Mohan, DNP, APRN, CNM, RNC-OB

## 2023-12-28 ENCOUNTER — TELEPHONE (OUTPATIENT)
Dept: OBSTETRICS AND GYNECOLOGY | Facility: CLINIC | Age: 23
End: 2023-12-28
Payer: COMMERCIAL

## 2023-12-28 NOTE — TELEPHONE ENCOUNTER
Pt called and left a v/m regarding a script for Zofran. She was seen in [facility] for new OB appt yesterday 12/27/23. Pt states she was expecting a script for Zofran to be sent to her pharmacy and when she checked with them, they had not received anything. Please advise

## 2023-12-29 LAB
ABO GROUP BLD: NORMAL
ALBUMIN SERPL-MCNC: 4 G/DL (ref 3.5–5.2)
ALBUMIN/GLOB SERPL: 1.3 G/DL
ALP SERPL-CCNC: 66 U/L (ref 39–117)
ALT SERPL-CCNC: 5 U/L (ref 1–33)
AST SERPL-CCNC: 7 U/L (ref 1–32)
BACTERIA UR CULT: NORMAL
BACTERIA UR CULT: NORMAL
BASOPHILS # BLD AUTO: 0.03 10*3/MM3 (ref 0–0.2)
BASOPHILS NFR BLD AUTO: 0.2 % (ref 0–1.5)
BILIRUB SERPL-MCNC: <0.2 MG/DL (ref 0–1.2)
BLD GP AB SCN SERPL QL: NEGATIVE
BUN SERPL-MCNC: 4 MG/DL (ref 6–20)
BUN/CREAT SERPL: 5.9 (ref 7–25)
C TRACH RRNA SPEC QL NAA+PROBE: NEGATIVE
CALCIUM SERPL-MCNC: 9.6 MG/DL (ref 8.6–10.5)
CHLORIDE SERPL-SCNC: 104 MMOL/L (ref 98–107)
CO2 SERPL-SCNC: 21.3 MMOL/L (ref 22–29)
CREAT SERPL-MCNC: 0.68 MG/DL (ref 0.57–1)
EGFRCR SERPLBLD CKD-EPI 2021: 126.5 ML/MIN/1.73
EOSINOPHIL # BLD AUTO: 0.01 10*3/MM3 (ref 0–0.4)
EOSINOPHIL NFR BLD AUTO: 0.1 % (ref 0.3–6.2)
ERYTHROCYTE [DISTWIDTH] IN BLOOD BY AUTOMATED COUNT: 14.3 % (ref 12.3–15.4)
GLOBULIN SER CALC-MCNC: 3.1 GM/DL
GLUCOSE SERPL-MCNC: 82 MG/DL (ref 65–99)
HBA1C MFR BLD: 5.4 % (ref 4.8–5.6)
HBV SURFACE AG SERPL QL IA: NEGATIVE
HCT VFR BLD AUTO: 35.7 % (ref 34–46.6)
HCV AB SERPL QL IA: NORMAL
HCV IGG SERPL QL IA: NON REACTIVE
HGB BLD-MCNC: 12.3 G/DL (ref 12–15.9)
HIV 1+2 AB+HIV1 P24 AG SERPL QL IA: NON REACTIVE
IMM GRANULOCYTES # BLD AUTO: 0.05 10*3/MM3 (ref 0–0.05)
IMM GRANULOCYTES NFR BLD AUTO: 0.4 % (ref 0–0.5)
LYMPHOCYTES # BLD AUTO: 3.04 10*3/MM3 (ref 0.7–3.1)
LYMPHOCYTES NFR BLD AUTO: 21.8 % (ref 19.6–45.3)
MCH RBC QN AUTO: 29.5 PG (ref 26.6–33)
MCHC RBC AUTO-ENTMCNC: 34.5 G/DL (ref 31.5–35.7)
MCV RBC AUTO: 85.6 FL (ref 79–97)
MONOCYTES # BLD AUTO: 0.56 10*3/MM3 (ref 0.1–0.9)
MONOCYTES NFR BLD AUTO: 4 % (ref 5–12)
N GONORRHOEA RRNA SPEC QL NAA+PROBE: NEGATIVE
NEUTROPHILS # BLD AUTO: 10.23 10*3/MM3 (ref 1.7–7)
NEUTROPHILS NFR BLD AUTO: 73.5 % (ref 42.7–76)
NRBC BLD AUTO-RTO: 0 /100 WBC (ref 0–0.2)
PLATELET # BLD AUTO: 254 10*3/MM3 (ref 140–450)
POTASSIUM SERPL-SCNC: 3.8 MMOL/L (ref 3.5–5.2)
PROT SERPL-MCNC: 7.1 G/DL (ref 6–8.5)
RBC # BLD AUTO: 4.17 10*6/MM3 (ref 3.77–5.28)
RH BLD: POSITIVE
RPR SER QL: NON REACTIVE
RUBV IGG SERPL IA-ACNC: 2.23 INDEX
SODIUM SERPL-SCNC: 139 MMOL/L (ref 136–145)
T VAGINALIS RRNA SPEC QL NAA+PROBE: NEGATIVE
VZV IGG SER IA-ACNC: 627 INDEX
WBC # BLD AUTO: 13.92 10*3/MM3 (ref 3.4–10.8)

## 2023-12-29 RX ORDER — ONDANSETRON 4 MG/1
4 TABLET, FILM COATED ORAL EVERY 8 HOURS PRN
Qty: 30 TABLET | Refills: 1 | Status: SHIPPED | OUTPATIENT
Start: 2023-12-29

## 2024-01-01 PROBLEM — Z91.199 NONCOMPLIANCE: Status: RESOLVED | Noted: 2022-05-25 | Resolved: 2024-01-01

## 2024-01-03 LAB — DRUGS UR: NORMAL

## 2024-01-08 ENCOUNTER — TELEPHONE (OUTPATIENT)
Dept: OBSTETRICS AND GYNECOLOGY | Facility: CLINIC | Age: 24
End: 2024-01-08
Payer: COMMERCIAL

## 2024-01-08 NOTE — TELEPHONE ENCOUNTER
Patient called requesting results of genetic screening- both horizon and panorama. Both have been scanned into chart but not yet reviewed. Can you review so I can inform?

## 2024-01-26 ENCOUNTER — REFERRAL TRIAGE (OUTPATIENT)
Dept: LABOR AND DELIVERY | Facility: HOSPITAL | Age: 24
End: 2024-01-26
Payer: COMMERCIAL

## 2024-01-29 ENCOUNTER — ROUTINE PRENATAL (OUTPATIENT)
Dept: OBSTETRICS AND GYNECOLOGY | Age: 24
End: 2024-01-29
Payer: COMMERCIAL

## 2024-01-29 ENCOUNTER — PATIENT OUTREACH (OUTPATIENT)
Dept: LABOR AND DELIVERY | Facility: HOSPITAL | Age: 24
End: 2024-01-29
Payer: COMMERCIAL

## 2024-01-29 VITALS — SYSTOLIC BLOOD PRESSURE: 124 MMHG | WEIGHT: 221 LBS | BODY MASS INDEX: 37.93 KG/M2 | DIASTOLIC BLOOD PRESSURE: 74 MMHG

## 2024-01-29 DIAGNOSIS — Z3A.19 19 WEEKS GESTATION OF PREGNANCY: Primary | ICD-10-CM

## 2024-01-29 DIAGNOSIS — J45.21 MILD INTERMITTENT ASTHMA WITH EXACERBATION: ICD-10-CM

## 2024-01-29 DIAGNOSIS — Z34.82 PRENATAL CARE, SUBSEQUENT PREGNANCY, SECOND TRIMESTER: ICD-10-CM

## 2024-01-29 LAB
GLUCOSE UR STRIP-MCNC: NEGATIVE MG/DL
PROT UR STRIP-MCNC: NEGATIVE MG/DL

## 2024-01-29 PROCEDURE — 99213 OFFICE O/P EST LOW 20 MIN: CPT | Performed by: OBSTETRICS & GYNECOLOGY

## 2024-01-29 RX ORDER — ALBUTEROL SULFATE 90 UG/1
2 AEROSOL, METERED RESPIRATORY (INHALATION)
Status: SHIPPED | OUTPATIENT
Start: 2024-01-29

## 2024-01-29 NOTE — PROGRESS NOTES
No complaints except asthma symptoms. Needs a new inhaler.  Denies cough.  Is trying to stop smoking as she feels it is making it worse. Discussed importance of smoking cessation.  GFM. Normal NIPS.  Denies ctx, pain, VB.    /74   Wt 100 kg (221 lb)   LMP 09/09/2023 (Approximate)   BMI 37.93 kg/m²    FH 20  Urine protein and glucose negative    Diagnoses and all orders for this visit:    1. 19 weeks gestation of pregnancy (Primary)  -     POC Urinalysis Dipstick  IUP at 19 weeks gestation for return OB.  RTC 4 weeks. Doing well. Has anatomy US tomorrow.   2. Mild intermittent asthma with exacerbation  -     albuterol sulfate HFA (PROVENTIL HFA;VENTOLIN HFA;PROAIR HFA) inhaler 2 puff  Mild asthma, albuterol MDI as needed.   3. Prenatal care, subsequent pregnancy, second trimester

## 2024-01-29 NOTE — OUTREACH NOTE
[facility]  Unable to Reach       Questions/Answers      Flowsheet Row Responses   Pending Outreach Confirm Patient Interest   Call Attempt First   Outcome No answer/busy                Charlene Weston RN  Maternity Nurse Navigator    1/29/2024, 14:34 CST

## 2024-01-31 ENCOUNTER — PATIENT OUTREACH (OUTPATIENT)
Dept: LABOR AND DELIVERY | Facility: HOSPITAL | Age: 24
End: 2024-01-31
Payer: COMMERCIAL

## 2024-01-31 NOTE — OUTREACH NOTE
[facility]  Unable to Reach       Questions/Answers      Flowsheet Row Responses   Pending Outreach Confirm Patient Interest   Call Attempt Second   Outcome No answer/timoteoy                Charlene Weston RN  Maternity Nurse Navigator    1/31/2024, 11:13 CST

## 2024-02-02 ENCOUNTER — PATIENT OUTREACH (OUTPATIENT)
Dept: LABOR AND DELIVERY | Facility: HOSPITAL | Age: 24
End: 2024-02-02
Payer: COMMERCIAL

## 2024-02-02 NOTE — OUTREACH NOTE
[facility]  Unable to Reach       Questions/Answers      Flowsheet Row Responses   Pending Outreach Confirm Patient Interest   Call Attempt Third   Outcome No answer/busy, MyChart message sent to patient            Program declined due to not being able to reach patient.     Charlene Weston RN  Maternity Nurse Navigator    2/2/2024, 10:17 CST

## 2024-02-20 DIAGNOSIS — Z3A.14 14 WEEKS GESTATION OF PREGNANCY: ICD-10-CM

## 2024-02-20 DIAGNOSIS — O21.9 NAUSEA AND VOMITING IN PREGNANCY: ICD-10-CM

## 2024-02-20 RX ORDER — ONDANSETRON 4 MG/1
4 TABLET, FILM COATED ORAL EVERY 8 HOURS PRN
Qty: 30 TABLET | Refills: 1 | Status: SHIPPED | OUTPATIENT
Start: 2024-02-20

## 2024-02-28 ENCOUNTER — ROUTINE PRENATAL (OUTPATIENT)
Dept: OBSTETRICS AND GYNECOLOGY | Age: 24
End: 2024-02-28
Payer: COMMERCIAL

## 2024-02-28 VITALS — BODY MASS INDEX: 37.25 KG/M2 | DIASTOLIC BLOOD PRESSURE: 68 MMHG | SYSTOLIC BLOOD PRESSURE: 118 MMHG | WEIGHT: 217 LBS

## 2024-02-28 DIAGNOSIS — O99.330 TOBACCO USE DURING PREGNANCY, ANTEPARTUM: ICD-10-CM

## 2024-02-28 DIAGNOSIS — Z3A.23 23 WEEKS GESTATION OF PREGNANCY: Primary | ICD-10-CM

## 2024-02-28 DIAGNOSIS — R12 HEARTBURN DURING PREGNANCY IN SECOND TRIMESTER: ICD-10-CM

## 2024-02-28 DIAGNOSIS — Z71.85 IMMUNIZATION COUNSELING: ICD-10-CM

## 2024-02-28 DIAGNOSIS — O26.892 HEARTBURN DURING PREGNANCY IN SECOND TRIMESTER: ICD-10-CM

## 2024-02-28 DIAGNOSIS — Z34.82 MULTIGRAVIDA IN SECOND TRIMESTER: ICD-10-CM

## 2024-02-28 LAB
CLARITY, POC: CLEAR
COLOR UR: YELLOW
GLUCOSE UR STRIP-MCNC: NEGATIVE MG/DL
PROT UR STRIP-MCNC: ABNORMAL MG/DL

## 2024-02-28 RX ORDER — FAMOTIDINE 20 MG/1
20 TABLET, FILM COATED ORAL DAILY
Qty: 30 TABLET | Refills: 1 | Status: SHIPPED | OUTPATIENT
Start: 2024-02-28 | End: 2025-02-27

## 2024-02-28 NOTE — PROGRESS NOTES
Reason for visit: Routine OB visit at 23w3d     CC:  Concerned with a acid like feeling in upper abdomen, lower chest. Endorses feeling fetal movement. Denies VB, LOF, contractions, h/a, visual changes, and right upper quadrant pain.     ROS: All systems reviewed and are negative with exception of the following: heartburn    Wt 217 lb for a TWG of 1.361 kg (3 lb), /68, FHTs 140, FH 24 cm  Urine today and reviewed: negative glucose, trace protein    21-week Anatomy scan: EFW 22%; limited views but visualized anatomy normal; anterior placenta without evidence of placenta previa    Exam:  General Appearance:  No visualized signs of distress. Normal mood and behavior  Cardiorespiratory: HR str and reg. Lungs clear. Resp even and unlabored.  Abdomen: is soft and nontender. No CVA tenderness. Uterus is consistent with estimated gestational age  Ext: No edema. Calves non-tender.     Impression  Diagnoses and all orders for this visit:    1. 23 weeks gestation of pregnancy (Primary)  -     POC Urinalysis Dipstick  -     famotidine (Pepcid) 20 MG tablet; Take 1 tablet by mouth Daily.  Dispense: 30 tablet; Refill: 1    2. Multigravida in second trimester  Discussed second trimester of pregnancy, discomforts and measures of support, fetal movement,  labor warnings, preeclampsia warnings, and signs and symptoms to report. Also discussed with patient plan for hemoglobin and glucose tolerance testing. Instructions on glucose tolerance test and dietary intake prior to the test provided. Reviewed normal anatomy scan. Patient to notify provider or come to the hospital with vaginal bleeding, leaking of fluid, contractions, or other concerns. Second Trimester of Pregnancy video and Round Ligament Pain education included in the AVS.    3. Heartburn during pregnancy in second trimester  Discussed measures of support, including OTC medications (Tums, Pepcid), reflux precautions, and Rx medications if indicated. Sent  prescription for Pepcid for patient's heartburn at this time. Heartburn During Pregnancy education included in the AVS.   -     famotidine (Pepcid) 20 MG tablet; Take 1 tablet by mouth Daily.  Dispense: 30 tablet; Refill: 1    4. Immunization counseling  Discussed influenza vaccine recommendations during pregnancy. Patient declines to receive the influenza immunization this prenatal visit. Reviewed immune health practices and self-care measures to promote well-being. Encouraged screening and testing if patient experiences influenza-like illness symptoms for treatment with Tamiflu if indicated. Influenza (Flu) Vaccine (Inactivated or Recombinant): What You Need to Know (VIS) education included in the AVS.    Reviewed Tdap immunization recommendations during pregnancy. Patient to consider receiving the Tdap immunization during her next prenatal visit. Tdap (Tetanus, Diptheria, Pertussis) Vaccine: What You Need to Know (VIS) education included in the AVS.    5. Tobacco use during pregnancy, antepartum  Pregnancy and Smoking education included in the AVS.          Return to the office in 4 weeks for routine prenatal visit with GTT with Dr. Chapman and as needed with concerns.        This note has been signed electronically.    Ingris Mohan, DNP, APRN, CNM, RNC-OB

## 2024-03-20 ENCOUNTER — ROUTINE PRENATAL (OUTPATIENT)
Dept: OBSTETRICS AND GYNECOLOGY | Age: 24
End: 2024-03-20
Payer: COMMERCIAL

## 2024-03-20 VITALS — WEIGHT: 206 LBS | DIASTOLIC BLOOD PRESSURE: 74 MMHG | BODY MASS INDEX: 35.36 KG/M2 | SYSTOLIC BLOOD PRESSURE: 118 MMHG

## 2024-03-20 DIAGNOSIS — O26.892 VAGINAL DISCHARGE DURING PREGNANCY IN SECOND TRIMESTER: ICD-10-CM

## 2024-03-20 DIAGNOSIS — N89.8 VAGINAL DISCHARGE DURING PREGNANCY IN SECOND TRIMESTER: ICD-10-CM

## 2024-03-20 DIAGNOSIS — Z3A.26 26 WEEKS GESTATION OF PREGNANCY: Primary | ICD-10-CM

## 2024-03-20 DIAGNOSIS — Z34.82 MULTIGRAVIDA IN SECOND TRIMESTER: ICD-10-CM

## 2024-03-20 LAB
GLUCOSE UR STRIP-MCNC: NEGATIVE MG/DL
PROT UR STRIP-MCNC: ABNORMAL MG/DL

## 2024-03-20 RX ORDER — AZITHROMYCIN 250 MG/1
TABLET, FILM COATED ORAL
COMMUNITY
Start: 2024-03-18

## 2024-03-20 RX ORDER — SODIUM CHLORIDE 0.65 %
AEROSOL, SPRAY (ML) NASAL
COMMUNITY
Start: 2024-03-18

## 2024-03-20 RX ORDER — CETIRIZINE HYDROCHLORIDE 10 MG/1
TABLET ORAL
COMMUNITY
Start: 2024-03-18

## 2024-03-20 NOTE — PROGRESS NOTES
Pt with green vaginal discharge, no odor.  Has appt for 1 hour GTT next week. Has appt at [facility] for follow up US today. Pt diagnosed with bronchitis on zpack.     /74   Wt 93.4 kg (206 lb)   LMP 09/09/2023 (Approximate)   BMI 35.36 kg/m²    FHTs 152  FH 26  Urine glucose negative, urine protein trace  Pelvic exam: cervix visually closed/thick, no abnormal vaginal discharge seen, no pooling    Diagnoses and all orders for this visit:    1. 26 weeks gestation of pregnancy (Primary)  -     POC Urinalysis Dipstick    2. Multigravida in second trimester  IUP at 26 weeks gestation. Follow up US at [facility] today. RTC 1 week with 1 hour GTT.   3. Vaginal discharge during pregnancy in second trimester  Testing today. Exam normal. Will await testing to treat if needed.

## 2024-03-22 LAB
A VAGINAE DNA VAG QL NAA+PROBE: NORMAL SCORE
BVAB2 DNA VAG QL NAA+PROBE: NORMAL SCORE
C ALBICANS DNA VAG QL NAA+PROBE: NEGATIVE
C GLABRATA DNA VAG QL NAA+PROBE: NEGATIVE
C TRACH DNA VAG QL NAA+PROBE: NEGATIVE
MEGA1 DNA VAG QL NAA+PROBE: NORMAL SCORE
N GONORRHOEA DNA VAG QL NAA+PROBE: NEGATIVE
T VAGINALIS DNA VAG QL NAA+PROBE: NEGATIVE

## 2024-03-27 ENCOUNTER — ROUTINE PRENATAL (OUTPATIENT)
Age: 24
End: 2024-03-27
Payer: COMMERCIAL

## 2024-03-27 VITALS — DIASTOLIC BLOOD PRESSURE: 74 MMHG | WEIGHT: 210 LBS | SYSTOLIC BLOOD PRESSURE: 118 MMHG | BODY MASS INDEX: 36.05 KG/M2

## 2024-03-27 DIAGNOSIS — Z3A.27 27 WEEKS GESTATION OF PREGNANCY: Primary | ICD-10-CM

## 2024-03-27 LAB
GLUCOSE UR STRIP-MCNC: NEGATIVE MG/DL
PROT UR STRIP-MCNC: ABNORMAL MG/DL

## 2024-03-27 NOTE — PROGRESS NOTES
Did not eat before she came for 1 hour GTT.  No complaints. Bronchitis resolved. Reports GFM. Denies VB, ctx, LOF    /74   Wt 95.3 kg (210 lb)   LMP 09/09/2023 (Approximate)   BMI 36.05 kg/m²    FHTs 150      Diagnoses and all orders for this visit:    1. 27 weeks gestation of pregnancy (Primary)  -     Gestational Screen 1 Hr ([facility])  -     RPR  -     POC Urinalysis Dipstick  -     CBC (No Diff)  Pt did not eat and lab annabelle will not give 1 hour GTT. RTC 1-2 weeks for 1 hour GTT, RPR and CBC.  No complaints today. Doing well.

## 2024-04-09 ENCOUNTER — TELEPHONE (OUTPATIENT)
Dept: OBSTETRICS AND GYNECOLOGY | Age: 24
End: 2024-04-09

## 2024-04-16 DIAGNOSIS — O21.9 NAUSEA AND VOMITING IN PREGNANCY: ICD-10-CM

## 2024-04-16 DIAGNOSIS — Z3A.14 14 WEEKS GESTATION OF PREGNANCY: ICD-10-CM

## 2024-04-16 RX ORDER — ONDANSETRON 4 MG/1
4 TABLET, FILM COATED ORAL EVERY 8 HOURS PRN
Qty: 30 TABLET | Refills: 0 | Status: SHIPPED | OUTPATIENT
Start: 2024-04-16 | End: 2024-04-17 | Stop reason: SDUPTHER

## 2024-04-17 ENCOUNTER — ROUTINE PRENATAL (OUTPATIENT)
Dept: OBSTETRICS AND GYNECOLOGY | Age: 24
End: 2024-04-17
Payer: COMMERCIAL

## 2024-04-17 VITALS — BODY MASS INDEX: 36.9 KG/M2 | DIASTOLIC BLOOD PRESSURE: 74 MMHG | SYSTOLIC BLOOD PRESSURE: 122 MMHG | WEIGHT: 215 LBS

## 2024-04-17 DIAGNOSIS — O99.210 OTHER OBESITY AFFECTING PREGNANCY, ANTEPARTUM: ICD-10-CM

## 2024-04-17 DIAGNOSIS — O99.330 TOBACCO USE DURING PREGNANCY, ANTEPARTUM: ICD-10-CM

## 2024-04-17 DIAGNOSIS — Z3A.30 30 WEEKS GESTATION OF PREGNANCY: Primary | ICD-10-CM

## 2024-04-17 DIAGNOSIS — E66.8 OTHER OBESITY AFFECTING PREGNANCY, ANTEPARTUM: ICD-10-CM

## 2024-04-17 DIAGNOSIS — O21.9 NAUSEA AND VOMITING IN PREGNANCY: ICD-10-CM

## 2024-04-17 LAB
GLUCOSE UR STRIP-MCNC: NEGATIVE MG/DL
PROT UR STRIP-MCNC: NEGATIVE MG/DL

## 2024-04-17 RX ORDER — ONDANSETRON 4 MG/1
4 TABLET, FILM COATED ORAL EVERY 8 HOURS PRN
Qty: 30 TABLET | Refills: 0 | Status: SHIPPED | OUTPATIENT
Start: 2024-04-17

## 2024-04-17 NOTE — PROGRESS NOTES
No complaints. Needs zofran refilled for nausea. Endorses appropriate fetal movement. Denies regular contractions, leakage of fluid, vaginal bleeding or discharge.    Total weight gain: 0.454 kg (1 lb)  Expected weight gain: 5 kg (11 lb)-9 kg (19 lb)    Tobacco use - advised to stop, reports cutting back so far; encouraged her to stop soon  THC use - advised to stop    GTT today  Kick counts/Labor precautions  RTC 2 weeks with growth/4D    Diagnoses and all orders for this visit:    1. 30 weeks gestation of pregnancy (Primary)  -     POC Urinalysis Dipstick    2. Nausea and vomiting in pregnancy  -     ondansetron (ZOFRAN) 4 MG tablet; Take 1 tablet by mouth Every 8 (Eight) Hours As Needed for Nausea or Vomiting.  Dispense: 30 tablet; Refill: 0    3. Tobacco use during pregnancy, antepartum    4. Other obesity affecting pregnancy, antepartum    5. Drug exposure, gestational

## 2024-04-18 LAB
ERYTHROCYTE [DISTWIDTH] IN BLOOD BY AUTOMATED COUNT: 13.5 % (ref 12.3–15.4)
GLUCOSE 1H P 50 G GLC PO SERPL-MCNC: 110 MG/DL (ref 65–139)
HCT VFR BLD AUTO: 32.5 % (ref 34–46.6)
HGB BLD-MCNC: 10.7 G/DL (ref 12–15.9)
MCH RBC QN AUTO: 29.1 PG (ref 26.6–33)
MCHC RBC AUTO-ENTMCNC: 32.9 G/DL (ref 31.5–35.7)
MCV RBC AUTO: 88.3 FL (ref 79–97)
PLATELET # BLD AUTO: 246 10*3/MM3 (ref 140–450)
RBC # BLD AUTO: 3.68 10*6/MM3 (ref 3.77–5.28)
RPR SER QL: NON REACTIVE
WBC # BLD AUTO: 10.97 10*3/MM3 (ref 3.4–10.8)

## 2024-05-01 ENCOUNTER — ROUTINE PRENATAL (OUTPATIENT)
Dept: OBSTETRICS AND GYNECOLOGY | Age: 24
End: 2024-05-01
Payer: COMMERCIAL

## 2024-05-01 VITALS — BODY MASS INDEX: 36.73 KG/M2 | DIASTOLIC BLOOD PRESSURE: 64 MMHG | SYSTOLIC BLOOD PRESSURE: 100 MMHG | WEIGHT: 214 LBS

## 2024-05-01 DIAGNOSIS — O99.210 OBESITY IN PREGNANCY, ANTEPARTUM: ICD-10-CM

## 2024-05-01 DIAGNOSIS — Z3A.32 32 WEEKS GESTATION OF PREGNANCY: Primary | ICD-10-CM

## 2024-05-01 LAB
GLUCOSE UR STRIP-MCNC: NEGATIVE MG/DL
PROT UR STRIP-MCNC: NEGATIVE MG/DL

## 2024-05-01 NOTE — PROGRESS NOTES
32 wk US for growth r/t BMI    Impression:  Intrauterine pregnancy at 32w3d  Placental location: Anterior  Normal Interval growth  EFW:30%ile, AC:50%ile  Estimated fetal weight:  1902 g  Fetal position: Vertex  YULI: 14 cm  DVP: 6.4 cm  Fetal heart rate: 127     Relevant comparison data: No relevant comparison data     José Miguel Downing MD  5/1/2024 13:07 CDT    Pt reports good fetal movements and voices no concerns.     Tdap today.     Pt denies UC's, pelvic pain, vaginal bleeding, leaking of fluid, HA, visual disturbances and epigastric pain.  Discussed plan of care and S&S to report.

## 2024-05-15 ENCOUNTER — ROUTINE PRENATAL (OUTPATIENT)
Age: 24
End: 2024-05-15
Payer: COMMERCIAL

## 2024-05-15 VITALS — SYSTOLIC BLOOD PRESSURE: 124 MMHG | BODY MASS INDEX: 36.22 KG/M2 | WEIGHT: 211 LBS | DIASTOLIC BLOOD PRESSURE: 72 MMHG

## 2024-05-15 DIAGNOSIS — O99.330 TOBACCO USE DURING PREGNANCY, ANTEPARTUM: ICD-10-CM

## 2024-05-15 DIAGNOSIS — O99.019 MATERNAL ANEMIA IN PREGNANCY, ANTEPARTUM: ICD-10-CM

## 2024-05-15 DIAGNOSIS — Z3A.34 34 WEEKS GESTATION OF PREGNANCY: Primary | ICD-10-CM

## 2024-05-15 DIAGNOSIS — Z34.83 MULTIGRAVIDA IN THIRD TRIMESTER: ICD-10-CM

## 2024-05-15 LAB
GLUCOSE UR STRIP-MCNC: NEGATIVE MG/DL
PROT UR STRIP-MCNC: NEGATIVE MG/DL

## 2024-05-15 PROCEDURE — 99213 OFFICE O/P EST LOW 20 MIN: CPT | Performed by: ADVANCED PRACTICE MIDWIFE

## 2024-05-15 NOTE — PROGRESS NOTES
Reason for visit: Routine OB visit at 34w3d     CC:  She has been having some lower abdomen cramping or pain. Endorses good fetal movement. Denies VB, LOF, contractions, h/a, visual changes, and right upper quadrant pain.     ROS: All systems reviewed and are negative with exception of the following: amenorrhea, BH contractions, nausea      Weight 211 lb; /72; FHTs 152; FH 35 cm  Total weight gain: -1.361 kg (-3 lb) with Total weight gain expected 5 kg (11 lb)-9 kg (19 lb)    Urine today and reviewed: negative glucose, negative protein    32-week U/S: EFW 30%, 1902 g; AC 50%; YULI 14 cm, DVP 6.4 cm; appropriate interval growth; vertex presentation; anterior placenta     Exam:  General Appearance:  No visualized signs of distress. Normal mood and behavior  Cardiorespiratory: HR str and reg. Lungs clear. Resp even and unlabored.  Abdomen: is soft and nontender. No CVA tenderness. Uterus is consistent with estimated gestational age.  Ext: No edema. Calves non-tender.     Impression  Diagnoses and all orders for this visit:    1. 34 weeks gestation of pregnancy (Primary)  Discussed and encouraged practicing measures of relaxation during the birthing experience.   -     POC Urinalysis Dipstick    2. Multigravida in third trimester  Discussed third trimester of pregnancy, discomforts and measures of support, fetal movement counts,  labor warnings, preeclampsia warnings, and signs and symptoms to report. Discussed plan for next visit to include cultures for GBS, chlamydia, and gonorrhea. Patient to come to the hospital or call for vaginal bleeding, leaking of fluid, regular or intense contractions, or other concerns. Signs and Symptoms of Labor and Alternative Pain Management During Labor and Delivery videos included in the AVS.    3. Maternal anemia in pregnancy, antepartum  Last hemoglobin 40.7 g/dL. Discussed measures of support, including iron supplement at least every other day with a source of vitamin C  and dietary sources. Pregnancy and Anemia education included in the AVS.     4. Tobacco use during pregnancy, antepartum  Can contribute to anemia.           Return to the office in 2 weeks for routine prenatal visit with Dr. Downing and as needed with concerns.        This note has been signed electronically.    Ingris Mohan, DNP, APRN, CNM, RNC-OB

## 2024-05-27 PROBLEM — O99.330 TOBACCO USE DURING PREGNANCY, ANTEPARTUM: Status: ACTIVE | Noted: 2024-05-27

## 2024-05-29 ENCOUNTER — ROUTINE PRENATAL (OUTPATIENT)
Age: 24
End: 2024-05-29
Payer: COMMERCIAL

## 2024-05-29 VITALS — DIASTOLIC BLOOD PRESSURE: 82 MMHG | BODY MASS INDEX: 36.22 KG/M2 | SYSTOLIC BLOOD PRESSURE: 122 MMHG | WEIGHT: 211 LBS

## 2024-05-29 DIAGNOSIS — Z3A.36 36 WEEKS GESTATION OF PREGNANCY: Primary | ICD-10-CM

## 2024-05-29 DIAGNOSIS — Z34.83 MULTIGRAVIDA IN THIRD TRIMESTER: ICD-10-CM

## 2024-05-29 LAB
GLUCOSE UR STRIP-MCNC: NEGATIVE MG/DL
PROT UR STRIP-MCNC: NEGATIVE MG/DL

## 2024-05-29 NOTE — PROGRESS NOTES
Reason for visit: Routine OB visit at 36w3d     CC:  Pelvic pressure, occasional contractions, nausea, diarrhea . Endorses regular fetal movement. Denies VB, LOF, h/a, visual changes, and right upper quadrant pain.     ROS: All systems reviewed and are negative with exception of the following: pelvic pressure, occasional contractions, nausea (zofran helps), diarrhea (states due to gallstones), amenorrhea    Wt 95.7 kg (211 lb) lb for a TWG of -1.361 kg (-3 lb), /82, FHTs 157, FH 36  Urine today and reviewed: neg glucose, neg protein    US 5/1/24: EFW 30%, AC 50%; YULI 14, DVP 6.4, vertex, anterior placenta without evidence of placenta previa    Exam:  General Appearance:  No visualized signs of distress. Normal mood and behavior  Cardiorespiratory: HR str and reg. Lungs clear. Resp even and unlabored.  Abdomen: is soft and nontender. No CVA tenderness. Uterus is round and non-tender.   Ext: No edema. Calves non-tender.     Impression  Diagnoses and all orders for this visit:    1. 36 weeks gestation of pregnancy (Primary)  Discussed third trimester discomforts and measures of support, fetal movement counts, signs of labor, preeclampsia warnings, and signs and symptoms to report. Reviewed GBS cultures to be obtained today. Discussed and encouraged to come to the hospital or call with vaginal bleeding, leaking of fluid, regular contractions, or other concerns. Signs and Symptoms of Labor and Alternative Pain Management During Labor and Delivery videos included in the AVS.   -     Chlamydia trachomatis, Neisseria gonorrhoeae, PCR w/ confirmation - Swab, Cervix  -     Strep B Screen - Swab, Vaginal/Rectum  -     POC Urinalysis Dipstick    2. Multigravida in third trimester  Desires elective IOL at 39 weeks            Return to the office in 1 week  for routine follow-up and as needed with concerns.        This note has been signed electronically.    JUAN Schaefer, AYDEN

## 2024-06-02 LAB
C TRACH RRNA SPEC QL NAA+PROBE: NEGATIVE
GP B STREP DNA SPEC QL NAA+PROBE: POSITIVE
N GONORRHOEA RRNA SPEC QL NAA+PROBE: NEGATIVE

## 2024-06-05 ENCOUNTER — ROUTINE PRENATAL (OUTPATIENT)
Dept: OBSTETRICS AND GYNECOLOGY | Age: 24
End: 2024-06-05
Payer: COMMERCIAL

## 2024-06-05 VITALS — BODY MASS INDEX: 35.7 KG/M2 | SYSTOLIC BLOOD PRESSURE: 126 MMHG | WEIGHT: 208 LBS | DIASTOLIC BLOOD PRESSURE: 84 MMHG

## 2024-06-05 DIAGNOSIS — O99.519 ASTHMA DURING PREGNANCY: ICD-10-CM

## 2024-06-05 DIAGNOSIS — Z3A.37 37 WEEKS GESTATION OF PREGNANCY: Primary | ICD-10-CM

## 2024-06-05 DIAGNOSIS — J45.909 ASTHMA DURING PREGNANCY: ICD-10-CM

## 2024-06-05 DIAGNOSIS — J06.9 ACUTE URI: ICD-10-CM

## 2024-06-05 LAB
GLUCOSE UR STRIP-MCNC: NEGATIVE MG/DL
PROT UR STRIP-MCNC: NEGATIVE MG/DL

## 2024-06-05 RX ORDER — ONDANSETRON 2 MG/ML
4 INJECTION INTRAMUSCULAR; INTRAVENOUS EVERY 6 HOURS PRN
OUTPATIENT
Start: 2024-06-05

## 2024-06-05 RX ORDER — METHYLERGONOVINE MALEATE 0.2 MG/ML
200 INJECTION INTRAVENOUS ONCE AS NEEDED
OUTPATIENT
Start: 2024-06-05

## 2024-06-05 RX ORDER — TERBUTALINE SULFATE 1 MG/ML
0.25 INJECTION, SOLUTION SUBCUTANEOUS AS NEEDED
OUTPATIENT
Start: 2024-06-05

## 2024-06-05 RX ORDER — CITRIC ACID/SODIUM CITRATE 334-500MG
30 SOLUTION, ORAL ORAL ONCE AS NEEDED
OUTPATIENT
Start: 2024-06-05

## 2024-06-05 RX ORDER — ALBUTEROL SULFATE 90 UG/1
2 AEROSOL, METERED RESPIRATORY (INHALATION) EVERY 4 HOURS PRN
Qty: 8 G | Refills: 1 | Status: SHIPPED | OUTPATIENT
Start: 2024-06-05

## 2024-06-05 RX ORDER — HYDROMORPHONE HYDROCHLORIDE 1 MG/ML
1 INJECTION, SOLUTION INTRAMUSCULAR; INTRAVENOUS; SUBCUTANEOUS
OUTPATIENT
Start: 2024-06-05 | End: 2024-06-15

## 2024-06-05 RX ORDER — ACETAMINOPHEN 325 MG/1
650 TABLET ORAL EVERY 4 HOURS PRN
OUTPATIENT
Start: 2024-06-05

## 2024-06-05 RX ORDER — SODIUM CHLORIDE, SODIUM LACTATE, POTASSIUM CHLORIDE, CALCIUM CHLORIDE 600; 310; 30; 20 MG/100ML; MG/100ML; MG/100ML; MG/100ML
125 INJECTION, SOLUTION INTRAVENOUS CONTINUOUS
OUTPATIENT
Start: 2024-06-05

## 2024-06-05 RX ORDER — MORPHINE SULFATE 10 MG/ML
2 INJECTION INTRAMUSCULAR; INTRAVENOUS; SUBCUTANEOUS
OUTPATIENT
Start: 2024-06-05 | End: 2024-06-10

## 2024-06-05 RX ORDER — SODIUM CHLORIDE 0.9 % (FLUSH) 0.9 %
10 SYRINGE (ML) INJECTION AS NEEDED
OUTPATIENT
Start: 2024-06-05

## 2024-06-05 RX ORDER — SODIUM CHLORIDE 0.9 % (FLUSH) 0.9 %
10 SYRINGE (ML) INJECTION EVERY 12 HOURS SCHEDULED
OUTPATIENT
Start: 2024-06-05

## 2024-06-05 RX ORDER — OXYTOCIN/0.9 % SODIUM CHLORIDE 30/500 ML
250 PLASTIC BAG, INJECTION (ML) INTRAVENOUS CONTINUOUS
OUTPATIENT
Start: 2024-06-05 | End: 2024-06-05

## 2024-06-05 RX ORDER — OXYTOCIN/0.9 % SODIUM CHLORIDE 30/500 ML
999 PLASTIC BAG, INJECTION (ML) INTRAVENOUS ONCE
OUTPATIENT
Start: 2024-06-05

## 2024-06-05 RX ORDER — SODIUM CHLORIDE 9 MG/ML
40 INJECTION, SOLUTION INTRAVENOUS AS NEEDED
OUTPATIENT
Start: 2024-06-05

## 2024-06-05 RX ORDER — OXYTOCIN/0.9 % SODIUM CHLORIDE 30/500 ML
2-20 PLASTIC BAG, INJECTION (ML) INTRAVENOUS
OUTPATIENT
Start: 2024-06-05

## 2024-06-05 RX ORDER — ONDANSETRON 4 MG/1
4 TABLET, ORALLY DISINTEGRATING ORAL EVERY 6 HOURS PRN
OUTPATIENT
Start: 2024-06-05

## 2024-06-05 RX ORDER — CARBOPROST TROMETHAMINE 250 UG/ML
250 INJECTION, SOLUTION INTRAMUSCULAR AS NEEDED
OUTPATIENT
Start: 2024-06-05

## 2024-06-05 RX ORDER — MISOPROSTOL 100 UG/1
800 TABLET ORAL ONCE
OUTPATIENT
Start: 2024-06-05 | End: 2024-06-05

## 2024-06-05 RX ORDER — CALCIUM CARBONATE 500 MG/1
2 TABLET, CHEWABLE ORAL 3 TIMES DAILY PRN
OUTPATIENT
Start: 2024-06-05

## 2024-06-05 RX ORDER — IBUPROFEN 200 MG
600 TABLET ORAL EVERY 6 HOURS PRN
OUTPATIENT
Start: 2024-06-05

## 2024-06-05 NOTE — PROGRESS NOTES
Coughing. Possible bronchitis. Encouraged urgent care / PCP for evaluation. Refilled albuterol.  Endorses appropriate fetal movement. Denies regular contractions, leakage of fluid, vaginal bleeding or discharge.    Total weight gain: -2.722 kg (-6 lb)  Expected weight gain: 5 kg (11 lb)-9 kg (19 lb)    RN chaperone present for exam. SVE finger-tip    IOL - desires elective at 39 weeks. Plan for 6/17 with pitocin.   Kick counts/Labor precautions  RTC 1 weeks    Diagnoses and all orders for this visit:    1. 37 weeks gestation of pregnancy (Primary)  -     POC Urinalysis Dipstick  -     sodium chloride 0.9 % flush 10 mL  -     sodium chloride 0.9 % flush 10 mL  -     sodium chloride 0.9 % infusion 40 mL  -     lactated ringers bolus 1,000 mL  -     lactated ringers infusion  -     acetaminophen (TYLENOL) tablet 650 mg  -     Morphine injection 2 mg  -     Morphine injection 2 mg  -     ondansetron ODT (ZOFRAN-ODT) disintegrating tablet 4 mg  -     ondansetron (ZOFRAN) injection 4 mg  -     terbutaline (BRETHINE) injection 0.25 mg  -     penicillin G potassium 5 Million Units in sodium chloride 0.9 % 100 mL IVPB  -     penicillin G potassium 3 Million Units in sodium chloride 0.9 % 100 mL IVPB  -     Sod Citrate-Citric Acid (BICITRA) oral solution 30 mL  -     ibuprofen (ADVIL,MOTRIN) tablet 600 mg  -     HYDROmorphone PF (DILAUDID) injection 1 mg  -     methylergonovine (METHERGINE) injection 200 mcg  -     carboprost (HEMABATE) injection 250 mcg  -     miSOPROStol (CYTOTEC) tablet 800 mcg  -     ondansetron ODT (ZOFRAN-ODT) disintegrating tablet 4 mg  -     ondansetron (ZOFRAN) injection 4 mg  -     calcium carbonate (TUMS) chewable tablet 500 mg (200 mg elemental)  -     oxytocin (PITOCIN) 30 units in 0.9% sodium chloride 500 mL (premix)  -     oxytocin (PITOCIN) 30 units in 0.9% sodium chloride 500 mL (premix)  -     oxytocin (PITOCIN) 30 units in 0.9% sodium chloride 500 mL (premix)    2. Asthma during  pregnancy  -     albuterol sulfate  (90 Base) MCG/ACT inhaler; Inhale 2 puffs Every 4 (Four) Hours As Needed for Wheezing or Shortness of Air.  Dispense: 8 g; Refill: 1    3. Acute URI    Other orders  -     Admit To Obstetrics Inpatient; Standing  -     Code Status and Medical Interventions:; Standing  -     Obtain informed consent; Standing  -     Vital Signs q 4 while awake; Standing  -     Vital Signs Per Hospital Policy; Standing  -     Confirm presence of amniotic fluid; Standing  -     Continuous Fetal Monitoring With NST on Admission and Prior to Initiation of Oxytocin.; Standing  -     External Uterine Contraction Monitoring; Standing  -     Notify Physician (specified); Standing  -     Notify physician for tachysystole (per hospital algorithm); Standing  -     Notify physician if membranes ruptured, bleeding greater than 1 pad an hour, fetal heart tone abnormality, and severe pain; Standing  -     Up Ad Gem; Standing  -     Intake and Output; Standing  -     Cervical Exam; Standing  -     Initiate Group Beta Strep (GBS) Prophylaxis Protocol, If Criteria Met; Standing  -     Position Change - For Intra-Uterine Resusitation for Hypertonus, HyperStimulation or Non-Reassuring Fetal Status; Standing  -     NPO Diet NPO Type: Ice Chips; Standing  -     Inpatient Anesthesiology Consult; Standing  -     CBC (No Diff); Standing  -     T Pallidum Antibody w/ reflex RPR (Syphilis); Standing  -     Type & Screen; Standing  -     Insert Peripheral IV; Standing  -     Saline Lock & Maintain IV Access; Standing  -     Notify Physician (specified); Standing  -     Vital Signs Per Hospital Policy; Standing  -     Up Ad Gem; Standing  -     Strict Intake and Output; Standing  -     Fundal & Lochia Check; Standing  -     Fundal & Lochia Check; Standing  -     Apply Ice to Perineum; Standing  -     Bladder Assessment; Standing  -     Diet: Regular/House; Fluid Consistency: Thin (IDDSI 0); Standing  -     Place  Sequential Compression Device; Standing  -     Maintain Sequential Compression Device; Standing

## 2024-06-11 ENCOUNTER — HOSPITAL ENCOUNTER (OUTPATIENT)
Facility: HOSPITAL | Age: 24
Discharge: HOME | End: 2024-06-11
Attending: OBSTETRICS & GYNECOLOGY | Admitting: OBSTETRICS & GYNECOLOGY
Payer: COMMERCIAL

## 2024-06-11 VITALS
DIASTOLIC BLOOD PRESSURE: 74 MMHG | HEART RATE: 70 BPM | RESPIRATION RATE: 18 BRPM | SYSTOLIC BLOOD PRESSURE: 118 MMHG | TEMPERATURE: 98.4 F | HEIGHT: 60 IN | WEIGHT: 212 LBS | BODY MASS INDEX: 41.62 KG/M2

## 2024-06-11 PROBLEM — Z34.90 PREGNANCY: Status: ACTIVE | Noted: 2024-06-11

## 2024-06-11 PROCEDURE — G0378 HOSPITAL OBSERVATION PER HR: HCPCS

## 2024-06-11 PROCEDURE — G0463 HOSPITAL OUTPT CLINIC VISIT: HCPCS

## 2024-06-11 NOTE — PROGRESS NOTES
Quiana Baltazar  : 2000  MRN: [id number]  CSN: [id number]    Labor & Delivery [facility] progress note    Subjective   Chief Complaint: She reports abdominal pain - thinks maybe contractions    HPI: Already scheduled for induction, presents today with possible contractions    History:    Prenatal Information:  Prenatal Results       Initial Prenatal Labs       Test Value Reference Range Date Time    Hemoglobin  12.3 g/dL 12.0 - 15.9 23 1507    Hematocrit  35.7 % 34.0 - 46.6 23 1507    Platelets  254 10*3/mm3 140 - 450 23 1507    Rubella IgG  2.23 index Immune >0.99 23 1507    Hepatitis B SAg  Negative  Negative 23 1507    Hepatitis C Ab        RPR  Non Reactive  Non Reactive 24 1019       Non Reactive  Non Reactive 23 1507    T. Pallidum Ab         ABO  O   23 1507    Rh  Positive   23 1507    Antibody Screen  Negative  Negative 23 1507    HIV  Non Reactive  Non Reactive 23 1507    Urine Culture  Final report   23 1507    Gonorrhea  Negative  Negative 24 1220       Negative  Negative 24 1233       Negative  Negative 23 1507    Chlamydia  Negative  Negative 24 1220       Negative  Negative 24 1233       Negative  Negative 23 1507    TSH        HgB A1c   5.40 % 4.80 - 5.60 23 1507    Varicella IgG  627 index Immune >165 23 1507    HgB Electrophoresis         Hemoglobinopathy (genetic testing)        Cystic fibrosis                   Fetal testing        Test Value Reference Range Date Time    NIPT        MSAFP        AFP-4                  2nd and 3rd Trimester       Test Value Reference Range Date Time    Hemoglobin (repeated)  10.7 g/dL 12.0 - 15.9 24 1019    Hematocrit (repeated)  32.5 % 34.0 - 46.6 24 1019    Platelets   246 10*3/mm3 140 - 450 24 1019       254 10*3/mm3 140 - 450 23 1507    1 hour GTT   110 mg/dL 65 - 139 24 1019    Antibody Screen  (repeated)        3rd TM syphilis scrn (repeated)  RPR   Non Reactive  Non Reactive 04/17/24 1019    3rd TM syphilis scrn (repeated) FTA        GTT Fasting        GTT 1 Hr        GTT 2 Hr        GTT 3 Hr        Group B Strep  Positive  Negative 05/29/24 1220              Other testing        Test Value Reference Range Date Time    Parvo IgG         CMV IgG                   Drug Screening       Test Value Reference Range Date Time    Amphetamine Screen        Barbiturate Screen        Benzodiazepine Screen        Methadone Screen        Phencyclidine Screen        Opiates Screen        THC Screen        Cocaine Screen        Propoxyphene Screen        Buprenorphine Screen        Methamphetamine Screen        Oxycodone Screen        Tricyclic Antidepressants Screen                  Legend    ^: Historical                          External Prenatal Results       Pregnancy Outside Results - Transcribed From Office Records - See Scanned Records For Details       Test Value Date Time    ABO  O  12/27/23 1507    Rh  Positive  12/27/23 1507    Antibody Screen  Negative  12/27/23 1507    Varicella IgG  627 index 12/27/23 1507    Rubella  2.23 index 12/27/23 1507    Hgb  10.7 g/dL 04/17/24 1019       12.3 g/dL 12/27/23 1507    Hct  32.5 % 04/17/24 1019       35.7 % 12/27/23 1507    HgB A1c   5.40 % 12/27/23 1507    1h GTT  110 mg/dL 04/17/24 1019    3h GTT Fasting       3h GTT 1 hour       3h GTT 2 hour       3h GTT 3 hour        Gonorrhea (discrete)  Negative  05/29/24 1220       Negative  03/20/24 1233       Negative  12/27/23 1507    Chlamydia (discrete)  Negative  05/29/24 1220       Negative  03/20/24 1233       Negative  12/27/23 1507    RPR  Non Reactive  04/17/24 1019       Non Reactive  12/27/23 1507    Syphilis Antibody       HBsAg  Negative  12/27/23 1507    Herpes Simplex Virus PCR       Herpes Simplex VIrus Culture       HIV  Non Reactive  12/27/23 1507    Hep C RNA Quant PCR       Hep C Antibody       AFP        NIPT       Cystic Fibroisis        Group B Strep  Positive  24 1220    GBS Susceptibility to Clindamycin       GBS Susceptibility to Erythromycin       Fetal Fibronectin       Genetic Testing, Maternal Blood                 Drug Screening       Test Value Date Time    Urine Drug Screen       Amphetamine Screen       Barbiturate Screen       Benzodiazepine Screen       Methadone Screen       Phencyclidine Screen       Opiates Screen       THC Screen       Cocaine Screen       Propoxyphene Screen       Buprenorphine Screen       Methamphetamine Screen       Oxycodone Screen       Tricyclic Antidepressants Screen                 Legend    ^: Historical                             Past OB History:     OB History    Para Term  AB Living   2 1 1 0 0 1   SAB IAB Ectopic Molar Multiple Live Births   0 0 0 0 0 1      # Outcome Date GA Lbr Fredis/2nd Weight Sex Type Anes PTL Lv   2 Current            1 Term 22 40w3d 13:32 / 00:43 3220 g (7 lb 1.6 oz) F Vag-Spont EPI N MAYCOL      Name: DEVIKA FERRERA      Apgar1: 7  Apgar5: 9       Past Medical History: Past Medical History:   Diagnosis Date    Allergic     Anxiety     Asthma     Depression     Multiple hereditary exostoses     Smoker       Past Surgical History Past Surgical History:   Procedure Laterality Date    FEMUR OSTEOCHONDROMA Right     KNEE SURGERY Bilateral 2015    both knees     WISDOM TOOTH EXTRACTION        Family History: Family History   Problem Relation Age of Onset    COPD Mother     Asthma Mother     Diabetes Father     Hypertension Father     Cirrhosis Father     Heart attack Paternal Grandfather       Social History:  reports that she has been smoking cigarettes. She has a 1 pack-year smoking history. She has never used smokeless tobacco.   reports that she does not currently use alcohol.   reports no history of drug use.           High Risk Medical Conditions/Complaints this visit: possible contractions    Discussion of Management  or Test Interpretation with physcian/provider/healthcare provider: No    External Records Reviewed: Prenatal history in [facility] from [facility] OB provider - ZAC Downing ultrasound reviewed, pregnancy complicated by: obesity, anemia, drug use, tobacco use    Review Previous Test Results: Prenatal labs in [facility] reviewed, history of:  GBS+    Independent Historian: none    Social Determinants to Health: No drug, transportation or housing or other issues noted - no       Objective   Medical Decision Making:  Amount/Complexity of Data Reviewed  -Labs ordered - no  -Imaging ordered - no  -IV fluids given - no  Risk:  Prescription drug management - n/a  Drug therapy requiring intensive monitoring for toxicity - n/a  Decision to admit patient - no  Diagnosis/Treatment limited by social determinants - no    Min/max vitals past 24 hours:  Temp  Min: 98.4 °F (36.9 °C)  Max: 98.4 °F (36.9 °C)   BP  Min: 143/93  Max: 143/93   Pulse  Min: 92  Max: 92   Resp  Min: 18  Max: 18        Independent Interpretation NST/FHT's: reassuring and category 1.  external monitors used   Cervix: was checked (by RN): 0-1 cm / 20 % / -3   Contractions:    Review of current test: results none    N/a       Final Diagnoses: No evidence of contractions       Assessment   IUP at 38w2d  No contractions     Plan   OK for discharge    Cheyenne Solis MD  6/11/2024  14:49 CDT

## 2024-06-12 ENCOUNTER — ROUTINE PRENATAL (OUTPATIENT)
Dept: OBSTETRICS AND GYNECOLOGY | Age: 24
End: 2024-06-12
Payer: COMMERCIAL

## 2024-06-12 VITALS — WEIGHT: 210 LBS | BODY MASS INDEX: 41.01 KG/M2 | DIASTOLIC BLOOD PRESSURE: 86 MMHG | SYSTOLIC BLOOD PRESSURE: 124 MMHG

## 2024-06-12 DIAGNOSIS — Z3A.38 38 WEEKS GESTATION OF PREGNANCY: Primary | ICD-10-CM

## 2024-06-12 DIAGNOSIS — O99.330 TOBACCO USE DURING PREGNANCY, ANTEPARTUM: ICD-10-CM

## 2024-06-12 DIAGNOSIS — Z34.83 MULTIGRAVIDA IN THIRD TRIMESTER: ICD-10-CM

## 2024-06-12 LAB
GLUCOSE UR STRIP-MCNC: NEGATIVE MG/DL
PROT UR STRIP-MCNC: NEGATIVE MG/DL

## 2024-06-12 NOTE — H&P (VIEW-ONLY)
[facility]  HISTORY AND PHYSICAL, OBGYN    Patient Name: Breann Ferrera  : 2000  MRN: [id number]  Primary Care Physician:  Provider, No Known  Date of admission: (Not on file)    Subjective   Subjective     Chief Complaint:   Chief Complaint   Patient presents with    Routine Prenatal Visit     Patient here for routine obv, patient denies contractions, vaginal bleeding, leakage of fluid. Patient reports good fetal movement.   Denies questions or concerns.        HPI: Breann Ferrera is a 24 y.o. year old  with an 2024, by Ultrasound presenting for elective induction of labor at 39 weeks gestation.     Prenatal care has been with José Miguel Downing MD  It has been noteworthy for:  Maternal obesity  Tobacco use in pregnancy  GBS positive  THC use in pregnancy  Asthma pregnancy  Multiple hereditary exostoses - did well last pregnancy with epidural     ROS:  All systems were reviewed and negative except for:   -endorses appropriate fetal movement  -denies regular contractions, leakage of fluid or vaginal bleeding    Personal History     OB History    Para Term  AB Living   2 1 1 0 0 1   SAB IAB Ectopic Molar Multiple Live Births   0 0 0 0 0 1      # Outcome Date GA Lbr Fredis/2nd Weight Sex Type Anes PTL Lv   2 Current            1 Term 22 40w3d 13:32 / 00:43 3220 g (7 lb 1.6 oz) F Vag-Spont EPI N MAYCOL      Name: DEVIKA FERRERA      Apgar1: 7  Apgar5: 9       Past Medical History:   Diagnosis Date    Allergic     Anxiety     Asthma     Depression     Multiple hereditary exostoses     Smoker        Past Surgical History:   Procedure Laterality Date    FEMUR OSTEOCHONDROMA Right     KNEE SURGERY Bilateral 2015    both knees     WISDOM TOOTH EXTRACTION         Family History: family history includes Asthma in her mother; COPD in her mother; Cirrhosis in her father; Diabetes in her father; Heart attack in her paternal grandfather; Hypertension in her father. Otherwise  pertinent FHx was reviewed and not pertinent to current issue.    Social History:  reports that she has been smoking cigarettes. She has a 1 pack-year smoking history. She has never used smokeless tobacco. She reports that she does not currently use alcohol. She reports that she does not use drugs.    Home Medications:  albuterol sulfate HFA, cetirizine, famotidine, ondansetron, and prenatal vitamin    Allergies:  No Known Allergies    Objective   Objective     Vitals:   Temp:  [98.4 °F (36.9 °C)] 98.4 °F (36.9 °C)  Heart Rate:  [70-92] 70  Resp:  [18] 18  BP: (118-143)/(74-93) 124/86    Physical Exam     General: Well Developed, Well Nourished, not in acute distress   HEENT: normocephalic, atraumatic, conjunctiva non-icteric    Respiratory: non-labored, symmetrical chest rise   Gastrointestinal: gravid, soft, no TTP, no rebound tenderness or guarding to palpation, no palpable ctx   Neurologic: alert and oriented, CN II-XII grossly intact without focal deficit, DTRs 2+ lower extremities, no clonus   Psychiatric: normal mood, pleasant, cooperative  Musculoskeletal: negative calf tenderness, no lower extremity edema  Skin: warm & dry, no cyanosis, no jaundice    Imaging  :  Intrauterine pregnancy at 32w3d  Placental location: Anterior  Normal Interval growth  EFW:30%ile, AC:50%ile  Estimated fetal weight:  1902 g  Fetal position: Vertex  YULI: 14 cm  DVP: 6.4 cm  Fetal heart rate: 127    Prenatal Labs  Lab Results   Component Value Date    HGB 10.7 (L) 2024    RUBELLAABIGG 2.23 2023    HEPBSAG Negative 2023    ABSCRN Negative 2023    [id number] Non Reactive 2023    HEPCVIRUSABY Non-Reactive 2021     2024    NMR2LXLR 94 2022    STREPGPB Positive (A) 2024    URINECX Final report 2023    CHLAMNAA Negative 2024    NGONORRHON Negative 2024         Assessment & Plan   Assessment / Plan     Breann Baltazar is a 24 y.o. year old  with an  2024, by Ultrasound presenting for induction of labor, elective.    Pregnancy  Maternal obesity  Tobacco use in pregnancy  GBS positive  THC use in pregnancy  Asthma pregnancy  Multiple hereditary exostoses - did well last pregnancy with epidural     PLAN  -admit to L&D  -admit labs  -AROM PRN  -pitocin per protocol  -epidural at patient request  -PCN for GBS prophylaxis  -plan for     Risks and benefits of induction discussed. Patient understands that IOL increases the risk of  delivery over spontaneous labor, especially if the patient does not have a favorable cervix.       José Miguel Downing MD  2024  11:41 CDT

## 2024-06-12 NOTE — H&P
[facility]  HISTORY AND PHYSICAL, OBGYN    Patient Name: Breann Ferrera  : 2000  MRN: [id number]  Primary Care Physician:  Provider, No Known  Date of admission: (Not on file)    Subjective   Subjective     Chief Complaint:   Chief Complaint   Patient presents with    Routine Prenatal Visit     Patient here for routine obv, patient denies contractions, vaginal bleeding, leakage of fluid. Patient reports good fetal movement.   Denies questions or concerns.        HPI: Breann Ferrera is a 24 y.o. year old  with an 2024, by Ultrasound presenting for elective induction of labor at 39 weeks gestation.     Prenatal care has been with José Miguel Downing MD  It has been noteworthy for:  Maternal obesity  Tobacco use in pregnancy  GBS positive  THC use in pregnancy  Asthma pregnancy  Multiple hereditary exostoses - did well last pregnancy with epidural     ROS:  All systems were reviewed and negative except for:   -endorses appropriate fetal movement  -denies regular contractions, leakage of fluid or vaginal bleeding    Personal History     OB History    Para Term  AB Living   2 1 1 0 0 1   SAB IAB Ectopic Molar Multiple Live Births   0 0 0 0 0 1      # Outcome Date GA Lbr Fredis/2nd Weight Sex Type Anes PTL Lv   2 Current            1 Term 22 40w3d 13:32 / 00:43 3220 g (7 lb 1.6 oz) F Vag-Spont EPI N MAYCOL      Name: DEVIKA FERRERA      Apgar1: 7  Apgar5: 9       Past Medical History:   Diagnosis Date    Allergic     Anxiety     Asthma     Depression     Multiple hereditary exostoses     Smoker        Past Surgical History:   Procedure Laterality Date    FEMUR OSTEOCHONDROMA Right     KNEE SURGERY Bilateral 2015    both knees     WISDOM TOOTH EXTRACTION         Family History: family history includes Asthma in her mother; COPD in her mother; Cirrhosis in her father; Diabetes in her father; Heart attack in her paternal grandfather; Hypertension in her father. Otherwise  pertinent FHx was reviewed and not pertinent to current issue.    Social History:  reports that she has been smoking cigarettes. She has a 1 pack-year smoking history. She has never used smokeless tobacco. She reports that she does not currently use alcohol. She reports that she does not use drugs.    Home Medications:  albuterol sulfate HFA, cetirizine, famotidine, ondansetron, and prenatal vitamin    Allergies:  No Known Allergies    Objective   Objective     Vitals:   Temp:  [98.4 °F (36.9 °C)] 98.4 °F (36.9 °C)  Heart Rate:  [70-92] 70  Resp:  [18] 18  BP: (118-143)/(74-93) 124/86    Physical Exam     General: Well Developed, Well Nourished, not in acute distress   HEENT: normocephalic, atraumatic, conjunctiva non-icteric    Respiratory: non-labored, symmetrical chest rise   Gastrointestinal: gravid, soft, no TTP, no rebound tenderness or guarding to palpation, no palpable ctx   Neurologic: alert and oriented, CN II-XII grossly intact without focal deficit, DTRs 2+ lower extremities, no clonus   Psychiatric: normal mood, pleasant, cooperative  Musculoskeletal: negative calf tenderness, no lower extremity edema  Skin: warm & dry, no cyanosis, no jaundice    Imaging  :  Intrauterine pregnancy at 32w3d  Placental location: Anterior  Normal Interval growth  EFW:30%ile, AC:50%ile  Estimated fetal weight:  1902 g  Fetal position: Vertex  YULI: 14 cm  DVP: 6.4 cm  Fetal heart rate: 127    Prenatal Labs  Lab Results   Component Value Date    HGB 10.7 (L) 2024    RUBELLAABIGG 2.23 2023    HEPBSAG Negative 2023    ABSCRN Negative 2023    [id number] Non Reactive 2023    HEPCVIRUSABY Non-Reactive 2021     2024    MHE9QOGY 94 2022    STREPGPB Positive (A) 2024    URINECX Final report 2023    CHLAMNAA Negative 2024    NGONORRHON Negative 2024         Assessment & Plan   Assessment / Plan     Breann Baltazar is a 24 y.o. year old  with an  2024, by Ultrasound presenting for induction of labor, elective.    Pregnancy  Maternal obesity  Tobacco use in pregnancy  GBS positive  THC use in pregnancy  Asthma pregnancy  Multiple hereditary exostoses - did well last pregnancy with epidural     PLAN  -admit to L&D  -admit labs  -AROM PRN  -pitocin per protocol  -epidural at patient request  -PCN for GBS prophylaxis  -plan for     Risks and benefits of induction discussed. Patient understands that IOL increases the risk of  delivery over spontaneous labor, especially if the patient does not have a favorable cervix.       José Miguel Downing MD  2024  11:41 CDT

## 2024-06-17 ENCOUNTER — ANESTHESIA (OUTPATIENT)
Dept: LABOR AND DELIVERY | Facility: HOSPITAL | Age: 24
End: 2024-06-17
Payer: COMMERCIAL

## 2024-06-17 ENCOUNTER — ANESTHESIA EVENT (OUTPATIENT)
Dept: LABOR AND DELIVERY | Facility: HOSPITAL | Age: 24
End: 2024-06-17
Payer: COMMERCIAL

## 2024-06-17 ENCOUNTER — HOSPITAL ENCOUNTER (OUTPATIENT)
Dept: LABOR AND DELIVERY | Facility: HOSPITAL | Age: 24
Discharge: HOME | End: 2024-06-17
Payer: COMMERCIAL

## 2024-06-17 ENCOUNTER — HOSPITAL ENCOUNTER (INPATIENT)
Facility: HOSPITAL | Age: 24
LOS: 3 days | Discharge: HOME | End: 2024-06-20
Attending: OBSTETRICS & GYNECOLOGY | Admitting: OBSTETRICS & GYNECOLOGY
Payer: COMMERCIAL

## 2024-06-17 DIAGNOSIS — Z3A.37 37 WEEKS GESTATION OF PREGNANCY: ICD-10-CM

## 2024-06-17 PROBLEM — Z34.90 ENCOUNTER FOR ELECTIVE INDUCTION OF LABOR: Status: ACTIVE | Noted: 2024-06-17

## 2024-06-17 LAB
ABO GROUP BLD: NORMAL
AMPHET+METHAMPHET UR QL: NEGATIVE
AMPHETAMINES UR QL: NEGATIVE
BARBITURATES UR QL SCN: NEGATIVE
BENZODIAZ UR QL SCN: NEGATIVE
BLD GP AB SCN SERPL QL: NEGATIVE
BUPRENORPHINE SERPL-MCNC: NEGATIVE NG/ML
CANNABINOIDS SERPL QL: POSITIVE
COCAINE UR QL: NEGATIVE
DEPRECATED RDW RBC AUTO: 44.3 FL (ref 37–54)
ERYTHROCYTE [DISTWIDTH] IN BLOOD BY AUTOMATED COUNT: 14.6 % (ref 12.3–15.4)
FENTANYL UR-MCNC: NEGATIVE NG/ML
HCT VFR BLD AUTO: 32.6 % (ref 34–46.6)
HGB BLD-MCNC: 10.9 G/DL (ref 12–15.9)
MCH RBC QN AUTO: 28.3 PG (ref 26.6–33)
MCHC RBC AUTO-ENTMCNC: 33.4 G/DL (ref 31.5–35.7)
MCV RBC AUTO: 84.7 FL (ref 79–97)
METHADONE UR QL SCN: NEGATIVE
OPIATES UR QL: NEGATIVE
OXYCODONE UR QL SCN: NEGATIVE
PCP UR QL SCN: NEGATIVE
PLATELET # BLD AUTO: 234 10*3/MM3 (ref 140–450)
PMV BLD AUTO: 11.7 FL (ref 6–12)
RBC # BLD AUTO: 3.85 10*6/MM3 (ref 3.77–5.28)
RH BLD: POSITIVE
T PALLIDUM IGG SER QL: NORMAL
T&S EXPIRATION DATE: NORMAL
TRICYCLICS UR QL SCN: NEGATIVE
WBC NRBC COR # BLD AUTO: 9.02 10*3/MM3 (ref 3.4–10.8)

## 2024-06-17 PROCEDURE — 25010000002 ROPIVACAINE PER 1 MG: Performed by: NURSE ANESTHETIST, CERTIFIED REGISTERED

## 2024-06-17 PROCEDURE — 86780 TREPONEMA PALLIDUM: CPT | Performed by: OBSTETRICS & GYNECOLOGY

## 2024-06-17 PROCEDURE — 25010000002 PENICILLIN G POTASSIUM PER 600000 UNITS: Performed by: OBSTETRICS & GYNECOLOGY

## 2024-06-17 PROCEDURE — 25810000003 LACTATED RINGERS SOLUTION: Performed by: OBSTETRICS & GYNECOLOGY

## 2024-06-17 PROCEDURE — G0480 DRUG TEST DEF 1-7 CLASSES: HCPCS | Performed by: OBSTETRICS & GYNECOLOGY

## 2024-06-17 PROCEDURE — 25810000003 LACTATED RINGERS PER 1000 ML: Performed by: OBSTETRICS & GYNECOLOGY

## 2024-06-17 PROCEDURE — 86901 BLOOD TYPING SEROLOGIC RH(D): CPT | Performed by: OBSTETRICS & GYNECOLOGY

## 2024-06-17 PROCEDURE — 51702 INSERT TEMP BLADDER CATH: CPT

## 2024-06-17 PROCEDURE — 10907ZC DRAINAGE OF AMNIOTIC FLUID, THERAPEUTIC FROM PRODUCTS OF CONCEPTION, VIA NATURAL OR ARTIFICIAL OPENING: ICD-10-PCS | Performed by: OBSTETRICS & GYNECOLOGY

## 2024-06-17 PROCEDURE — 80307 DRUG TEST PRSMV CHEM ANLYZR: CPT | Performed by: OBSTETRICS & GYNECOLOGY

## 2024-06-17 PROCEDURE — 85027 COMPLETE CBC AUTOMATED: CPT | Performed by: OBSTETRICS & GYNECOLOGY

## 2024-06-17 PROCEDURE — 86900 BLOOD TYPING SEROLOGIC ABO: CPT | Performed by: OBSTETRICS & GYNECOLOGY

## 2024-06-17 PROCEDURE — 86850 RBC ANTIBODY SCREEN: CPT | Performed by: OBSTETRICS & GYNECOLOGY

## 2024-06-17 PROCEDURE — 25010000002 FENTANYL CITRATE (PF) 250 MCG/5ML SOLUTION: Performed by: NURSE ANESTHETIST, CERTIFIED REGISTERED

## 2024-06-17 PROCEDURE — 3E033VJ INTRODUCTION OF OTHER HORMONE INTO PERIPHERAL VEIN, PERCUTANEOUS APPROACH: ICD-10-PCS | Performed by: OBSTETRICS & GYNECOLOGY

## 2024-06-17 PROCEDURE — 25010000002 ONDANSETRON PER 1 MG: Performed by: OBSTETRICS & GYNECOLOGY

## 2024-06-17 PROCEDURE — 3E0DXGC INTRODUCTION OF OTHER THERAPEUTIC SUBSTANCE INTO MOUTH AND PHARYNX, EXTERNAL APPROACH: ICD-10-PCS | Performed by: OBSTETRICS & GYNECOLOGY

## 2024-06-17 PROCEDURE — C1755 CATHETER, INTRASPINAL: HCPCS | Performed by: NURSE ANESTHETIST, CERTIFIED REGISTERED

## 2024-06-17 RX ORDER — ONDANSETRON 4 MG/1
4 TABLET, ORALLY DISINTEGRATING ORAL EVERY 6 HOURS PRN
Status: DISCONTINUED | OUTPATIENT
Start: 2024-06-17 | End: 2024-06-18 | Stop reason: HOSPADM

## 2024-06-17 RX ORDER — FAMOTIDINE 10 MG/ML
20 INJECTION, SOLUTION INTRAVENOUS ONCE AS NEEDED
OUTPATIENT
Start: 2024-06-17

## 2024-06-17 RX ORDER — ROPIVACAINE HYDROCHLORIDE 5 MG/ML
INJECTION, SOLUTION EPIDURAL; INFILTRATION; PERINEURAL AS NEEDED
Status: DISCONTINUED | OUTPATIENT
Start: 2024-06-17 | End: 2024-06-18 | Stop reason: SURG

## 2024-06-17 RX ORDER — OXYTOCIN/0.9 % SODIUM CHLORIDE 30/500 ML
250 PLASTIC BAG, INJECTION (ML) INTRAVENOUS CONTINUOUS
Status: ACTIVE | OUTPATIENT
Start: 2024-06-17 | End: 2024-06-17

## 2024-06-17 RX ORDER — SODIUM CHLORIDE 9 MG/ML
40 INJECTION, SOLUTION INTRAVENOUS AS NEEDED
Status: DISCONTINUED | OUTPATIENT
Start: 2024-06-17 | End: 2024-06-18 | Stop reason: HOSPADM

## 2024-06-17 RX ORDER — ACETAMINOPHEN 325 MG/1
650 TABLET ORAL EVERY 4 HOURS PRN
Status: DISCONTINUED | OUTPATIENT
Start: 2024-06-17 | End: 2024-06-18 | Stop reason: HOSPADM

## 2024-06-17 RX ORDER — ROPIVACAINE HYDROCHLORIDE 2 MG/ML
INJECTION, SOLUTION EPIDURAL; INFILTRATION; PERINEURAL AS NEEDED
Status: DISCONTINUED | OUTPATIENT
Start: 2024-06-17 | End: 2024-06-18 | Stop reason: SURG

## 2024-06-17 RX ORDER — OXYTOCIN/0.9 % SODIUM CHLORIDE 30/500 ML
2-20 PLASTIC BAG, INJECTION (ML) INTRAVENOUS
Status: DISCONTINUED | OUTPATIENT
Start: 2024-06-17 | End: 2024-06-18 | Stop reason: HOSPADM

## 2024-06-17 RX ORDER — SODIUM CHLORIDE, SODIUM LACTATE, POTASSIUM CHLORIDE, CALCIUM CHLORIDE 600; 310; 30; 20 MG/100ML; MG/100ML; MG/100ML; MG/100ML
125 INJECTION, SOLUTION INTRAVENOUS CONTINUOUS
Status: DISCONTINUED | OUTPATIENT
Start: 2024-06-17 | End: 2024-06-18

## 2024-06-17 RX ORDER — MORPHINE SULFATE 2 MG/ML
2 INJECTION, SOLUTION INTRAMUSCULAR; INTRAVENOUS
Status: DISCONTINUED | OUTPATIENT
Start: 2024-06-17 | End: 2024-06-18 | Stop reason: HOSPADM

## 2024-06-17 RX ORDER — MISOPROSTOL 100 MCG
25 TABLET ORAL ONCE
Status: COMPLETED | OUTPATIENT
Start: 2024-06-17 | End: 2024-06-17

## 2024-06-17 RX ORDER — FENTANYL CITRATE 50 UG/ML
INJECTION, SOLUTION INTRAMUSCULAR; INTRAVENOUS AS NEEDED
Status: DISCONTINUED | OUTPATIENT
Start: 2024-06-17 | End: 2024-06-18 | Stop reason: SURG

## 2024-06-17 RX ORDER — CITRIC ACID/SODIUM CITRATE 334-500MG
30 SOLUTION, ORAL ORAL ONCE AS NEEDED
Status: DISCONTINUED | OUTPATIENT
Start: 2024-06-17 | End: 2024-06-18 | Stop reason: HOSPADM

## 2024-06-17 RX ORDER — PENICILLIN G 3000000 [IU]/50ML
3 INJECTION, SOLUTION INTRAVENOUS EVERY 4 HOURS
Status: DISCONTINUED | OUTPATIENT
Start: 2024-06-17 | End: 2024-06-18 | Stop reason: HOSPADM

## 2024-06-17 RX ORDER — EPHEDRINE SULFATE 50 MG/ML
10 INJECTION, SOLUTION INTRAVENOUS
Status: DISCONTINUED | OUTPATIENT
Start: 2024-06-17 | End: 2024-06-18 | Stop reason: HOSPADM

## 2024-06-17 RX ORDER — TERBUTALINE SULFATE 1 MG/ML
0.25 INJECTION, SOLUTION SUBCUTANEOUS AS NEEDED
Status: DISCONTINUED | OUTPATIENT
Start: 2024-06-17 | End: 2024-06-18 | Stop reason: HOSPADM

## 2024-06-17 RX ORDER — SODIUM CHLORIDE 0.9 % (FLUSH) 0.9 %
10 SYRINGE (ML) INJECTION AS NEEDED
Status: DISCONTINUED | OUTPATIENT
Start: 2024-06-17 | End: 2024-06-18 | Stop reason: HOSPADM

## 2024-06-17 RX ORDER — OXYTOCIN/0.9 % SODIUM CHLORIDE 30/500 ML
999 PLASTIC BAG, INJECTION (ML) INTRAVENOUS ONCE
Status: COMPLETED | OUTPATIENT
Start: 2024-06-17 | End: 2024-06-18

## 2024-06-17 RX ORDER — CITRIC ACID/SODIUM CITRATE 334-500MG
30 SOLUTION, ORAL ORAL ONCE
Status: DISCONTINUED | OUTPATIENT
Start: 2024-06-17 | End: 2024-06-18 | Stop reason: HOSPADM

## 2024-06-17 RX ORDER — SODIUM CHLORIDE 0.9 % (FLUSH) 0.9 %
10 SYRINGE (ML) INJECTION EVERY 12 HOURS SCHEDULED
Status: DISCONTINUED | OUTPATIENT
Start: 2024-06-17 | End: 2024-06-18 | Stop reason: HOSPADM

## 2024-06-17 RX ORDER — ONDANSETRON 2 MG/ML
4 INJECTION INTRAMUSCULAR; INTRAVENOUS EVERY 6 HOURS PRN
Status: DISCONTINUED | OUTPATIENT
Start: 2024-06-17 | End: 2024-06-18 | Stop reason: HOSPADM

## 2024-06-17 RX ADMIN — EPHEDRINE SULFATE 10 MG: 50 INJECTION INTRAVENOUS at 18:04

## 2024-06-17 RX ADMIN — PENICILLIN G 3 MILLION UNITS: 3000000 INJECTION, SOLUTION INTRAVENOUS at 11:21

## 2024-06-17 RX ADMIN — PENICILLIN G 3 MILLION UNITS: 3000000 INJECTION, SOLUTION INTRAVENOUS at 19:25

## 2024-06-17 RX ADMIN — ROPIVACAINE HYDROCHLORIDE 5 ML: 2 INJECTION, SOLUTION EPIDURAL; INFILTRATION at 13:40

## 2024-06-17 RX ADMIN — SODIUM CHLORIDE, POTASSIUM CHLORIDE, SODIUM LACTATE AND CALCIUM CHLORIDE 125 ML/HR: 600; 310; 30; 20 INJECTION, SOLUTION INTRAVENOUS at 06:34

## 2024-06-17 RX ADMIN — PENICILLIN G 3 MILLION UNITS: 3000000 INJECTION, SOLUTION INTRAVENOUS at 23:48

## 2024-06-17 RX ADMIN — FENTANYL CITRATE 50 MCG: 0.05 INJECTION, SOLUTION INTRAMUSCULAR; INTRAVENOUS at 13:40

## 2024-06-17 RX ADMIN — PENICILLIN G 3 MILLION UNITS: 3000000 INJECTION, SOLUTION INTRAVENOUS at 15:36

## 2024-06-17 RX ADMIN — FENTANYL CITRATE 200 MCG: 0.05 INJECTION, SOLUTION INTRAMUSCULAR; INTRAVENOUS at 13:48

## 2024-06-17 RX ADMIN — SODIUM CHLORIDE, POTASSIUM CHLORIDE, SODIUM LACTATE AND CALCIUM CHLORIDE 125 ML/HR: 600; 310; 30; 20 INJECTION, SOLUTION INTRAVENOUS at 15:36

## 2024-06-17 RX ADMIN — SODIUM CHLORIDE 5 MILLION UNITS: 900 INJECTION INTRAVENOUS at 07:31

## 2024-06-17 RX ADMIN — EPHEDRINE SULFATE 10 MG: 50 INJECTION INTRAVENOUS at 14:41

## 2024-06-17 RX ADMIN — ONDANSETRON 4 MG: 2 INJECTION INTRAMUSCULAR; INTRAVENOUS at 22:00

## 2024-06-17 RX ADMIN — SODIUM CHLORIDE, POTASSIUM CHLORIDE, SODIUM LACTATE AND CALCIUM CHLORIDE 125 ML/HR: 600; 310; 30; 20 INJECTION, SOLUTION INTRAVENOUS at 14:05

## 2024-06-17 RX ADMIN — ROPIVACAINE HYDROCHLORIDE 5 ML: 5 INJECTION, SOLUTION EPIDURAL; INFILTRATION; PERINEURAL at 13:40

## 2024-06-17 RX ADMIN — Medication 25 MCG: at 07:30

## 2024-06-17 RX ADMIN — SODIUM CHLORIDE, SODIUM LACTATE, POTASSIUM CHLORIDE, AND CALCIUM CHLORIDE 1000 ML: .6; .31; .03; .02 INJECTION, SOLUTION INTRAVENOUS at 13:31

## 2024-06-17 NOTE — ANESTHESIA PREPROCEDURE EVALUATION
Anesthesia Evaluation     Patient summary reviewed   NPO Solid Status: > 8 hours  NPO Liquid Status: > 2 hours           Airway   Mallampati: II  TM distance: >3 FB  Neck ROM: full  Dental - normal exam     Pulmonary - normal exam   (+) asthma,  (-) COPD  Cardiovascular - normal exam  Exercise tolerance: good (4-7 METS)        Neuro/Psych  (+) psychiatric history Anxiety and Depression  GI/Hepatic/Renal/Endo    (+) obesity, morbid obesity    Musculoskeletal (-) negative ROS    Abdominal  - normal exam   Substance History   (+) drug use     OB/GYN    (+) Pregnant        Other - negative ROS                   Anesthesia Plan    ASA 2     epidural       Anesthetic plan, risks, benefits, and alternatives have been provided, discussed and informed consent has been obtained with: patient.    CODE STATUS:    Code Status (Patient has no pulse and is not breathing): CPR (Attempt to Resuscitate)  Medical Interventions (Patient has pulse or is breathing): Full Support

## 2024-06-17 NOTE — ANESTHESIA PROCEDURE NOTES
Labor Epidural      Patient location during procedure: OB  Performed By  CRNA/CAA: Shaggy Carter CRNA  Preanesthetic Checklist  Completed: patient identified, IV checked, site marked, risks and benefits discussed, surgical consent, monitors and equipment checked, pre-op evaluation and timeout performed  Prep:  Pt Position:sitting  Sterile Tech:cap, gloves and sterile barrier  Prep:chlorhexidine gluconate and isopropyl alcohol  Monitoring:blood pressure monitoring and continuous pulse oximetry  Epidural Block Procedure:  Approach:midline  Guidance:palpation technique  Needle Type:Tuohy  Needle Gauge:18 G  Aspiration:negative  Test Dose:negative  Number of Attempts: 1  Post Assessment:  Dressing:occlusive dressing applied and secured with tape  Pt Tolerance:patient tolerated the procedure well with no apparent complications  Complications:no

## 2024-06-17 NOTE — INTERVAL H&P NOTE
H&P updated. The patient was examined and cervix fingertip . NST reactive and reassuring. Category 1. Uterine irritability on toco. Cytotec for cervical ripening then reassess.

## 2024-06-17 NOTE — LACTATION NOTE
"Visit with mother to discuss +THC and plans for breastfeeding. Mother explains she is a chronic user for pain management. After discussion of risks implied with breastfeeding/THC, mother states she is ok with exclusive formula feeding. Reports she did not \"make much milk\" with previous child. Offered plan to pump and dump for 30 days with understanding of decreasing use to 0-2/week per Infant risk center's recommendations. Mother states she is ok with just formula feeding and does not plan to wean. Affirmed plan. Recommend applying compressive bra as soon as possible after delivery. Informed mother lactation staff will return after delivery (either this afternoon or tomorrow) to review further suppression/formula education. Encouragement and support provided.   "

## 2024-06-17 NOTE — PROGRESS NOTES
José Miguel Downing MD  [facility]  [address]  [place] [address]  Office [phone]  Fax [phone]      [facility]  Breann Baltazar  : 2000  MRN: [id number]  CSN: [id number]    Labor progress note    Subjective   She reports is having no problems       Objective   Min/max vitals past 24 hours:  Temp  Min: 97.6 °F (36.4 °C)  Max: 97.6 °F (36.4 °C)   BP  Min: 109/57  Max: 112/60   Pulse  Min: 51  Max: 85   Resp  Min: 18  Max: 18        FHT's: reactive, reassuring and category 1.  external monitors used   Cervix: was checked (by Robyn Nurse Midwife): 3-4 cm / 70 % / -3   Contractions: irregular        Result Review   Results from last 7 days   Lab Units 24  0759   WBC 10*3/mm3 9.02   HEMOGLOBIN g/dL 10.9*   HEMATOCRIT % 32.6*   PLATELETS 10*3/mm3 234     Lab Results   Component Value Date    STREPGPB Positive (A) 2024              Assessment   IUP at 39w1d  Elective induction  Maternal obesity  Tobacco use in pregnancy  GBS positive  THC use in pregnancy  Asthma pregnancy  Multiple hereditary exostoses - did well last pregnancy with epidural      Plan   Augment with pitocin  AROM by Robyn student nurse midwife in my presence - thin meconium fluid seen. Patient tolerated this well.   Allow labor to continue pending maternal and fetal status  Continue ABX for GBS  Plan discussed with family and questions answered.  Understanding verbalized.    José Miguel Downing MD  2024  11:45 CDT

## 2024-06-18 PROBLEM — Z34.90 PREGNANT: Status: RESOLVED | Noted: 2023-12-27 | Resolved: 2024-06-18

## 2024-06-18 PROBLEM — Z34.90 PREGNANCY: Status: RESOLVED | Noted: 2024-06-11 | Resolved: 2024-06-18

## 2024-06-18 PROCEDURE — 59410 OBSTETRICAL CARE: CPT | Performed by: OBSTETRICS & GYNECOLOGY

## 2024-06-18 PROCEDURE — 25810000003 LACTATED RINGERS PER 1000 ML: Performed by: OBSTETRICS & GYNECOLOGY

## 2024-06-18 PROCEDURE — 51702 INSERT TEMP BLADDER CATH: CPT

## 2024-06-18 RX ORDER — HYDROCODONE BITARTRATE AND ACETAMINOPHEN 5; 325 MG/1; MG/1
1 TABLET ORAL EVERY 4 HOURS PRN
Status: DISCONTINUED | OUTPATIENT
Start: 2024-06-18 | End: 2024-06-20 | Stop reason: HOSPADM

## 2024-06-18 RX ORDER — SODIUM CHLORIDE 0.9 % (FLUSH) 0.9 %
1-10 SYRINGE (ML) INJECTION AS NEEDED
Status: DISCONTINUED | OUTPATIENT
Start: 2024-06-18 | End: 2024-06-20 | Stop reason: HOSPADM

## 2024-06-18 RX ORDER — CALCIUM CARBONATE 500 MG/1
2 TABLET, CHEWABLE ORAL 3 TIMES DAILY PRN
Status: DISCONTINUED | OUTPATIENT
Start: 2024-06-18 | End: 2024-06-20 | Stop reason: HOSPADM

## 2024-06-18 RX ORDER — DOCUSATE SODIUM 100 MG/1
100 CAPSULE, LIQUID FILLED ORAL 2 TIMES DAILY
Status: DISCONTINUED | OUTPATIENT
Start: 2024-06-18 | End: 2024-06-20 | Stop reason: HOSPADM

## 2024-06-18 RX ORDER — ONDANSETRON 4 MG/1
4 TABLET, ORALLY DISINTEGRATING ORAL EVERY 8 HOURS PRN
Status: DISCONTINUED | OUTPATIENT
Start: 2024-06-18 | End: 2024-06-20 | Stop reason: HOSPADM

## 2024-06-18 RX ORDER — OXYTOCIN/0.9 % SODIUM CHLORIDE 30/500 ML
125 PLASTIC BAG, INJECTION (ML) INTRAVENOUS ONCE AS NEEDED
Status: DISCONTINUED | OUTPATIENT
Start: 2024-06-18 | End: 2024-06-20 | Stop reason: HOSPADM

## 2024-06-18 RX ORDER — PROMETHAZINE HYDROCHLORIDE 25 MG/1
25 TABLET ORAL EVERY 6 HOURS PRN
Status: DISCONTINUED | OUTPATIENT
Start: 2024-06-18 | End: 2024-06-20 | Stop reason: HOSPADM

## 2024-06-18 RX ORDER — HYDROCODONE BITARTRATE AND ACETAMINOPHEN 10; 325 MG/1; MG/1
1 TABLET ORAL EVERY 4 HOURS PRN
Status: DISCONTINUED | OUTPATIENT
Start: 2024-06-18 | End: 2024-06-20 | Stop reason: HOSPADM

## 2024-06-18 RX ORDER — ONDANSETRON 2 MG/ML
4 INJECTION INTRAMUSCULAR; INTRAVENOUS EVERY 6 HOURS PRN
Status: DISCONTINUED | OUTPATIENT
Start: 2024-06-18 | End: 2024-06-20 | Stop reason: HOSPADM

## 2024-06-18 RX ORDER — NALOXONE HCL 0.4 MG/ML
0.4 VIAL (ML) INJECTION
Status: DISCONTINUED | OUTPATIENT
Start: 2024-06-18 | End: 2024-06-19

## 2024-06-18 RX ORDER — CETIRIZINE HYDROCHLORIDE 10 MG/1
10 TABLET ORAL DAILY
Status: DISCONTINUED | OUTPATIENT
Start: 2024-06-18 | End: 2024-06-20 | Stop reason: HOSPADM

## 2024-06-18 RX ORDER — MISOPROSTOL 200 UG/1
600 TABLET ORAL ONCE AS NEEDED
Status: DISCONTINUED | OUTPATIENT
Start: 2024-06-18 | End: 2024-06-20 | Stop reason: HOSPADM

## 2024-06-18 RX ORDER — METHYLERGONOVINE MALEATE 0.2 MG/ML
200 INJECTION INTRAVENOUS ONCE AS NEEDED
Status: DISCONTINUED | OUTPATIENT
Start: 2024-06-18 | End: 2024-06-20 | Stop reason: HOSPADM

## 2024-06-18 RX ORDER — PROMETHAZINE HYDROCHLORIDE 12.5 MG/1
12.5 SUPPOSITORY RECTAL EVERY 6 HOURS PRN
Status: DISCONTINUED | OUTPATIENT
Start: 2024-06-18 | End: 2024-06-20 | Stop reason: HOSPADM

## 2024-06-18 RX ORDER — HYDROXYZINE HYDROCHLORIDE 25 MG/1
50 TABLET, FILM COATED ORAL NIGHTLY PRN
Status: DISCONTINUED | OUTPATIENT
Start: 2024-06-18 | End: 2024-06-20 | Stop reason: HOSPADM

## 2024-06-18 RX ORDER — PRENATAL VIT/IRON FUM/FOLIC AC 27MG-0.8MG
1 TABLET ORAL DAILY
Status: DISCONTINUED | OUTPATIENT
Start: 2024-06-18 | End: 2024-06-20 | Stop reason: HOSPADM

## 2024-06-18 RX ORDER — ACETAMINOPHEN 325 MG/1
650 TABLET ORAL EVERY 6 HOURS PRN
Status: DISCONTINUED | OUTPATIENT
Start: 2024-06-18 | End: 2024-06-20 | Stop reason: HOSPADM

## 2024-06-18 RX ORDER — BISACODYL 10 MG
10 SUPPOSITORY, RECTAL RECTAL DAILY PRN
Status: DISCONTINUED | OUTPATIENT
Start: 2024-06-19 | End: 2024-06-20 | Stop reason: HOSPADM

## 2024-06-18 RX ORDER — IBUPROFEN 600 MG/1
600 TABLET ORAL EVERY 6 HOURS PRN
Status: DISCONTINUED | OUTPATIENT
Start: 2024-06-18 | End: 2024-06-20 | Stop reason: HOSPADM

## 2024-06-18 RX ORDER — HYDROCORTISONE 25 MG/G
1 CREAM TOPICAL AS NEEDED
Status: DISCONTINUED | OUTPATIENT
Start: 2024-06-18 | End: 2024-06-20 | Stop reason: HOSPADM

## 2024-06-18 RX ORDER — MORPHINE SULFATE 2 MG/ML
1 INJECTION, SOLUTION INTRAMUSCULAR; INTRAVENOUS EVERY 4 HOURS PRN
Status: DISCONTINUED | OUTPATIENT
Start: 2024-06-18 | End: 2024-06-19

## 2024-06-18 RX ADMIN — SODIUM CHLORIDE, POTASSIUM CHLORIDE, SODIUM LACTATE AND CALCIUM CHLORIDE 125 ML/HR: 600; 310; 30; 20 INJECTION, SOLUTION INTRAVENOUS at 01:47

## 2024-06-18 RX ADMIN — PRENATAL VIT W/ FE FUMARATE-FA TAB 27-0.8 MG 1 TABLET: 27-0.8 TAB at 08:45

## 2024-06-18 RX ADMIN — ACETAMINOPHEN 650 MG: 325 TABLET, FILM COATED ORAL at 02:48

## 2024-06-18 RX ADMIN — CETIRIZINE HYDROCHLORIDE 10 MG: 10 TABLET ORAL at 08:46

## 2024-06-18 RX ADMIN — DOCUSATE SODIUM 100 MG: 100 CAPSULE, LIQUID FILLED ORAL at 21:07

## 2024-06-18 RX ADMIN — DOCUSATE SODIUM 100 MG: 100 CAPSULE, LIQUID FILLED ORAL at 08:46

## 2024-06-18 RX ADMIN — Medication 250 ML/HR: at 02:44

## 2024-06-18 NOTE — PLAN OF CARE
Goal Outcome Evaluation:  Plan of Care Reviewed With: patient        Progress: improving  Outcome Evaluation: pt delivered vaginally 0221, boy; no lacerations; PP pitocin infusing

## 2024-06-18 NOTE — PROGRESS NOTES
Tomas Arrington MD  [facility]  [address]  [place] [address]  Office [phone]  Fax [phone]      [facility]  Breann Baltazar  : 2000  MRN: [id number]  CSN: [id number]    Labor progress note    Subjective   She reports is feeling painful contraction, has made slow progress     Objective   Min/max vitals past 24 hours:  Temp  Min: 97.6 °F (36.4 °C)  Max: 98.3 °F (36.8 °C)   BP  Min: 69/52  Max: 124/67   Pulse  Min: 45  Max: 93   Resp  Min: 16  Max: 18        FHT's: reactive and category 1.  external monitors used   Cervix: was checked (by me): 8 cm / 90 % / -1  Fetal position ROP   Contractions: regular every 2-3 minutes      Assessment   IUP at 39w1d  Elective induction     Plan   1.    Decrease pitocin, dose epidural, maternal positioning to try to rotate fetal position  Allow labor to continue pending maternal and fetal status  Plan discussed with family and questions answered.  Understanding verbalized.    Tomas Arrington MD  2024  23:35 CDT

## 2024-06-18 NOTE — L&D DELIVERY NOTE
" [facility]   Vaginal Delivery Note    Patient Name: Breann Baltazar  : 2000  MRN: [id number]    Date of Delivery: 2024     Diagnosis     Pre & Post-Delivery:  Intrauterine pregnancy at 39w2d  Labor status: Induced Onset of Labor     Encounter for elective induction of labor             Problem List    Transfer to Postpartum     Review the Delivery Report for details.     Delivery     Delivery: Vaginal, Spontaneous     YOB: 2023    Time of Birth:  Gestational Age 2:21 AM   39w2d     Anesthesia: Epidural     Delivering clinician: Tomas Arrington    Forceps?   No   Vacuum? No    Shoulder dystocia present: No        Delivery narrative:  Progressed to C/C/+2 and pushed well to deliver a LVIM. CHIDI to LOT vigorous to mom's abdomen. Cord clamping delayed for 60 seconds. Placenta spontaneous and intact with 3VC after IV Pitocin. No lacerations. Epidural anes. EBL 75mL. No complications. Sponge and needle count correct.       Infant     Findings: male  infant     Infant observations: Weight: No birth weight on file.   Length:   in  Observations/Comments:        Apgars: 8  @ 1 minute /    9  @ 5 minutes   Infant Name:      Placenta & Cord         Placenta delivered  Spontaneous  at   2024  2:25 AM     Cord: 3 vessels  present.   Nuchal Cord?  no   Cord blood obtained: Yes    Cord gases obtained:  No    Cord gas results: Venous:  No results found for: \"PHCVEN\", \"BECVEN\"    Arterial:  No results found for: \"PHCART\", \"BECART\"     Repair     Episiotomy: None     No    Lacerations: No   Estimated Blood Loss:       Quantitative Blood Loss:    QBL from VAG DEL: 35 (24 0230)     Complications     none    Disposition     Mother to Mother Baby/Postpartum  in stable condition currently.  Baby to remains with mom  in stable condition currently.    Tomas Arrington MD  24  02:33 CDT        "

## 2024-06-18 NOTE — PLAN OF CARE
Goal Outcome Evaluation:           Progress: improving            VSS. Fundus- firm, midline, U1. Lochia- scant/rubra. Voiding and passing flatus without difficulty. Continuing to ambulate in room. Frequently off the floor.

## 2024-06-18 NOTE — ANESTHESIA POSTPROCEDURE EVALUATION
"Patient: Breann Baltazar    Procedure Summary       Date: 06/17/24 Room / Location:     Anesthesia Start: 1325 Anesthesia Stop: 06/18/24 0221    Procedure: LABOR ANALGESIA Diagnosis:     Scheduled Providers:  Provider: Shaggy Carter CRNA    Anesthesia Type: epidural ASA Status: 2            Anesthesia Type: epidural    Vitals  Vitals Value Taken Time   /58 06/18/24 1210   Temp 97.6 °F (36.4 °C) 06/18/24 1210   Pulse 58 06/18/24 1210   Resp 18 06/18/24 1210   SpO2 99 % 06/18/24 1210           Post Anesthesia Care and Evaluation    Patient location during evaluation: PHASE II  Patient participation: complete - patient participated  Level of consciousness: awake and alert  Pain score: 0  Pain management: adequate    Airway patency: patent  Anesthetic complications: No anesthetic complications  PONV Status: none  Cardiovascular status: acceptable  Respiratory status: acceptable  Hydration status: acceptable  Post Neuraxial Block status: Motor and sensory function returned to baseline and No signs or symptoms of PDPH  Comments: Blood pressure 106/58, pulse 58, temperature 97.6 °F (36.4 °C), temperature source Temporal, resp. rate 18, height 152.4 cm (60\"), weight 97.3 kg (214 lb 6.4 oz), last menstrual period 09/09/2023, SpO2 99%, not currently breastfeeding.        "

## 2024-06-19 LAB
HCT VFR BLD AUTO: 31 % (ref 34–46.6)
HGB BLD-MCNC: 10.2 G/DL (ref 12–15.9)

## 2024-06-19 PROCEDURE — 85018 HEMOGLOBIN: CPT | Performed by: OBSTETRICS & GYNECOLOGY

## 2024-06-19 PROCEDURE — 85014 HEMATOCRIT: CPT | Performed by: OBSTETRICS & GYNECOLOGY

## 2024-06-19 RX ADMIN — DOCUSATE SODIUM 100 MG: 100 CAPSULE, LIQUID FILLED ORAL at 20:49

## 2024-06-19 RX ADMIN — ACETAMINOPHEN 650 MG: 325 TABLET, FILM COATED ORAL at 08:54

## 2024-06-19 RX ADMIN — DOCUSATE SODIUM 100 MG: 100 CAPSULE, LIQUID FILLED ORAL at 08:54

## 2024-06-19 RX ADMIN — CETIRIZINE HYDROCHLORIDE 10 MG: 10 TABLET ORAL at 08:53

## 2024-06-19 RX ADMIN — PRENATAL VIT W/ FE FUMARATE-FA TAB 27-0.8 MG 1 TABLET: 27-0.8 TAB at 08:53

## 2024-06-19 NOTE — PLAN OF CARE
Goal Outcome Evaluation:  Plan of Care Reviewed With: patient        Progress: improving  Outcome Evaluation: VSS, FFML U1, scant rubra, voiding, ambulating, bonding with infant

## 2024-06-19 NOTE — PROGRESS NOTES
"      JUAN Olivas  [facility]  [address]  [place] [address]  Office [phone]  Fax [phone]    [facility]  Vaginal Delivery Progress Note    Subjective   Postpartum Day 1: Vaginal Delivery    The patient feels good this morning.  Her pain is a 4/10 and is somewhat controlled with Tylenol, but she is going to try alternating with Motrin and Tylenol to see if that helps.   She is ambulating well.  Patient describes her bleeding as thin lochia.    She is formula feeding and states that it is going well. Wearing a tight fitting sports bra was discussed.    Objective     Vital Signs Range for the last 24 hours  Temperature: Temp:  [97.6 °F (36.4 °C)-98.3 °F (36.8 °C)] 98.2 °F (36.8 °C)   Temp Source: Temp src: Oral   BP: BP: (106-122)/(56-78) 122/69   Pulse: Heart Rate:  [50-64] 50   Respirations: Resp:  [14-20] 20   SPO2: SpO2:  [97 %-99 %] 99 %   O2 Amount (l/min):     O2 Devices Device (Oxygen Therapy): room air   Weight:       Admit Height:  Height: 152.4 cm (60\")      Physical Exam:  General:  no acute distresss.  Abdomen: abdomen is soft without significant tenderness, masses, organomegaly or guarding. Fundus: appropriate, firm, non tender  Extremities: normal, atraumatic, no cyanosis, and trace edema.   Calves non-tender.    Lab results reviewed:  Yes   Rubella:  No results found for: \"RUBELLAIGGIN\" Nurse Transcribed from prenatal record --  No components found for: \"EXTRUBELQUAL\"  Rh Status:    RH type   Date Value Ref Range Status   06/17/2024 Positive  Corrected     Comment:     PRIOR ABORH RECORDS NOT AVAILABLE FOR VERIFICATION     Immunizations:   Immunization History   Administered Date(s) Administered    DTaP 2001, 2001, 2001, 01/23/2002, 04/25/2002, 05/12/2005    DTaP, Unspecified 2001, 2001, 2001, 01/23/2002, 04/25/2002, 05/12/2005    Flu Vaccine Quad PF >36MO 12/01/2009    Flu Vaccine Split Quad 12/01/2009    H1N1 All Forms 12/01/2009 "    HPV Quadrivalent 05/19/2010, 08/10/2010, 07/12/2011    HPV, Unspecified 05/19/2010, 08/10/2010    Hep A, 2 Dose 02/06/2013, 08/08/2018    Hep B / HiB 2001, 01/23/2002    Hep B, Adolescent or Pediatric 2001    Hepatitis A 02/06/2013, 08/08/2018    Hepatitis B Adult/Adolescent IM 2001, 2001, 01/23/2002    HiB 2001, 2001, 01/23/2002    Hib (PRP-OMP) 2001    IPV 2001, 2001, 01/23/2002, 05/12/2005    MMR 04/25/2002, 05/12/2005    Meningococcal B,(Bexsero) 08/08/2018    Meningococcal Conjugate 02/06/2013, 08/08/2018    Meningococcal MCV4P (Menactra) 08/08/2018    PEDS-Pneumococcal Conjugate (PCV7) 2001    Tdap 02/06/2013, 04/29/2022, 05/01/2024    Trumenba(meningococcal B) 08/08/2018    Varicella 01/23/2002, 02/06/2013     Lab Results (last 24 hours)       Procedure Component Value Units Date/Time    Hemoglobin & Hematocrit, Blood [[id number]]  (Abnormal) Collected: 06/19/24 0554    Specimen: Blood Updated: 06/19/24 0606     Hemoglobin 10.2 g/dL      Hematocrit 31.0 %             External Prenatal Results       Pregnancy Outside Results - Transcribed From Office Records - See Scanned Records For Details       Test Value Date Time    ABO  O  06/17/24 0758    Rh  Positive  06/17/24 0758    Antibody Screen  Negative  06/17/24 0758       Negative  12/27/23 1507    Varicella IgG  627 index 12/27/23 1507    Rubella  2.23 index 12/27/23 1507    Hgb  10.2 g/dL 06/19/24 0554       10.9 g/dL 06/17/24 0759       10.7 g/dL 04/17/24 1019       12.3 g/dL 12/27/23 1507    Hct  31.0 % 06/19/24 0554       32.6 % 06/17/24 0759       32.5 % 04/17/24 1019       35.7 % 12/27/23 1507    HgB A1c   5.40 % 12/27/23 1507    1h GTT  110 mg/dL 04/17/24 1019    3h GTT Fasting       3h GTT 1 hour       3h GTT 2 hour       3h GTT 3 hour        Gonorrhea (discrete)  Negative  05/29/24 1220       Negative  03/20/24 1233       Negative  12/27/23 1507    Chlamydia (discrete)  Negative  05/29/24  1220       Negative  03/20/24 1233       Negative  12/27/23 1507    RPR  Non Reactive  04/17/24 1019       Non Reactive  12/27/23 1507    Syphilis Antibody       HBsAg  Negative  12/27/23 1507    Herpes Simplex Virus PCR       Herpes Simplex VIrus Culture       HIV  Non Reactive  12/27/23 1507    Hep C RNA Quant PCR       Hep C Antibody       AFP       NIPT       Cystic Fibroisis        Group B Strep  Positive  05/29/24 1220    GBS Susceptibility to Clindamycin       GBS Susceptibility to Erythromycin       Fetal Fibronectin       Genetic Testing, Maternal Blood                 Drug Screening       Test Value Date Time    Urine Drug Screen       Amphetamine Screen  Negative  06/17/24 1100    Barbiturate Screen  Negative  06/17/24 1100    Benzodiazepine Screen  Negative  06/17/24 1100    Methadone Screen  Negative  06/17/24 1100    Phencyclidine Screen  Negative  06/17/24 1100    Opiates Screen  Negative  06/17/24 1100    THC Screen  Positive  06/17/24 1100    Cocaine Screen       Propoxyphene Screen       Buprenorphine Screen  Negative  06/17/24 1100    Methamphetamine Screen       Oxycodone Screen  Negative  06/17/24 1100    Tricyclic Antidepressants Screen  Negative  06/17/24 1100              Legend    ^: Historical                            Assessment & Plan       Encounter for elective induction of labor      Breann Baltazar is Day 1  post-partum  Vaginal, Spontaneous   .      Plan:  Continue current postpartum plan of care. Discussed non pharmacological methods for cramping.       This note has been signed electronically.    Ingris Mohan, ADAMS, APRN, CNM, RNC-OB  6/19/2024  09:14 CDT    I was present for rounds and agree with the above.

## 2024-06-19 NOTE — PLAN OF CARE
Goal Outcome Evaluation:  Plan of Care Reviewed With: patient        Progress: improving            VSS. Voiding and passing flatus without difficulty. Fundus- firm, midline, U2. Lochia- scant/rubra. Continuing to ambulate in room. Bonding well with baby. Mom experiencing anxiety about economic needs (receiving gas card upon discharge). Mom stated that she has hx of PPD.

## 2024-06-19 NOTE — PROGRESS NOTES
"      Laura Chapman MD  [facility]  [address]  [place] [address]  Office [phone]  Fax [phone]    [facility]  Vaginal Delivery Progress Note    Subjective   Postpartum Day 1: Vaginal Delivery    The patient feels well.  Her pain is well controlled with nonsteroidal anti-inflammatory drugs and Tylenol.   She is ambulating well.  Patient describes her bleeding as thin lochia.        Objective     Vital Signs Range for the last 24 hours  Temperature: Temp:  [97.3 °F (36.3 °C)-98.3 °F (36.8 °C)] 98 °F (36.7 °C)   Temp Source: Temp src: Oral   BP: BP: (106-120)/(56-78) 120/78   Pulse: Heart Rate:  [54-64] 64   Respirations: Resp:  [14-20] 14   SPO2: SpO2:  [97 %-99 %] 99 %   O2 Amount (l/min):     O2 Devices Device (Oxygen Therapy): room air   Weight:       Admit Height:  Height: 152.4 cm (60\")      Physical Exam:  General:  no acute distresss.  Abdomen: abdomen is soft without significant tenderness, masses, organomegaly or guarding.  Extremities: normal, atraumatic, no cyanosis, and trace edema.       Lab results reviewed:  Yes     Prenatal Labs  Lab Results   Component Value Date    HGB 10.2 (L) 06/19/2024    RUBELLAABIGG 2.23 12/27/2023    HEPBSAG Negative 12/27/2023    ABSCRN Negative 06/17/2024    OTR6WPE6 Non Reactive 12/27/2023    HEPCVIRUSABY Non-Reactive 09/27/2021     04/17/2024    BVD8PNKY 94 03/16/2022    STREPGPB Positive (A) 05/29/2024    URINECX Final report 12/27/2023    CHLAMNAA Negative 05/29/2024    NGONORRHON Negative 05/29/2024       Immunizations:   Immunization History   Administered Date(s) Administered    DTaP 2001, 2001, 2001, 01/23/2002, 04/25/2002, 05/12/2005    DTaP, Unspecified 2001, 2001, 2001, 01/23/2002, 04/25/2002, 05/12/2005    Flu Vaccine Quad PF >36MO 12/01/2009    Flu Vaccine Split Quad 12/01/2009    H1N1 All Forms 12/01/2009    HPV Quadrivalent 05/19/2010, 08/10/2010, 07/12/2011    HPV, Unspecified " 05/19/2010, 08/10/2010    Hep A, 2 Dose 02/06/2013, 08/08/2018    Hep B / HiB 2001, 01/23/2002    Hep B, Adolescent or Pediatric 2001    Hepatitis A 02/06/2013, 08/08/2018    Hepatitis B Adult/Adolescent IM 2001, 2001, 01/23/2002    HiB 2001, 2001, 01/23/2002    Hib (PRP-OMP) 2001    IPV 2001, 2001, 01/23/2002, 05/12/2005    MMR 04/25/2002, 05/12/2005    Meningococcal B,(Bexsero) 08/08/2018    Meningococcal Conjugate 02/06/2013, 08/08/2018    Meningococcal MCV4P (Menactra) 08/08/2018    PEDS-Pneumococcal Conjugate (PCV7) 2001    Tdap 02/06/2013, 04/29/2022, 05/01/2024    Trumenba(meningococcal B) 08/08/2018    Varicella 01/23/2002, 02/06/2013     Lab Results (last 24 hours)       Procedure Component Value Units Date/Time    Hemoglobin & Hematocrit, Blood [[id number]]  (Abnormal) Collected: 06/19/24 0554    Specimen: Blood Updated: 06/19/24 0606     Hemoglobin 10.2 g/dL      Hematocrit 31.0 %             CBC          4/17/2024    10:19 6/17/2024    07:59 6/19/2024    05:54   CBC   WBC 10.97  9.02     RBC 3.68  3.85     Hemoglobin 10.7  10.9  10.2    Hematocrit 32.5  32.6  31.0    MCV 88.3  84.7     MCH 29.1  28.3     MCHC 32.9  33.4     RDW 13.5  14.6     Platelets 246  234             Assessment & Plan       Encounter for elective induction of labor        Breann Baltazar is Day 1  post-partum  Vaginal, Spontaneous   .      Plan:  Continue current care.      Laura Chapman MD  6/19/2024  07:42 CDT

## 2024-06-19 NOTE — LACTATION NOTE
Non-nursing mother's packet left at bedside in room. Briefly spoke with GARRISON, states mother outside currently. Advised to have mother apply a compressive bra. Lactation phone number placed on marker board, instructed fob to have mother call lactation if she has any questions or concerns regarding milk suppression.     Suppression of Lactation for Non-Nursing Mothers handout    If you choose not to breastfeed, please let us know if you have any questions about whether yours was the right choice for you and your family.  While there are a very few conditions where breastfeeding is not recommended, most uses surrounding breastfeeding can be managed with proper support.  We are here to hep and support you no matter how you choose to feed your baby.    To suppress further lactation and prevent milk from coming in-- as much as possible:  **Wear a good fitting support bra without an under wire (sports bras are good)   **Wear bra continuously day and night for about 1-2 weeks  **Avoid any kind of breast stimulation such as rubbing, warmth or massage.  ** While showering, try to stand with your back to the water. The warm water will     encourage milk flow.  **Cold compression may be applied for 20 minutes once per hour as needed.  **Anti-Inflammatory medications such as ibuprofen (Motrin) may help.  Ue per your doctors and/or manufacture instructions.  ** If you develop a fever greater than 101 F, especially if you also have flu- like symptoms and any areas of redness or swelling in your breasts, please call your physician. You may need treatment for a condition called mastitis.      The Many Benefits if Breastfeeding   Breastfeeding saves time  *Breastfeeding allows you to calm or feed your baby immediately, which leads to a happier baby who cries less  *There is nothing to buy, prepare, or maintain.There is nothing to clean or sterilize.  Breastfeeding builds a mothers confidence  *She knows all her baby needs to thrive is  her!  Breastfeeding saves Money  *There is no formula to buy and healthier breast fed babies have less medical costs  Healthy Mom/Healthy baby  * babies get sick less often, and when they do they are usually sick less severely and for a shorter time  * babies have fewer ear infections  * babies have fewer allergies  *Mothers who breastfeed have a lower risk for cancer, osteoporosis, anemia, high blood pressure, obesity, and Type ll diabetes  *Mothers miss less work days with sick babies  Breast fed babies have a better dental health  * babies have better jaw development which requires lest orthodontic work  *Breast milk does not promote cavities  * babies can nurse at night without worry of tooth decay  Breastfeeding allows a baby to reach his full IQ potential  *The longer a baby is breast fed, the better their brain development  Breast fed babies and moms are more relaxed  *The hormones released during breastfeeding have a calming effect on mothers  *Breastfeeding requires mom to take a break; this may help mom get more rest after delivery  *Breastfeeding is quicker than preparing formula which allows mom and baby to get back to sleep faster  *Breastfeeding promotes bonding and allows mom to learn babies cues and care needs more quickly  Breastfeeding cleanup is easier  *The bowel movements and spit up of breast fed babies doesn't smell as bad  *Spit-up of breast fed babies doesn't stain clothing  Getting out of the hourse is easier  *No formula bottles to prepare and carry safely   *No time restraints due to worry about what baby will eat  *No worries about warming a bottle or finding safe water to prepare bottles  Breastfeeding mother get their bodies back sooner  *The uterus shrinks more quickly and completely, which allows a flatter tummy  *Breastfeeding burns 400-500 calories a day; making milk torches stored fat!  Breastfeeding is better for the  environment  *There is no trash to dispose of after breastfeeding  *There is no production facility to produce breast milk; moms body does it all without the pollution of a factory      Your Guide to formula feeding your baby by [employer], www.[employer]

## 2024-06-20 VITALS
SYSTOLIC BLOOD PRESSURE: 125 MMHG | DIASTOLIC BLOOD PRESSURE: 76 MMHG | HEIGHT: 60 IN | OXYGEN SATURATION: 98 % | BODY MASS INDEX: 42.09 KG/M2 | TEMPERATURE: 98.2 F | WEIGHT: 214.4 LBS | HEART RATE: 78 BPM | RESPIRATION RATE: 16 BRPM

## 2024-06-20 RX ORDER — IBUPROFEN 600 MG/1
600 TABLET ORAL EVERY 6 HOURS PRN
Qty: 30 TABLET | Refills: 0 | Status: SHIPPED | OUTPATIENT
Start: 2024-06-20

## 2024-06-20 RX ORDER — DOCUSATE SODIUM 100 MG/1
100 CAPSULE, LIQUID FILLED ORAL 2 TIMES DAILY
Qty: 60 CAPSULE | Refills: 0 | Status: SHIPPED | OUTPATIENT
Start: 2024-06-20

## 2024-06-20 RX ADMIN — DOCUSATE SODIUM 100 MG: 100 CAPSULE, LIQUID FILLED ORAL at 09:30

## 2024-06-20 RX ADMIN — PRENATAL VIT W/ FE FUMARATE-FA TAB 27-0.8 MG 1 TABLET: 27-0.8 TAB at 09:30

## 2024-06-20 RX ADMIN — CETIRIZINE HYDROCHLORIDE 10 MG: 10 TABLET ORAL at 09:30

## 2024-06-20 NOTE — CASE MANAGEMENT/SOCIAL WORK
Pt out of room but did provide father of baby 2 gas cards as they were needing to get home at discharge.

## 2024-06-20 NOTE — DISCHARGE SUMMARY
[facility] [place] [facility]    Ingris L Kimberlee, APRN  [address]  [place] [address]  [phone]      Discharge Summary     [place]  Breann Baltazar  : 2000  MRN: [id number]  CSN: [id number]    Date of Admission: 2024   Date of Discharge:  2024   Delivering Physician: Tomas Arrington        Admission Diagnosis: Encounter for elective induction of labor [Z34.90]   Discharge Diagnosis: Pregnancy at 39w2d - delivered       Procedures: 2024  - Vaginal, Spontaneous       Hospital Course  Patient is a 24 y.o.  who at 39w2d had a vaginal birth.  Her postpartum course was without complications.  On PPD #2 she was ready for discharge.  She had normal lochia and pain well controlled with oral medications.    Infant  male  fetus weighing 2860 g (6 lb 4.9 oz)   Apgars -  8 @ 1 minute /  9 @ 5 minutes.    Discharge labs  Lab Results   Component Value Date    WBC 9.02 2024    HGB 10.2 (L) 2024    HCT 31.0 (L) 2024     2024       Discharge Medications     Discharge Medications        New Medications        Instructions Start Date   docusate sodium 100 MG capsule  Commonly known as: Colace   100 mg, Oral, 2 Times Daily      ibuprofen 600 MG tablet  Commonly known as: ADVIL,MOTRIN   600 mg, Oral, Every 6 Hours PRN             Continue These Medications        Instructions Start Date   albuterol sulfate  (90 Base) MCG/ACT inhaler  Commonly known as: PROVENTIL HFA;VENTOLIN HFA;PROAIR HFA   2 puffs, Inhalation, Every 4 Hours PRN      cetirizine 10 MG tablet  Commonly known as: zyrTEC       famotidine 20 MG tablet  Commonly known as: Pepcid   20 mg, Oral, Daily      ondansetron 4 MG tablet  Commonly known as: ZOFRAN   4 mg, Oral, Every 8 Hours PRN      prenatal (CLASSIC) vitamin 28-0.8 MG tablet tablet  Generic drug: prenatal vitamin   Oral, Daily               External Prenatal Results       Pregnancy Outside Results - Transcribed From  Office Records - See Scanned Records For Details       Test Value Date Time    ABO  O  06/17/24 0758    Rh  Positive  06/17/24 0758    Antibody Screen  Negative  06/17/24 0758       Negative  12/27/23 1507    Varicella IgG  627 index 12/27/23 1507    Rubella  2.23 index 12/27/23 1507    Hgb  10.2 g/dL 06/19/24 0554       10.9 g/dL 06/17/24 0759       10.7 g/dL 04/17/24 1019       12.3 g/dL 12/27/23 1507    Hct  31.0 % 06/19/24 0554       32.6 % 06/17/24 0759       32.5 % 04/17/24 1019       35.7 % 12/27/23 1507    HgB A1c   5.40 % 12/27/23 1507    1h GTT  110 mg/dL 04/17/24 1019    3h GTT Fasting       3h GTT 1 hour       3h GTT 2 hour       3h GTT 3 hour        Gonorrhea (discrete)  Negative  05/29/24 1220       Negative  03/20/24 1233       Negative  12/27/23 1507    Chlamydia (discrete)  Negative  05/29/24 1220       Negative  03/20/24 1233       Negative  12/27/23 1507    RPR  Non Reactive  04/17/24 1019       Non Reactive  12/27/23 1507    Syphilis Antibody       HBsAg  Negative  12/27/23 1507    Herpes Simplex Virus PCR       Herpes Simplex VIrus Culture       HIV  Non Reactive  12/27/23 1507    Hep C RNA Quant PCR       Hep C Antibody       AFP       NIPT       Cystic Fibroisis        Group B Strep  Positive  05/29/24 1220    GBS Susceptibility to Clindamycin       GBS Susceptibility to Erythromycin       Fetal Fibronectin       Genetic Testing, Maternal Blood                 Drug Screening       Test Value Date Time    Urine Drug Screen       Amphetamine Screen  Negative  06/17/24 1100    Barbiturate Screen  Negative  06/17/24 1100    Benzodiazepine Screen  Negative  06/17/24 1100    Methadone Screen  Negative  06/17/24 1100    Phencyclidine Screen  Negative  06/17/24 1100    Opiates Screen  Negative  06/17/24 1100    THC Screen  Positive  06/17/24 1100    Cocaine Screen       Propoxyphene Screen       Buprenorphine Screen  Negative  06/17/24 1100    Methamphetamine Screen       Oxycodone Screen  Negative   06/17/24 1100    Tricyclic Antidepressants Screen  Negative  06/17/24 1100              Legend    ^: Historical                            Discharge Disposition Home or Self Care   Condition on Discharge: good   Follow-up: 6 weeks with Dr. Downing       Plan for discharge reviewed with Dr. Arrington.    This note has been signed electronically.    Ingris Mohan, ADAMS, APRN, CNM, RNC-OB  6/20/2024

## 2024-06-20 NOTE — PROGRESS NOTES
"      JUAN Olivas  [facility]  [address]  [place] [address]  Office [phone]  Fax [phone]    [facility]  Vaginal Delivery Progress Note    Subjective   Postpartum Day 2: Vaginal Delivery    The patient feels good.  Her pain is well controlled with Tylenol and comfort products.   She is ambulating well.  Patient describes her bleeding as lighter.    Breastfeeding: Declines due to being educated on pumping and dumping with THC use, which she states she uses for the tumors in her back..    Objective     Vital Signs Range for the last 24 hours  Temperature: Temp:  [97.7 °F (36.5 °C)-98.2 °F (36.8 °C)] 98.2 °F (36.8 °C)   Temp Source: Temp src: Oral   BP: BP: (116-125)/(64-76) 125/76   Pulse: Heart Rate:  [69-78] 78   Respirations: Resp:  [16] 16   SPO2: SpO2:  [98 %] 98 %   O2 Amount (l/min):     O2 Devices Device (Oxygen Therapy): room air   Weight:       Admit Height:  Height: 152.4 cm (60\")      Physical Exam:  General:  no acute distresss.  Abdomen: abdomen is soft without significant tenderness, masses, organomegaly or guarding. Fundus: appropriate, firm, non tender  Extremities: normal, atraumatic, no cyanosis, and trace edema.       Lab results reviewed:  Yes   Rubella:  No results found for: \"RUBELLAIGGIN\" Nurse Transcribed from prenatal record --  No components found for: \"EXTRUBELQUAL\"  Rh Status:  No results found for: \"RH\"  Immunizations:   Immunization History   Administered Date(s) Administered    DTaP 2001, 2001, 2001, 01/23/2002, 04/25/2002, 05/12/2005    DTaP, Unspecified 2001, 2001, 2001, 01/23/2002, 04/25/2002, 05/12/2005    Flu Vaccine Quad PF >36MO 12/01/2009    Flu Vaccine Split Quad 12/01/2009    H1N1 All Forms 12/01/2009    HPV Quadrivalent 05/19/2010, 08/10/2010, 07/12/2011    HPV, Unspecified 05/19/2010, 08/10/2010    Hep A, 2 Dose 02/06/2013, 08/08/2018    Hep B / HiB 2001, 01/23/2002    Hep B, Adolescent or " Pediatric 2001    Hepatitis A 02/06/2013, 08/08/2018    Hepatitis B Adult/Adolescent IM 2001, 2001, 01/23/2002    HiB 2001, 2001, 01/23/2002    Hib (PRP-OMP) 2001    IPV 2001, 2001, 01/23/2002, 05/12/2005    MMR 04/25/2002, 05/12/2005    Meningococcal B,(Bexsero) 08/08/2018    Meningococcal Conjugate 02/06/2013, 08/08/2018    Meningococcal MCV4P (Menactra) 08/08/2018    PEDS-Pneumococcal Conjugate (PCV7) 2001    Tdap 02/06/2013, 04/29/2022, 05/01/2024    Trumenba(meningococcal B) 08/08/2018    Varicella 01/23/2002, 02/06/2013     Lab Results (last 24 hours)       ** No results found for the last 24 hours. **            External Prenatal Results       Pregnancy Outside Results - Transcribed From Office Records - See Scanned Records For Details       Test Value Date Time    ABO  O  06/17/24 0758    Rh  Positive  06/17/24 0758    Antibody Screen  Negative  06/17/24 0758       Negative  12/27/23 1507    Varicella IgG  627 index 12/27/23 1507    Rubella  2.23 index 12/27/23 1507    Hgb  10.2 g/dL 06/19/24 0554       10.9 g/dL 06/17/24 0759       10.7 g/dL 04/17/24 1019       12.3 g/dL 12/27/23 1507    Hct  31.0 % 06/19/24 0554       32.6 % 06/17/24 0759       32.5 % 04/17/24 1019       35.7 % 12/27/23 1507    HgB A1c   5.40 % 12/27/23 1507    1h GTT  110 mg/dL 04/17/24 1019    3h GTT Fasting       3h GTT 1 hour       3h GTT 2 hour       3h GTT 3 hour        Gonorrhea (discrete)  Negative  05/29/24 1220       Negative  03/20/24 1233       Negative  12/27/23 1507    Chlamydia (discrete)  Negative  05/29/24 1220       Negative  03/20/24 1233       Negative  12/27/23 1507    RPR  Non Reactive  04/17/24 1019       Non Reactive  12/27/23 1507    Syphilis Antibody       HBsAg  Negative  12/27/23 1507    Herpes Simplex Virus PCR       Herpes Simplex VIrus Culture       HIV  Non Reactive  12/27/23 1507    Hep C RNA Quant PCR       Hep C Antibody       AFP       NIPT        Cystic Fibroisis        Group B Strep  Positive  05/29/24 1220    GBS Susceptibility to Clindamycin       GBS Susceptibility to Erythromycin       Fetal Fibronectin       Genetic Testing, Maternal Blood                 Drug Screening       Test Value Date Time    Urine Drug Screen       Amphetamine Screen  Negative  06/17/24 1100    Barbiturate Screen  Negative  06/17/24 1100    Benzodiazepine Screen  Negative  06/17/24 1100    Methadone Screen  Negative  06/17/24 1100    Phencyclidine Screen  Negative  06/17/24 1100    Opiates Screen  Negative  06/17/24 1100    THC Screen  Positive  06/17/24 1100    Cocaine Screen       Propoxyphene Screen       Buprenorphine Screen  Negative  06/17/24 1100    Methamphetamine Screen       Oxycodone Screen  Negative  06/17/24 1100    Tricyclic Antidepressants Screen  Negative  06/17/24 1100              Legend    ^: Historical                            Assessment & Plan       Encounter for elective induction of labor      Breann Baltazar is Day 2  post-partum  Vaginal, Spontaneous   .      Plan: Continue current postpartum plan of care.  Discharge instructions provided.  Questions answered and understanding verbalized.  Return to the clinic in 6 weeks and as needed with concerns.      Discharge reviewed with Dr. Arrington.    This note has been signed electronically.    Ingris Mohan, ADAMS, APRN, CNM, RNC-OB  6/20/2024  10:24 CDT    Was present for rounds and agree with the above.

## 2024-06-20 NOTE — PLAN OF CARE
Goal Outcome Evaluation:  Plan of Care Reviewed With: patient        Progress: improving  Outcome Evaluation: VSS, FFML U1, scant rubra, voiding, ambulating, bonding with

## 2024-06-26 LAB
CANNABINOIDS UR QL CFM: NORMAL
CARBOXYTHC/CREAT UR: 155 NG/MG CREAT
LEVEL OF DETECTION:: NORMAL

## 2024-07-03 ENCOUNTER — MATERNAL SCREENING (OUTPATIENT)
Dept: CALL CENTER | Facility: HOSPITAL | Age: 24
End: 2024-07-03
Payer: COMMERCIAL

## 2024-07-03 NOTE — OUTREACH NOTE
Maternal Screening Survey      Flowsheet Row Responses   Facility patient discharged from? [place]   Attempt successful? No   Unsuccessful attempts Attempt 2   Revoke Decline to participate  [phone number listed belongs to mother, unable to reach patient]              Devika WHITE - Registered Nurse

## 2024-07-03 NOTE — OUTREACH NOTE
Maternal Screening Survey      Flowsheet Row Responses   Facility patient discharged from? [facility]   Attempt successful? No   Unsuccessful attempts Attempt 1  [Mother states to call back tomorrow to reach patient]              Devika WHITE - Registered Nurse

## 2024-07-30 ENCOUNTER — TELEPHONE (OUTPATIENT)
Dept: OBSTETRICS AND GYNECOLOGY | Age: 24
End: 2024-07-30
Payer: COMMERCIAL

## 2024-07-30 NOTE — TELEPHONE ENCOUNTER
Pt was a no show to postpartum appt today. Called pt to get rescheduled and spoke with pt mother. Mother stated pt worked midnights last night so she was asleep. Pt mom is going to her pt call us back later

## 2024-08-21 ENCOUNTER — POSTPARTUM VISIT (OUTPATIENT)
Dept: OBSTETRICS AND GYNECOLOGY | Age: 24
End: 2024-08-21
Payer: COMMERCIAL

## 2024-08-21 VITALS
SYSTOLIC BLOOD PRESSURE: 108 MMHG | DIASTOLIC BLOOD PRESSURE: 78 MMHG | WEIGHT: 203 LBS | BODY MASS INDEX: 39.85 KG/M2 | HEIGHT: 60 IN

## 2024-08-21 DIAGNOSIS — Z30.09 ENCOUNTER FOR OTHER GENERAL COUNSELING OR ADVICE ON CONTRACEPTION: ICD-10-CM

## 2024-08-21 NOTE — PROGRESS NOTES
"      José Miguel Downing MD  [facility]  [address]  [place] [address]  Office [phone]  Fax [phone]      [facility]  Breann Baltazar  : 2000  MRN: [id number]    Subjective   Subjective     Chief Complaint   Patient presents with    Postpartum Care     Vaginal delivery 24. Male. Formula feeding. PPD score 0       History of Present Illness  Postpartum Visit  Patient is here for a postpartum visit. She is 6 weeks postpartum following a spontaneous vaginal delivery.     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    Postpartum course has been uncomplicated.   Baby's course has been uncomplicated.     Baby is feeding by formula - denies breast complaints. Bleeding no bleeding. Two cycles since delivery. Bowel function is normal. Bladder function is normal. Patient is sexually active. Contraception method is condoms. Postpartum depression screening: negative.    Review of Systems   Constitutional:  Negative for activity change, appetite change, chills, fatigue and fever.   Respiratory:  Negative for cough and shortness of breath.    Cardiovascular:  Negative for chest pain and leg swelling.   Gastrointestinal:  Negative for abdominal pain, constipation, diarrhea, nausea and vomiting.   Genitourinary:  Negative for decreased urine volume, dysuria, frequency, menstrual problem, pelvic pain, urgency, vaginal bleeding, vaginal discharge and vaginal pain.   Psychiatric/Behavioral:  Negative for decreased concentration, dysphoric mood, self-injury, sleep disturbance and suicidal ideas. The patient is not nervous/anxious.    All other systems reviewed and are negative.         Objective    Objective     Vitals:   Visit Vitals  /78 (BP Location: Left arm, Patient Position: Sitting)   Ht 152.4 cm (60\")   Wt 92.1 kg (203 " lb)   LMP 2024 (Approximate)   Breastfeeding No   BMI 39.65 kg/m²        Physical Exam  Vitals and nursing note reviewed.   Constitutional:       General: She is not in acute distress.     Appearance: Normal appearance. She is not ill-appearing.   HENT:      Head: Normocephalic and atraumatic.      Nose: No congestion or rhinorrhea.   Eyes:      General: No scleral icterus.        Right eye: No discharge.         Left eye: No discharge.      Extraocular Movements: Extraocular movements intact.      Conjunctiva/sclera: Conjunctivae normal.   Pulmonary:      Effort: Pulmonary effort is normal. No accessory muscle usage or respiratory distress.   Abdominal:      General: Abdomen is flat.      Palpations: Abdomen is soft.      Tenderness: There is no abdominal tenderness.   Musculoskeletal:      Right lower leg: No edema.      Left lower leg: No edema.   Skin:     General: Skin is warm and dry.      Coloration: Skin is not ashen, cyanotic or jaundiced.   Neurological:      General: No focal deficit present.      Mental Status: She is alert and oriented to person, place, and time.      Deep Tendon Reflexes: Reflexes normal.   Psychiatric:         Mood and Affect: Mood normal.         Behavior: Behavior is cooperative.           Result Review    Postpartum Depression: Low Risk  (2024)    [place]  Depression Scale     Last EPDS Total Score: 0     Last EPDS Self Harm Result: Not on file   Recent Concern: Postpartum Depression - Medium Risk (2024)    [place]  Depression Scale     Last EPDS Total Score: 6     Last EPDS Self Harm Result: Not on file             Assessment & Plan   Assessment / Plan     Diagnoses and all orders for this visit:    1. Postpartum follow-up (Primary)    2. Encounter for other general counseling or advice on contraception      Meeting postpartum milestones  Contraception - declines. States they caused adverse effect on mood. Desires tubal.     Risks, benefits,  and alternatives of permanent sterilization were discussed with the patient in detail including feelings of regret. Intraoperative risks of bleeding and damage to surrounding organs, including but not limited to intestine, bladder and ureter, were explained. Management of these were also explained. Postoperative complications such as infection, pneumonia, DVT, and bleeding were explained. The importance of compliance with postoperative restrictions was discussed. Laparoscopic options were discussed. Success and failure rates were discussed. Increased risk of ectopic pregnancy was explained. In addition, reversible forms of contraception were reviewed such as the pill, the patch/ring, the shot, the implant, and the IUD.     Specifically, bilateral salpingectomy was discussed.  Reduction in fallopian tube cancer was discussed as well as potential decrease in ovarian cancer risk discussed.  Irreversible nature of this approach as well as the need for at least one additional laparoscopic incision was discussed. Discussed that if she does desire future fertility, that Assisted Reproductive Technologies (ie IVF), would be needed, of which is of significant financial burden. She expressed understanding of the above.     All of the patient's questions were answered to her satisfaction. She was encouraged to return for an additional appointment if she had further questions. She verbalized understanding of the above and wished to proceed with the outlined plan.    Consent signed. Return for pre-op visit. Contraception until then advised. She declined and is going to continue condoms.     Return in about 6 weeks (around 10/2/2024) for Pre-Op.      José Miguel Downing MD

## 2024-10-08 ENCOUNTER — TELEPHONE (OUTPATIENT)
Dept: OBSTETRICS AND GYNECOLOGY | Age: 24
End: 2024-10-08
Payer: COMMERCIAL

## 2024-10-08 ENCOUNTER — OFFICE VISIT (OUTPATIENT)
Dept: OBSTETRICS AND GYNECOLOGY | Age: 24
End: 2024-10-08
Payer: COMMERCIAL

## 2024-10-08 VITALS
SYSTOLIC BLOOD PRESSURE: 108 MMHG | WEIGHT: 193.4 LBS | BODY MASS INDEX: 37.97 KG/M2 | DIASTOLIC BLOOD PRESSURE: 72 MMHG | HEIGHT: 60 IN

## 2024-10-08 DIAGNOSIS — F32.1 CURRENT MODERATE EPISODE OF MAJOR DEPRESSIVE DISORDER WITHOUT PRIOR EPISODE: ICD-10-CM

## 2024-10-08 DIAGNOSIS — Z30.09 STERILIZATION CONSULT: Primary | ICD-10-CM

## 2024-10-08 RX ORDER — SODIUM CHLORIDE 9 MG/ML
40 INJECTION, SOLUTION INTRAVENOUS AS NEEDED
OUTPATIENT
Start: 2024-10-08 | End: 2024-10-09

## 2024-10-08 RX ORDER — SODIUM CHLORIDE 0.9 % (FLUSH) 0.9 %
10 SYRINGE (ML) INJECTION EVERY 12 HOURS SCHEDULED
OUTPATIENT
Start: 2024-10-08

## 2024-10-08 RX ORDER — SODIUM CHLORIDE 0.9 % (FLUSH) 0.9 %
1-10 SYRINGE (ML) INJECTION AS NEEDED
OUTPATIENT
Start: 2024-10-08

## 2024-10-08 RX ORDER — SODIUM CHLORIDE 9 MG/ML
100 INJECTION, SOLUTION INTRAVENOUS CONTINUOUS
OUTPATIENT
Start: 2024-10-08 | End: 2024-11-01

## 2024-10-08 NOTE — H&P (VIEW-ONLY)
José Miguel Downing MD  [facility]  [address]  [place] [address]  Office [phone]  Fax [phone]      [facility]  Breann Baltazar  : 2000  MRN: [id number]    Subjective   Subjective     Chief Complaint   Patient presents with    Pre-op Exam     Pt here for pre-op for tubal (not scheduled). Wants tied instead of removed.       History of Present Illness  Breann Baltazar is a 24 y.o. female , , who comes to the office today for tubal consult. Discussed at last visit. She desires to continue with tubal but wants to plan for Filshie clips instead of salpingectomy.      Review of Systems   Genitourinary:  Negative for decreased urine volume, difficulty urinating, dyspareunia, dysuria, enuresis, flank pain, frequency, genital sores, hematuria, menstrual problem, pelvic pain, urgency, vaginal bleeding, vaginal discharge and vaginal pain.   All other systems reviewed and are negative.       Personal History     The following portions of the patient's history were reviewed and updated as appropriate: allergies, current medications, past family history, past medical history, past social history, past surgical history, and problem list.    OB hx:  OB History    Para Term  AB Living   2 2 2     2   SAB IAB Ectopic Molar Multiple Live Births           0 2      # Outcome Date GA Lbr Fredis/2nd Weight Sex Type Anes PTL Lv   2 Term 25 39w2d 11:30 / 01:35 2860 g (6 lb 4.9 oz) M Vag-Spont EPI N MAYCOL      Birth Comments: HC: 33cm   1 Term 23 40w3d 13:32 / 00:43 3220 g (7 lb 1.6 oz) F Vag-Spont EPI N MAYCOL      Past Medical History:   Diagnosis Date    Allergic     Anxiety     Asthma     Depression     Multiple hereditary exostoses     Smoker        Past Surgical History:   Procedure Laterality Date    FEMUR OSTEOCHONDROMA Right     KNEE SURGERY Bilateral 2015    both knees     WISDOM TOOTH EXTRACTION         Current Outpatient Medications   Medication Instructions     "albuterol sulfate  (90 Base) MCG/ACT inhaler 2 puffs, Inhalation, Every 4 Hours PRN       No Known Allergies    Social History     Socioeconomic History    Marital status: Single   Tobacco Use    Smoking status: Every Day     Current packs/day: 0.50     Average packs/day: 0.5 packs/day for 2.0 years (1.0 ttl pk-yrs)     Types: Cigarettes    Smokeless tobacco: Never   Vaping Use    Vaping status: Never Used   Substance and Sexual Activity    Alcohol use: Not Currently    Drug use: Yes     Types: Marijuana    Sexual activity: Yes     Partners: Male     Birth control/protection: None       Family History   Problem Relation Age of Onset    COPD Mother     Asthma Mother     Diabetes Father     Hypertension Father     Cirrhosis Father     Heart attack Paternal Grandfather        Objective    Objective     Vitals:   Visit Vitals  /72   Ht 152.4 cm (60\")   Wt 87.7 kg (193 lb 6.4 oz)   BMI 37.77 kg/m²        Physical Exam  Vitals reviewed.   Constitutional:       General: She is not in acute distress.     Appearance: Normal appearance. She is not ill-appearing.   HENT:      Head: Normocephalic and atraumatic.      Nose: No congestion or rhinorrhea.   Eyes:      General: No scleral icterus.        Right eye: No discharge.         Left eye: No discharge.      Extraocular Movements: Extraocular movements intact.      Conjunctiva/sclera: Conjunctivae normal.   Pulmonary:      Effort: Pulmonary effort is normal. No accessory muscle usage or respiratory distress.   Musculoskeletal:      Right lower leg: No edema.      Left lower leg: No edema.   Skin:     General: Skin is warm and dry.      Coloration: Skin is not ashen, cyanotic or jaundiced.   Neurological:      General: No focal deficit present.      Mental Status: She is alert and oriented to person, place, and time.   Psychiatric:         Mood and Affect: Mood normal.         Behavior: Behavior is cooperative.             Assessment & Plan   Assessment / Plan "     Diagnoses and all orders for this visit:    1. Sterilization consult (Primary)  -     Case Request; Standing  -     Pregnancy, Urine - Urine, Clean Catch; Future  -     Type & Screen; Future  -     sodium chloride 0.9 % flush 10 mL  -     sodium chloride 0.9 % flush 1-10 mL  -     sodium chloride 0.9 % infusion 40 mL  -     sodium chloride 0.9 % infusion  -     Case Request    2. Current moderate episode of major depressive disorder without prior episode  -     Ambulatory Referral to Psychiatry    Other orders  -     Follow Anesthesia Guidelines / Protocol; Future  -     Follow Anesthesia Guidelines / Protocol; Standing  -     Clip Operative Site; Standing  -     Verify / Perform Chlorhexidine Skin Prep; Standing  -     Obtain Informed Consent; Future  -     Provide NPO Instructions to Patient; Future  -     Chlorhexidine Skin Prep; Future  -     Pregnancy, Urine - Urine, Clean Catch; Standing  -     Type & Screen; Standing  -     Insert Peripheral IV; Standing  -     Saline Lock & Maintain IV Access; Standing  -     Place Sequential Compression Device; Standing  -     Maintain Sequential Compression Device; Standing          Discussion:   Risks, benefits, and alternatives of permanent sterilization were discussed with the patient in detail including feelings of regret. Intraoperative risks of bleeding and damage to surrounding organs, including but not limited to intestine, bladder and ureter, were explained. Management of these were also explained. Postoperative complications such as infection, pneumonia, DVT, and bleeding were explained. The importance of compliance with postoperative restrictions was discussed. Laparoscopic options were discussed. Success and failure rates were discussed. Increased risk of ectopic pregnancy was explained. In addition, reversible forms of contraception were reviewed such as the pill, the patch/ring, the shot, the implant, and the IUD.     Specifically, bilateral salpingectomy  was discussed.  Reduction in fallopian tube cancer was discussed as well as potential decrease in ovarian cancer risk discussed.  Irreversible nature of this approach as well as the need for at least one additional laparoscopic incision was discussed. Discussed that if she does desire future fertility, that Assisted Reproductive Technologies (ie IVF), would be needed, of which is of significant financial burden. She expressed understanding of the above.     All of the patient's questions were answered to her satisfaction. She was encouraged to return for an additional appointment if she had further questions. She verbalized understanding of the above and wished to proceed with the outlined plan.    Tubal tentatively to be on 10/21.     Surgical Counseling  The patient was informed of the risks and benefits of the planned procedures. The risks included but were not limited to: bleeding, infection, injury to surrounding structures potentially including the vascular, gastrointestinal, and genitourinary systems, nerve injury, possible blood transfusion and its associated risks, possible bilateral oophorectomy. Patient was counseled on the possibility of a laparotomy, and all other indicated procedures. We discussed the possibility of difficulties with anesthesia, potential exacerbations to other health conditions, and potential concern for chronic pain that follows any surgery. Patient expressed understanding of the risks involved. Her questions were answered to her satisfaction. No guarantees were made or implied. The patient consented to proceed with the planned procedures.      Follow-up: Return in about 20 days (around 10/28/2024) for Post-op.    José Miguel Downing MD

## 2024-10-08 NOTE — PROGRESS NOTES
José Miguel Downing MD  [facility]  [address]  [place] [address]  Office [phone]  Fax [phone]      [facility]  Breann Baltazar  : 2000  MRN: [id number]    Subjective   Subjective     Chief Complaint   Patient presents with    Pre-op Exam     Pt here for pre-op for tubal (not scheduled). Wants tied instead of removed.       History of Present Illness  Breann Baltazar is a 24 y.o. female , , who comes to the office today for tubal consult. Discussed at last visit. She desires to continue with tubal but wants to plan for Filshie clips instead of salpingectomy.      Review of Systems   Genitourinary:  Negative for decreased urine volume, difficulty urinating, dyspareunia, dysuria, enuresis, flank pain, frequency, genital sores, hematuria, menstrual problem, pelvic pain, urgency, vaginal bleeding, vaginal discharge and vaginal pain.   All other systems reviewed and are negative.       Personal History     The following portions of the patient's history were reviewed and updated as appropriate: allergies, current medications, past family history, past medical history, past social history, past surgical history, and problem list.    OB hx:  OB History    Para Term  AB Living   2 2 2     2   SAB IAB Ectopic Molar Multiple Live Births           0 2      # Outcome Date GA Lbr Fredis/2nd Weight Sex Type Anes PTL Lv   2 Term 25 39w2d 11:30 / 01:35 2860 g (6 lb 4.9 oz) M Vag-Spont EPI N MAYCOL      Birth Comments: HC: 33cm   1 Term 23 40w3d 13:32 / 00:43 3220 g (7 lb 1.6 oz) F Vag-Spont EPI N MAYCOL      Past Medical History:   Diagnosis Date    Allergic     Anxiety     Asthma     Depression     Multiple hereditary exostoses     Smoker        Past Surgical History:   Procedure Laterality Date    FEMUR OSTEOCHONDROMA Right     KNEE SURGERY Bilateral 2015    both knees     WISDOM TOOTH EXTRACTION         Current Outpatient Medications   Medication Instructions     "albuterol sulfate  (90 Base) MCG/ACT inhaler 2 puffs, Inhalation, Every 4 Hours PRN       No Known Allergies    Social History     Socioeconomic History    Marital status: Single   Tobacco Use    Smoking status: Every Day     Current packs/day: 0.50     Average packs/day: 0.5 packs/day for 2.0 years (1.0 ttl pk-yrs)     Types: Cigarettes    Smokeless tobacco: Never   Vaping Use    Vaping status: Never Used   Substance and Sexual Activity    Alcohol use: Not Currently    Drug use: Yes     Types: Marijuana    Sexual activity: Yes     Partners: Male     Birth control/protection: None       Family History   Problem Relation Age of Onset    COPD Mother     Asthma Mother     Diabetes Father     Hypertension Father     Cirrhosis Father     Heart attack Paternal Grandfather        Objective    Objective     Vitals:   Visit Vitals  /72   Ht 152.4 cm (60\")   Wt 87.7 kg (193 lb 6.4 oz)   BMI 37.77 kg/m²        Physical Exam  Vitals reviewed.   Constitutional:       General: She is not in acute distress.     Appearance: Normal appearance. She is not ill-appearing.   HENT:      Head: Normocephalic and atraumatic.      Nose: No congestion or rhinorrhea.   Eyes:      General: No scleral icterus.        Right eye: No discharge.         Left eye: No discharge.      Extraocular Movements: Extraocular movements intact.      Conjunctiva/sclera: Conjunctivae normal.   Pulmonary:      Effort: Pulmonary effort is normal. No accessory muscle usage or respiratory distress.   Musculoskeletal:      Right lower leg: No edema.      Left lower leg: No edema.   Skin:     General: Skin is warm and dry.      Coloration: Skin is not ashen, cyanotic or jaundiced.   Neurological:      General: No focal deficit present.      Mental Status: She is alert and oriented to person, place, and time.   Psychiatric:         Mood and Affect: Mood normal.         Behavior: Behavior is cooperative.             Assessment & Plan   Assessment / Plan "     Diagnoses and all orders for this visit:    1. Sterilization consult (Primary)  -     Case Request; Standing  -     Pregnancy, Urine - Urine, Clean Catch; Future  -     Type & Screen; Future  -     sodium chloride 0.9 % flush 10 mL  -     sodium chloride 0.9 % flush 1-10 mL  -     sodium chloride 0.9 % infusion 40 mL  -     sodium chloride 0.9 % infusion  -     Case Request    2. Current moderate episode of major depressive disorder without prior episode  -     Ambulatory Referral to Psychiatry    Other orders  -     Follow Anesthesia Guidelines / Protocol; Future  -     Follow Anesthesia Guidelines / Protocol; Standing  -     Clip Operative Site; Standing  -     Verify / Perform Chlorhexidine Skin Prep; Standing  -     Obtain Informed Consent; Future  -     Provide NPO Instructions to Patient; Future  -     Chlorhexidine Skin Prep; Future  -     Pregnancy, Urine - Urine, Clean Catch; Standing  -     Type & Screen; Standing  -     Insert Peripheral IV; Standing  -     Saline Lock & Maintain IV Access; Standing  -     Place Sequential Compression Device; Standing  -     Maintain Sequential Compression Device; Standing          Discussion:   Risks, benefits, and alternatives of permanent sterilization were discussed with the patient in detail including feelings of regret. Intraoperative risks of bleeding and damage to surrounding organs, including but not limited to intestine, bladder and ureter, were explained. Management of these were also explained. Postoperative complications such as infection, pneumonia, DVT, and bleeding were explained. The importance of compliance with postoperative restrictions was discussed. Laparoscopic options were discussed. Success and failure rates were discussed. Increased risk of ectopic pregnancy was explained. In addition, reversible forms of contraception were reviewed such as the pill, the patch/ring, the shot, the implant, and the IUD.     Specifically, bilateral salpingectomy  was discussed.  Reduction in fallopian tube cancer was discussed as well as potential decrease in ovarian cancer risk discussed.  Irreversible nature of this approach as well as the need for at least one additional laparoscopic incision was discussed. Discussed that if she does desire future fertility, that Assisted [employer] (ie IVF), would be needed, of which is of significant financial burden. She expressed understanding of the above.     All of the patient's questions were answered to her satisfaction. She was encouraged to return for an additional appointment if she had further questions. She verbalized understanding of the above and wished to proceed with the outlined plan.    Tubal tentatively to be on 10/21.     Surgical Counseling  The patient was informed of the risks and benefits of the planned procedures. The risks included but were not limited to: bleeding, infection, injury to surrounding structures potentially including the vascular, gastrointestinal, and genitourinary systems, nerve injury, possible blood transfusion and its associated risks, possible bilateral oophorectomy. Patient was counseled on the possibility of a laparotomy, and all other indicated procedures. We discussed the possibility of difficulties with anesthesia, potential exacerbations to other health conditions, and potential concern for chronic pain that follows any surgery. Patient expressed understanding of the risks involved. Her questions were answered to her satisfaction. No guarantees were made or implied. The patient consented to proceed with the planned procedures.      Follow-up: Return in about 20 days (around 10/28/2024) for Post-op.    José Miguel Downing MD

## 2024-10-17 ENCOUNTER — TELEPHONE (OUTPATIENT)
Dept: OBSTETRICS AND GYNECOLOGY | Age: 24
End: 2024-10-17
Payer: COMMERCIAL

## 2024-10-17 NOTE — TELEPHONE ENCOUNTER
Pt called back, got in touch with Alycia in scheduling and transferred patient to her to get rescheduled on prework labs.

## 2024-10-17 NOTE — TELEPHONE ENCOUNTER
Called patient's contacts, Micaela Alonso and Kimber Foreman, to ask for them to have patient give us a call back. No answer. Lm to have them or her return my call.

## 2024-10-18 ENCOUNTER — PRE-ADMISSION TESTING (OUTPATIENT)
Dept: PREADMISSION TESTING | Facility: HOSPITAL | Age: 24
End: 2024-10-18
Payer: COMMERCIAL

## 2024-10-18 VITALS
DIASTOLIC BLOOD PRESSURE: 63 MMHG | OXYGEN SATURATION: 98 % | SYSTOLIC BLOOD PRESSURE: 114 MMHG | RESPIRATION RATE: 18 BRPM | HEART RATE: 73 BPM | HEIGHT: 62 IN | WEIGHT: 201.72 LBS | BODY MASS INDEX: 37.12 KG/M2

## 2024-10-18 DIAGNOSIS — Z30.09 STERILIZATION CONSULT: ICD-10-CM

## 2024-10-18 LAB
ABO GROUP BLD: NORMAL
BLD GP AB SCN SERPL QL: NEGATIVE
DEPRECATED RDW RBC AUTO: 42.9 FL (ref 37–54)
ERYTHROCYTE [DISTWIDTH] IN BLOOD BY AUTOMATED COUNT: 13.3 % (ref 12.3–15.4)
HCT VFR BLD AUTO: 41.6 % (ref 34–46.6)
HGB BLD-MCNC: 13.2 G/DL (ref 12–15.9)
MCH RBC QN AUTO: 27.6 PG (ref 26.6–33)
MCHC RBC AUTO-ENTMCNC: 31.7 G/DL (ref 31.5–35.7)
MCV RBC AUTO: 87 FL (ref 79–97)
PLATELET # BLD AUTO: 324 10*3/MM3 (ref 140–450)
PMV BLD AUTO: 11.3 FL (ref 6–12)
RBC # BLD AUTO: 4.78 10*6/MM3 (ref 3.77–5.28)
RH BLD: POSITIVE
T&S EXPIRATION DATE: NORMAL
WBC NRBC COR # BLD AUTO: 9.25 10*3/MM3 (ref 3.4–10.8)

## 2024-10-18 PROCEDURE — 36415 COLL VENOUS BLD VENIPUNCTURE: CPT

## 2024-10-18 PROCEDURE — 86850 RBC ANTIBODY SCREEN: CPT

## 2024-10-18 PROCEDURE — 86900 BLOOD TYPING SEROLOGIC ABO: CPT

## 2024-10-18 PROCEDURE — 85027 COMPLETE CBC AUTOMATED: CPT

## 2024-10-18 PROCEDURE — 86901 BLOOD TYPING SEROLOGIC RH(D): CPT

## 2024-10-18 RX ORDER — ACETAMINOPHEN 500 MG
500 TABLET ORAL EVERY 6 HOURS PRN
COMMUNITY

## 2024-10-18 NOTE — DISCHARGE INSTRUCTIONS
Preparing for Surgery  Follow these instructions before the procedure:  Several days or weeks before your procedure      Ask your health care provider about:  Changing or stopping your regular medicines. This is especially important if you are taking diabetes medicines or blood thinners.  Taking medicines such as aspirin and ibuprofen. These medicines can thin your blood. Do not take these medicines unless your health care provider tells you to take them.  Taking over-the-counter medicines, vitamins, herbs, and supplements.    Contact your surgeon if you:  Develop a fever of more than 100.4°F (38°C) or other feelings of illness during the 48 hours before your surgery.  Have symptoms that get worse.  Have questions or concerns about your surgery.  If you are going home the same day of your surgery you will need to arrange for a responsible adult, age 18 years old or older, to drive you home from the hospital and stay with you for 24 hours. Verification of the  will be made prior to any procedure requiring sedation. You may not go home in a taxi or any form of public transportation by yourself.     Day before your procedure    24 hours before your procedure DO NOT drink alcoholic beverages or smoke.  24 hours before your procedure STOP taking Erectile Dysfunction medication (i.e.,Cialis, Viagra)   You may be asked to shower with a germ-killing soap.  Day of your procedure   You may take the following medication(s) the morning of surgery with a sip of water:  MEDICATIONS AS INSTRUCTED PER PROVIDER       8 hours before your scheduled arrival time, STOP all food, any dairy products, and full liquids. This includes hard candy, chewing gum or mints. This is extremely important to prevent serious complications.     Up to 2 hours before your scheduled arrival time, you may have clear liquids no cream, powder, or pulp of any kind. Safe options are water, black coffee, plain tea, soda, Gatorade/Powerade, clear broth,  apple juice.    2 hours before your scheduled arrival time, STOP drinking clear liquids.    You may need to take another shower with a germ-killing soap before you leave home in the morning. Do not use perfumes, colognes, or body lotions.  Wear comfortable loose-fitting clothing.  Remove all jewelry including body piercing and rings, dark colored nail polish, and make up prior to arrival at the hospital. Leave all valuables at home.   Bring your hearing aids if you rely on them.  Do not wear contact lenses. If you wear eyeglasses remember to bring a case to store them in while you are in surgery.  Do not use denture adhesives since you will be asked to remove them during your surgery.    You do not need to bring your home medications into the hospital.   Bring your sleep apnea device with you on the day of your surgery (if this applies to you).  If you wear portable oxygen, bring it with you.   If you are staying overnight, you may bring a bag of items you may need such as slippers, robe and a change of clothes for your discharge. You may want to leave these items in the car until you are ready for them since your family will take your belongings when you leave the pre-operative area.  Arrive at the hospital as scheduled by the office. You will be asked to arrive 2 hours prior to your surgery time in order to prepare for your procedure.  When you arrive at the hospital  Go to the registration desk located at the main entrance of the hospital.  After registration is completed, you will be given a beeper and a sticker sheet. Take the stickers to Outpatient Surgery and place in the tray at the end of the desk to notify the staff that you have arrived and registered.   Return to the lobby to wait. You are not always called back according to the time of arrival but rather the time your doctor will be ready.  When your beeper lights up and vibrates proceed through the double doors, under the stairs, and a member of the  Outpatient Surgery staff will escort you to your preoperative room.                 How to Use Chlorhexidine Before Surgery  Chlorhexidine gluconate (CHG) is a germ-killing (antiseptic) solution that is used to clean the skin. It can get rid of the bacteria that normally live on the skin and can keep them away for about 24 hours. To clean your skin with CHG, you may be given:  A CHG solution to use in the shower or as part of a sponge bath.  A prepackaged cloth that contains CHG.  Cleaning your skin with CHG may help lower the risk for infection:  While you are staying in the intensive care unit of the hospital.  If you have a vascular access, such as a central line, to provide short-term or long-term access to your veins.  If you have a catheter to drain urine from your bladder.  If you are on a ventilator. A ventilator is a machine that helps you breathe by moving air in and out of your lungs.  After surgery.  What are the risks?  Risks of using CHG include:  A skin reaction.  Hearing loss, if CHG gets in your ears and you have a perforated eardrum.  Eye injury, if CHG gets in your eyes and is not rinsed out.  The CHG product catching fire.  Make sure that you avoid smoking and flames after applying CHG to your skin.  Do not use CHG:  If you have a chlorhexidine allergy or have previously reacted to chlorhexidine.  On babies younger than 2 months of age.  How to use CHG solution  Use CHG only as told by your health care provider, and follow the instructions on the label.  Use the full amount of CHG as directed. Usually, this is one bottle.  During a shower    Follow these steps when using CHG solution during a shower (unless your health care provider gives you different instructions):  Start the shower.  Use your normal soap and shampoo to wash your face and hair.  Turn off the shower or move out of the shower stream.  Pour the CHG onto a clean washcloth. Do not use any type of brush or rough-edged  sponge.  Starting at your neck, lather your body down to your toes. Make sure you follow these instructions:  If you will be having surgery, pay special attention to the part of your body where you will be having surgery. Scrub this area for at least 1 minute.  Do not use CHG on your head or face. If the solution gets into your ears or eyes, rinse them well with water.  Avoid your genital area.  Avoid any areas of skin that have broken skin, cuts, or scrapes.  Scrub your back and under your arms. Make sure to wash skin folds.  Let the lather sit on your skin for 1-2 minutes or as long as told by your health care provider.  Thoroughly rinse your entire body in the shower. Make sure that all body creases and crevices are rinsed well.  Dry off with a clean towel. Do not put any substances on your body afterward--such as powder, lotion, or perfume--unless you are told to do so by your health care provider. Only use lotions that are recommended by the .  Put on clean clothes or pajamas.  If it is the night before your surgery, sleep in clean sheets.     During a sponge bath  Follow these steps when using CHG solution during a sponge bath (unless your health care provider gives you different instructions):  Use your normal soap and shampoo to wash your face and hair.  Pour the CHG onto a clean washcloth.  Starting at your neck, lather your body down to your toes. Make sure you follow these instructions:  If you will be having surgery, pay special attention to the part of your body where you will be having surgery. Scrub this area for at least 1 minute.  Do not use CHG on your head or face. If the solution gets into your ears or eyes, rinse them well with water.  Avoid your genital area.  Avoid any areas of skin that have broken skin, cuts, or scrapes.  Scrub your back and under your arms. Make sure to wash skin folds.  Let the lather sit on your skin for 1-2 minutes or as long as told by your health care  provider.  Using a different clean, wet washcloth, thoroughly rinse your entire body. Make sure that all body creases and crevices are rinsed well.  Dry off with a clean towel. Do not put any substances on your body afterward--such as powder, lotion, or perfume--unless you are told to do so by your health care provider. Only use lotions that are recommended by the .  Put on clean clothes or pajamas.  If it is the night before your surgery, sleep in clean sheets.  How to use CHG prepackaged cloths  Only use CHG cloths as told by your health care provider, and follow the instructions on the label.  Use the CHG cloth on clean, dry skin.  Do not use the CHG cloth on your head or face unless your health care provider tells you to.  When washing with the CHG cloth:  Avoid your genital area.  Avoid any areas of skin that have broken skin, cuts, or scrapes.  Before surgery    Follow these steps when using a CHG cloth to clean before surgery (unless your health care provider gives you different instructions):  Using the CHG cloth, vigorously scrub the part of your body where you will be having surgery. Scrub using a back-and-forth motion for 3 minutes. The area on your body should be completely wet with CHG when you are done scrubbing.  Do not rinse. Discard the cloth and let the area air-dry. Do not put any substances on the area afterward, such as powder, lotion, or perfume.  Put on clean clothes or pajamas.  If it is the night before your surgery, sleep in clean sheets.     For general bathing  Follow these steps when using CHG cloths for general bathing (unless your health care provider gives you different instructions).  Use a separate CHG cloth for each area of your body. Make sure you wash between any folds of skin and between your fingers and toes. Wash your body in the following order, switching to a new cloth after each step:  The front of your neck, shoulders, and chest.  Both of your arms, under your  arms, and your hands.  Your stomach and groin area, avoiding the genitals.  Your right leg and foot.  Your left leg and foot.  The back of your neck, your back, and your buttocks.  Do not rinse. Discard the cloth and let the area air-dry. Do not put any substances on your body afterward--such as powder, lotion, or perfume--unless you are told to do so by your health care provider. Only use lotions that are recommended by the .  Put on clean clothes or pajamas.  Contact a health care provider if:  Your skin gets irritated after scrubbing.  You have questions about using your solution or cloth.  You swallow any chlorhexidine. Call your local poison control center (1-341.344.6658 in the [place]  Get help right away if:  Your eyes itch badly, or they become very red or swollen.  Your skin itches badly and is red or swollen.  Your hearing changes.  You have trouble seeing.  You have swelling or tingling in your mouth or throat.  You have trouble breathing.  These symptoms may represent a serious problem that is an emergency. Do not wait to see if the symptoms will go away. Get medical help right away. Call your local emergency services (910 in the [place] Do not drive yourself to the hospital.  Summary  Chlorhexidine gluconate (CHG) is a germ-killing (antiseptic) solution that is used to clean the skin. Cleaning your skin with CHG may help to lower your risk for infection.  You may be given CHG to use for bathing. It may be in a bottle or in a prepackaged cloth to use on your skin. Carefully follow your health care provider's instructions and the instructions on the product label.  Do not use CHG if you have a chlorhexidine allergy.  Contact your health care provider if your skin gets irritated after scrubbing.  This information is not intended to replace advice given to you by your health care provider. Make sure you discuss any questions you have with your health care provider.  Document Revised: 04/17/2023  Document Reviewed: 02/28/2022  Elsevier Patient Education © 2023 [employer]

## 2024-10-21 ENCOUNTER — HOSPITAL ENCOUNTER (OUTPATIENT)
Facility: HOSPITAL | Age: 24
Setting detail: HOSPITAL OUTPATIENT SURGERY
Discharge: HOME | End: 2024-10-21
Attending: OBSTETRICS & GYNECOLOGY | Admitting: OBSTETRICS & GYNECOLOGY
Payer: COMMERCIAL

## 2024-10-21 ENCOUNTER — ANESTHESIA EVENT (OUTPATIENT)
Dept: PERIOP | Facility: HOSPITAL | Age: 24
End: 2024-10-21
Payer: COMMERCIAL

## 2024-10-21 ENCOUNTER — ANESTHESIA (OUTPATIENT)
Dept: PERIOP | Facility: HOSPITAL | Age: 24
End: 2024-10-21
Payer: COMMERCIAL

## 2024-10-21 VITALS
OXYGEN SATURATION: 97 % | RESPIRATION RATE: 16 BRPM | HEART RATE: 54 BPM | SYSTOLIC BLOOD PRESSURE: 98 MMHG | TEMPERATURE: 97.3 F | DIASTOLIC BLOOD PRESSURE: 55 MMHG

## 2024-10-21 DIAGNOSIS — Z30.09 STERILIZATION CONSULT: ICD-10-CM

## 2024-10-21 LAB — B-HCG UR QL: NEGATIVE

## 2024-10-21 PROCEDURE — 58671 LAPAROSCOPY TUBAL BLOCK: CPT | Performed by: OBSTETRICS & GYNECOLOGY

## 2024-10-21 PROCEDURE — 25010000002 MIDAZOLAM PER 1MG: Performed by: ANESTHESIOLOGY

## 2024-10-21 PROCEDURE — 25010000002 FENTANYL CITRATE (PF) 100 MCG/2ML SOLUTION

## 2024-10-21 PROCEDURE — 25810000003 LACTATED RINGERS PER 1000 ML: Performed by: OBSTETRICS & GYNECOLOGY

## 2024-10-21 PROCEDURE — 81025 URINE PREGNANCY TEST: CPT | Performed by: OBSTETRICS & GYNECOLOGY

## 2024-10-21 PROCEDURE — 25010000002 ONDANSETRON PER 1 MG

## 2024-10-21 PROCEDURE — 25010000002 LIDOCAINE PF 2% 2 % SOLUTION

## 2024-10-21 PROCEDURE — 25010000002 DROPERIDOL PER 5 MG: Performed by: ANESTHESIOLOGY

## 2024-10-21 PROCEDURE — 25010000002 PROPOFOL 10 MG/ML EMULSION

## 2024-10-21 DEVICE — CLIP FALLOP FILSHIE TI PR STRL BX/10: Type: IMPLANTABLE DEVICE | Site: ABDOMEN | Status: FUNCTIONAL

## 2024-10-21 RX ORDER — IBUPROFEN 600 MG/1
600 TABLET, FILM COATED ORAL EVERY 6 HOURS PRN
Status: DISCONTINUED | OUTPATIENT
Start: 2024-10-21 | End: 2024-10-21 | Stop reason: HOSPADM

## 2024-10-21 RX ORDER — HYDROCODONE BITARTRATE AND ACETAMINOPHEN 5; 325 MG/1; MG/1
1 TABLET ORAL EVERY 4 HOURS PRN
Status: DISCONTINUED | OUTPATIENT
Start: 2024-10-21 | End: 2024-10-21 | Stop reason: HOSPADM

## 2024-10-21 RX ORDER — SODIUM CHLORIDE, SODIUM LACTATE, POTASSIUM CHLORIDE, CALCIUM CHLORIDE 600; 310; 30; 20 MG/100ML; MG/100ML; MG/100ML; MG/100ML
1000 INJECTION, SOLUTION INTRAVENOUS CONTINUOUS
Status: DISCONTINUED | OUTPATIENT
Start: 2024-10-21 | End: 2024-10-21 | Stop reason: HOSPADM

## 2024-10-21 RX ORDER — SODIUM CHLORIDE 0.9 % (FLUSH) 0.9 %
1-10 SYRINGE (ML) INJECTION AS NEEDED
Status: DISCONTINUED | OUTPATIENT
Start: 2024-10-21 | End: 2024-10-21 | Stop reason: HOSPADM

## 2024-10-21 RX ORDER — NEOSTIGMINE METHYLSULFATE 5 MG/5 ML
SYRINGE (ML) INTRAVENOUS AS NEEDED
Status: DISCONTINUED | OUTPATIENT
Start: 2024-10-21 | End: 2024-10-21 | Stop reason: SURG

## 2024-10-21 RX ORDER — FENTANYL CITRATE 50 UG/ML
50 INJECTION, SOLUTION INTRAMUSCULAR; INTRAVENOUS
Status: DISCONTINUED | OUTPATIENT
Start: 2024-10-21 | End: 2024-10-21 | Stop reason: HOSPADM

## 2024-10-21 RX ORDER — TRAMADOL HYDROCHLORIDE 50 MG/1
50 TABLET ORAL EVERY 8 HOURS PRN
Qty: 9 TABLET | Refills: 0 | Status: SHIPPED | OUTPATIENT
Start: 2024-10-21 | End: 2024-10-24

## 2024-10-21 RX ORDER — ONDANSETRON 2 MG/ML
INJECTION INTRAMUSCULAR; INTRAVENOUS AS NEEDED
Status: DISCONTINUED | OUTPATIENT
Start: 2024-10-21 | End: 2024-10-21 | Stop reason: SURG

## 2024-10-21 RX ORDER — MAGNESIUM HYDROXIDE 1200 MG/15ML
LIQUID ORAL AS NEEDED
Status: DISCONTINUED | OUTPATIENT
Start: 2024-10-21 | End: 2024-10-21 | Stop reason: HOSPADM

## 2024-10-21 RX ORDER — SODIUM CHLORIDE 9 MG/ML
40 INJECTION, SOLUTION INTRAVENOUS AS NEEDED
Status: DISCONTINUED | OUTPATIENT
Start: 2024-10-21 | End: 2024-10-21 | Stop reason: HOSPADM

## 2024-10-21 RX ORDER — MIDAZOLAM HYDROCHLORIDE 2 MG/2ML
1 INJECTION, SOLUTION INTRAMUSCULAR; INTRAVENOUS
Status: DISCONTINUED | OUTPATIENT
Start: 2024-10-21 | End: 2024-10-21 | Stop reason: HOSPADM

## 2024-10-21 RX ORDER — BUPIVACAINE HYDROCHLORIDE AND EPINEPHRINE 5; 5 MG/ML; UG/ML
INJECTION, SOLUTION PERINEURAL AS NEEDED
Status: DISCONTINUED | OUTPATIENT
Start: 2024-10-21 | End: 2024-10-21 | Stop reason: HOSPADM

## 2024-10-21 RX ORDER — FENTANYL CITRATE 50 UG/ML
INJECTION, SOLUTION INTRAMUSCULAR; INTRAVENOUS AS NEEDED
Status: DISCONTINUED | OUTPATIENT
Start: 2024-10-21 | End: 2024-10-21 | Stop reason: SURG

## 2024-10-21 RX ORDER — FLUMAZENIL 0.1 MG/ML
0.2 INJECTION INTRAVENOUS AS NEEDED
Status: DISCONTINUED | OUTPATIENT
Start: 2024-10-21 | End: 2024-10-21 | Stop reason: HOSPADM

## 2024-10-21 RX ORDER — ROCURONIUM BROMIDE 10 MG/ML
INJECTION, SOLUTION INTRAVENOUS AS NEEDED
Status: DISCONTINUED | OUTPATIENT
Start: 2024-10-21 | End: 2024-10-21 | Stop reason: SURG

## 2024-10-21 RX ORDER — DROPERIDOL 2.5 MG/ML
0.62 INJECTION, SOLUTION INTRAMUSCULAR; INTRAVENOUS ONCE AS NEEDED
Status: COMPLETED | OUTPATIENT
Start: 2024-10-21 | End: 2024-10-21

## 2024-10-21 RX ORDER — NALOXONE HCL 0.4 MG/ML
0.4 VIAL (ML) INJECTION AS NEEDED
Status: DISCONTINUED | OUTPATIENT
Start: 2024-10-21 | End: 2024-10-21 | Stop reason: HOSPADM

## 2024-10-21 RX ORDER — ONDANSETRON 4 MG/1
4 TABLET, ORALLY DISINTEGRATING ORAL EVERY 8 HOURS PRN
Qty: 21 TABLET | Refills: 0 | Status: SHIPPED | OUTPATIENT
Start: 2024-10-21

## 2024-10-21 RX ORDER — SODIUM CHLORIDE, SODIUM LACTATE, POTASSIUM CHLORIDE, CALCIUM CHLORIDE 600; 310; 30; 20 MG/100ML; MG/100ML; MG/100ML; MG/100ML
100 INJECTION, SOLUTION INTRAVENOUS CONTINUOUS
Status: DISCONTINUED | OUTPATIENT
Start: 2024-10-21 | End: 2024-10-21 | Stop reason: HOSPADM

## 2024-10-21 RX ORDER — PROPOFOL 10 MG/ML
VIAL (ML) INTRAVENOUS AS NEEDED
Status: DISCONTINUED | OUTPATIENT
Start: 2024-10-21 | End: 2024-10-21 | Stop reason: SURG

## 2024-10-21 RX ORDER — LABETALOL HYDROCHLORIDE 5 MG/ML
5 INJECTION, SOLUTION INTRAVENOUS
Status: DISCONTINUED | OUTPATIENT
Start: 2024-10-21 | End: 2024-10-21 | Stop reason: HOSPADM

## 2024-10-21 RX ORDER — SODIUM CHLORIDE 0.9 % (FLUSH) 0.9 %
3 SYRINGE (ML) INJECTION EVERY 12 HOURS SCHEDULED
Status: DISCONTINUED | OUTPATIENT
Start: 2024-10-21 | End: 2024-10-21 | Stop reason: HOSPADM

## 2024-10-21 RX ORDER — SODIUM CHLORIDE 0.9 % (FLUSH) 0.9 %
3-10 SYRINGE (ML) INJECTION AS NEEDED
Status: DISCONTINUED | OUTPATIENT
Start: 2024-10-21 | End: 2024-10-21 | Stop reason: HOSPADM

## 2024-10-21 RX ORDER — LIDOCAINE HYDROCHLORIDE 20 MG/ML
INJECTION, SOLUTION EPIDURAL; INFILTRATION; INTRACAUDAL; PERINEURAL AS NEEDED
Status: DISCONTINUED | OUTPATIENT
Start: 2024-10-21 | End: 2024-10-21 | Stop reason: SURG

## 2024-10-21 RX ORDER — BUPIVACAINE HCL/0.9 % NACL/PF 0.125 %
PLASTIC BAG, INJECTION (ML) EPIDURAL AS NEEDED
Status: DISCONTINUED | OUTPATIENT
Start: 2024-10-21 | End: 2024-10-21 | Stop reason: SURG

## 2024-10-21 RX ORDER — ACETAMINOPHEN 500 MG
1000 TABLET ORAL ONCE
Status: COMPLETED | OUTPATIENT
Start: 2024-10-21 | End: 2024-10-21

## 2024-10-21 RX ORDER — SODIUM CHLORIDE 0.9 % (FLUSH) 0.9 %
10 SYRINGE (ML) INJECTION EVERY 12 HOURS SCHEDULED
Status: DISCONTINUED | OUTPATIENT
Start: 2024-10-21 | End: 2024-10-21 | Stop reason: HOSPADM

## 2024-10-21 RX ORDER — HYDROCODONE BITARTRATE AND ACETAMINOPHEN 10; 325 MG/1; MG/1
1 TABLET ORAL EVERY 4 HOURS PRN
Status: DISCONTINUED | OUTPATIENT
Start: 2024-10-21 | End: 2024-10-21 | Stop reason: HOSPADM

## 2024-10-21 RX ORDER — IBUPROFEN 600 MG/1
600 TABLET, FILM COATED ORAL EVERY 6 HOURS PRN
Qty: 40 TABLET | Refills: 1 | Status: SHIPPED | OUTPATIENT
Start: 2024-10-21

## 2024-10-21 RX ORDER — ONDANSETRON 2 MG/ML
4 INJECTION INTRAMUSCULAR; INTRAVENOUS ONCE AS NEEDED
Status: DISCONTINUED | OUTPATIENT
Start: 2024-10-21 | End: 2024-10-21 | Stop reason: HOSPADM

## 2024-10-21 RX ORDER — SODIUM CHLORIDE 9 MG/ML
100 INJECTION, SOLUTION INTRAVENOUS CONTINUOUS
Status: DISCONTINUED | OUTPATIENT
Start: 2024-10-21 | End: 2024-10-21 | Stop reason: HOSPADM

## 2024-10-21 RX ADMIN — FENTANYL CITRATE 100 MCG: 50 INJECTION, SOLUTION INTRAMUSCULAR; INTRAVENOUS at 14:52

## 2024-10-21 RX ADMIN — ROCURONIUM 30 MG: 50 INJECTION, SOLUTION INTRAVENOUS at 14:52

## 2024-10-21 RX ADMIN — PROPOFOL 150 MG: 10 INJECTION, EMULSION INTRAVENOUS at 14:52

## 2024-10-21 RX ADMIN — GLYCOPYRROLATE 0.4 MG: 0.2 INJECTION INTRAMUSCULAR; INTRAVENOUS at 15:10

## 2024-10-21 RX ADMIN — HYDROCODONE BITARTRATE AND ACETAMINOPHEN 1 TABLET: 10; 325 TABLET ORAL at 15:31

## 2024-10-21 RX ADMIN — DROPERIDOL 0.62 MG: 2.5 INJECTION, SOLUTION INTRAMUSCULAR; INTRAVENOUS at 15:23

## 2024-10-21 RX ADMIN — SODIUM CHLORIDE, POTASSIUM CHLORIDE, SODIUM LACTATE AND CALCIUM CHLORIDE 1000 ML: 600; 310; 30; 20 INJECTION, SOLUTION INTRAVENOUS at 13:00

## 2024-10-21 RX ADMIN — Medication 3 MG: at 15:10

## 2024-10-21 RX ADMIN — ONDANSETRON 4 MG: 2 INJECTION INTRAMUSCULAR; INTRAVENOUS at 14:59

## 2024-10-21 RX ADMIN — LIDOCAINE HYDROCHLORIDE 100 MG: 20 INJECTION, SOLUTION EPIDURAL; INFILTRATION; INTRACAUDAL; PERINEURAL at 14:52

## 2024-10-21 RX ADMIN — ACETAMINOPHEN 1000 MG: 500 TABLET, FILM COATED ORAL at 13:58

## 2024-10-21 RX ADMIN — MIDAZOLAM HYDROCHLORIDE 1 MG: 1 INJECTION, SOLUTION INTRAMUSCULAR; INTRAVENOUS at 13:58

## 2024-10-21 RX ADMIN — IBUPROFEN 600 MG: 600 TABLET, FILM COATED ORAL at 15:40

## 2024-10-21 RX ADMIN — Medication 100 MCG: at 15:03

## 2024-10-21 NOTE — ANESTHESIA PROCEDURE NOTES
Airway  Urgency: elective    Date/Time: 10/21/2024 2:52 PM  Airway not difficult    General Information and Staff    Patient location during procedure: OR    Indications and Patient Condition  Indications for airway management: airway protection    Preoxygenated: yes  Mask difficulty assessment: 1 - vent by mask    Final Airway Details  Final airway type: endotracheal airway      Successful airway: ETT  Cuffed: yes   Successful intubation technique: direct laryngoscopy  Endotracheal tube insertion site: oral  Blade: Mccoy  Blade size: 2  ETT size (mm): 7.0  Cormack-Lehane Classification: grade I - full view of glottis  Placement verified by: chest auscultation and capnometry   Measured from: teeth  ETT/EBT  to teeth (cm): 21  Number of attempts at approach: 1  Assessment: lips, teeth, and gum same as pre-op and atraumatic intubation

## 2024-10-21 NOTE — INTERVAL H&P NOTE
H&P reviewed. The patient was examined and there are no changes to the H&P. Plan for laparoscopic bilateral tubal ligation with Filshie clips

## 2024-10-21 NOTE — ANESTHESIA PREPROCEDURE EVALUATION
Anesthesia Evaluation     no history of anesthetic complications:   NPO Solid Status: > 8 hours  NPO Liquid Status: > 8 hours           Airway   Mallampati: II  No difficulty expected  Dental      Pulmonary    (+) a smoker Current, asthma,  Cardiovascular   Exercise tolerance: good (4-7 METS)    (-) hypertension, CAD      Neuro/Psych  (+) psychiatric history Anxiety and Depression  (-) seizures, TIA, CVA  GI/Hepatic/Renal/Endo    (+) obesity  (-) liver disease, no renal disease, diabetes    Musculoskeletal     Abdominal    Substance History      OB/GYN          Other                    Anesthesia Plan    ASA 2     general     intravenous induction     Anesthetic plan, risks, benefits, and alternatives have been provided, discussed and informed consent has been obtained with: patient.    CODE STATUS:

## 2024-10-21 NOTE — ANESTHESIA POSTPROCEDURE EVALUATION
Patient: Breann Baltazar    Procedure Summary       Date: 10/21/24 Room / Location: [facility] OR  /  [facility] OR    Anesthesia Start: 1449 Anesthesia Stop: 1520    Procedure: TUBAL FALLOPE FILSHIE CLIPPING LAPAROSCOPIC (Abdomen) Diagnosis:       Sterilization consult      (Sterilization consult [Z30.09])    Surgeons: José Miguel Downing MD Provider: Ken Angeles CRNA    Anesthesia Type: general ASA Status: 2            Anesthesia Type: general    Vitals  Vitals Value Taken Time   /66 10/21/24 1552   Temp 97.3 °F (36.3 °C) 10/21/24 1549   Pulse 46 10/21/24 1559   Resp 14 10/21/24 1549   SpO2 96 % 10/21/24 1558   Vitals shown include unfiled device data.        Post Anesthesia Care and Evaluation    Patient location during evaluation: PACU  Patient participation: complete - patient participated  Level of consciousness: awake and alert  Pain management: adequate    Airway patency: patent  Anesthetic complications: No anesthetic complications  PONV Status: none  Cardiovascular status: acceptable and hemodynamically stable  Respiratory status: acceptable  Hydration status: acceptable    Comments: Blood pressure 98/55, pulse 54, temperature 97.3 °F (36.3 °C), resp. rate 16, SpO2 97%, not currently breastfeeding.    Patient discharged from PACU based upon Homero score. Please see RN notes for further details

## 2024-10-21 NOTE — OP NOTE
[place]  Breann Baltazar  : 2000  MRN: [id number]  CSN: [id number]  Date: 2022    Operative Note      Pre-op Diagnosis:  Sterilization consult [Z30.09]   Post-op Diagnosis:  S/p laparoscopic tubal occlusion with filshie     Procedure: Procedure(s):  TUBAL FALLOPE FILSHIE CLIPPING LAPAROSCOPIC     Surgeon: Surgeon(s):  José Miguel Downing MD       Anesthesia: General     Estimated Blood Loss: 5   mls   Fluids: 500   mls   UOP: 100   mls   Drains: none   ABx: Not indicated     Specimens:  none   Device: LOT# [id number]   Findings: Bimanual exam revealed mobile, normal uterus without palpable adnexal masses. Ovaries palpated normal. Normal appearing uterus and bilateral ovarian and fallopian tubes.   Complications: none     INDICATION: Breann Baltazar is a 24 y.o. female with undesired fertility.  She is aware of reversible alternatives and desires surgical sterilization.    PROCEDURE IN DETAIL:  The patient was taken to the operating room where a timeout was performed to confirm the correct patient and the correct procedure.  Preoperative prophylactic intravenous antibiotics are not indicated and were not given to the patient.  The patient was then placed in the dorsal supine position and care was taken to pad all pressure points.  General anesthesia was established.  Warm blankets was placed to maintain control of core body temperature.  The patient was placed in the dorsal lithotomy position using Mansoor stirrups. A bimanual exam was performed, findings as noted above. She was prepped and draped in the usual sterile fashion. Bladder was drained with a straight catheter.     Attention was then turned to the abdomen. 0.5% marcaine with epinephrine was injected at planned incision sites. A 5 mm vertical incision was made infraumbilically. Under direct visualization using the optical trocar, direct entry with the trocar and camera were inserted uneventfully into the peritoneal cavity.  The peritoneal  cavity was then insufflated with CO2 gas, opening pressure was < 8 mmHg. Peritoneum insufflated to a maximum pressure of 15 mmHg. Examination of the peritoneal cavity revealed no signs of injury from entry. Findings on inspection of the abdominal cavity as noted above.     Attention was then turned to the pelvis. The patient was placed into Trendelenburg position. Pelvic findings were appreciated as noted above. An additional small incisions were made in the left lower quadrant in a similar fashion as initial trocar insertion. An 8 mm trocar was introduced under direct visualization.     The fallopian tubes were inspected bilaterally and the fimbriated ends of the fallopian tubes were visualized bilaterally. The right fallopian tube and mesosalpinx was then grasped and clamped using a Filshie clip. This was done about 2 cm from the cornua. Adequate occlusion of the entire fallopian tube was confirmed. Attention was then turned to the contralateral fallopian tube and mesosalpinx. The left fallopian tube and mesosalpinx was then grasped and clamped using a Filshie clip. This was done about 2 cm from the cornua. Adequate occlusion of the entire fallopian tube was confirmed.     Pneumoperitoneum was then evacuated. The laparoscope was removed and the trocar sleeves were removed. The skin incisions were closed using 4-O monocryl and then covered with skin glue. Good hemostasis was confirmed.     All needle, sponge, and instrument counts were noted to be correct x 3 at the end of the procedure. The patient tolerated the procedure well and was taken to the recovery room in stable condition.      Patient's spouse/family updated following surgery. Surgery, recovery, and post-operative expectations discussed. They were encouraged to call with any questions postoperatively. Their questions were answered. They expressed understanding.       José Miguel Downing MD   10/21/2024  15:17 CDT

## 2024-11-12 ENCOUNTER — OFFICE VISIT (OUTPATIENT)
Dept: OBSTETRICS AND GYNECOLOGY | Age: 24
End: 2024-11-12
Payer: COMMERCIAL

## 2024-11-12 VITALS
BODY MASS INDEX: 37.73 KG/M2 | HEIGHT: 62 IN | WEIGHT: 205 LBS | DIASTOLIC BLOOD PRESSURE: 74 MMHG | SYSTOLIC BLOOD PRESSURE: 116 MMHG

## 2024-11-12 DIAGNOSIS — Z09 POSTOPERATIVE FOLLOW-UP: Primary | ICD-10-CM

## 2024-11-12 PROCEDURE — 99024 POSTOP FOLLOW-UP VISIT: CPT | Performed by: OBSTETRICS & GYNECOLOGY

## 2024-11-12 NOTE — PROGRESS NOTES
"    José Miguel Downing MD  [facility]  [address]  [place] [address]  Office [phone]  Fax [phone]      Subjective   Subjective     Breann Baltazar is a 24 y.o. female who presents to the clinic 3 weeks status post laparoscopic tubal with Filshie.     Eating a regular diet without difficulty.   Bowel movements are normal.   Voiding without difficulty.  The patient is not having any pain.  Vaginal bleeding: none      Review of Systems  ROS notable for: meeting postoperative milestones    The following portions of the patient's history were reviewed and updated as appropriate: allergies, current medications, past family history, past medical history, past social history, past surgical history, and problem list.          Objective   Objective   Visit Vitals  /74   Ht 156.5 cm (61.61\")   Wt 93 kg (205 lb)   BMI 37.97 kg/m²     Physical Exam  Vitals and nursing note reviewed.   Constitutional:       Appearance: Normal appearance.   HENT:      Head: Normocephalic and atraumatic.   Eyes:      General: No scleral icterus.     Conjunctiva/sclera: Conjunctivae normal.   Abdominal:      General: There is no distension.      Palpations: Abdomen is soft.      Tenderness: There is no abdominal tenderness. There is no guarding or rebound.      Comments: Incisions: clean and dry, appears to be healing well, no erythema/bleeding/discharge from incisions   Neurological:      Mental Status: She is alert.                   Assessment & Plan   Assessment / Plan   Doing well postoperatively.  Operative findings again reviewed.   Pathology report discussed.  Incision care reviewed.  Activity restrictions reviewed: none  Anticipated return to work: now.    Diagnoses and all orders for this visit:    1. Postoperative follow-up (Primary)         Return in about 4 weeks (around 12/10/2024) for annual.         José Miguel Downing MD  11/12/2024  09:18 CST    "

## (undated) DEVICE — GLV SURG SENSICARE W/ALOE PF LF 7.5 STRL

## (undated) DEVICE — ENDOPATH XCEL WITH OPTIVIEW TECHNOLOGY UNIVERSAL TROCAR STABILITY SLEEVES: Brand: ENDOPATH XCEL OPTIVIEW

## (undated) DEVICE — UTILITY MARKER W/MED LABELS: Brand: MEDLINE

## (undated) DEVICE — STRAP SFTY OR/TABL STRTCHR 60X3IN WHT LF

## (undated) DEVICE — GAUZE,SPONGE,2"X2",8PLY,STERILE,LF,2'S: Brand: MEDLINE

## (undated) DEVICE — PK LAP GYN 30

## (undated) DEVICE — ST TBG PNEUMOCLEAR EVAC SMOKE HIFLO

## (undated) DEVICE — DRSNG SURESITE WNDW 2.38X2.75